# Patient Record
Sex: FEMALE | Race: WHITE | NOT HISPANIC OR LATINO | Employment: FULL TIME | URBAN - METROPOLITAN AREA
[De-identification: names, ages, dates, MRNs, and addresses within clinical notes are randomized per-mention and may not be internally consistent; named-entity substitution may affect disease eponyms.]

---

## 2017-01-20 ENCOUNTER — ALLSCRIPTS OFFICE VISIT (OUTPATIENT)
Dept: OTHER | Facility: OTHER | Age: 42
End: 2017-01-20

## 2017-04-19 ENCOUNTER — ALLSCRIPTS OFFICE VISIT (OUTPATIENT)
Dept: OTHER | Facility: OTHER | Age: 42
End: 2017-04-19

## 2017-04-19 DIAGNOSIS — Z13.220 ENCOUNTER FOR SCREENING FOR LIPOID DISORDERS: ICD-10-CM

## 2017-04-19 DIAGNOSIS — Z12.31 ENCOUNTER FOR SCREENING MAMMOGRAM FOR MALIGNANT NEOPLASM OF BREAST: ICD-10-CM

## 2017-04-19 DIAGNOSIS — R53.83 OTHER FATIGUE: ICD-10-CM

## 2017-04-19 DIAGNOSIS — Z13.6 ENCOUNTER FOR SCREENING FOR CARDIOVASCULAR DISORDERS: ICD-10-CM

## 2017-04-19 DIAGNOSIS — Z13.1 ENCOUNTER FOR SCREENING FOR DIABETES MELLITUS: ICD-10-CM

## 2017-05-17 ENCOUNTER — ALLSCRIPTS OFFICE VISIT (OUTPATIENT)
Dept: OTHER | Facility: OTHER | Age: 42
End: 2017-05-17

## 2017-05-23 ENCOUNTER — HOSPITAL ENCOUNTER (OUTPATIENT)
Dept: RADIOLOGY | Facility: HOSPITAL | Age: 42
Discharge: HOME | End: 2017-05-23
Payer: COMMERCIAL

## 2017-05-23 DIAGNOSIS — Z12.31 ENCOUNTER FOR SCREENING MAMMOGRAM FOR MALIGNANT NEOPLASM OF BREAST: ICD-10-CM

## 2017-05-23 PROCEDURE — G0202 SCR MAMMO BI INCL CAD: HCPCS

## 2017-05-30 ENCOUNTER — GENERIC CONVERSION - ENCOUNTER (OUTPATIENT)
Dept: OTHER | Facility: OTHER | Age: 42
End: 2017-05-30

## 2017-05-30 DIAGNOSIS — R92.8 OTHER ABNORMAL AND INCONCLUSIVE FINDINGS ON DIAGNOSTIC IMAGING OF BREAST: ICD-10-CM

## 2017-06-02 ENCOUNTER — HOSPITAL ENCOUNTER (OUTPATIENT)
Dept: RADIOLOGY | Facility: HOSPITAL | Age: 42
Discharge: HOME | End: 2017-06-02
Payer: COMMERCIAL

## 2017-06-02 DIAGNOSIS — R92.8 OTHER ABNORMAL AND INCONCLUSIVE FINDINGS ON DIAGNOSTIC IMAGING OF BREAST: ICD-10-CM

## 2017-06-02 PROCEDURE — G0206 DX MAMMO INCL CAD UNI: HCPCS

## 2017-06-02 PROCEDURE — 76642 ULTRASOUND BREAST LIMITED: CPT

## 2017-06-09 ENCOUNTER — GENERIC CONVERSION - ENCOUNTER (OUTPATIENT)
Dept: OTHER | Facility: OTHER | Age: 42
End: 2017-06-09

## 2017-07-17 ENCOUNTER — APPOINTMENT (OUTPATIENT)
Dept: LAB | Facility: HOSPITAL | Age: 42
End: 2017-07-17
Payer: COMMERCIAL

## 2017-07-17 ENCOUNTER — TRANSCRIBE ORDERS (OUTPATIENT)
Dept: ADMINISTRATIVE | Facility: HOSPITAL | Age: 42
End: 2017-07-17

## 2017-07-17 DIAGNOSIS — Z13.1 SCREENING FOR DIABETES MELLITUS: ICD-10-CM

## 2017-07-17 DIAGNOSIS — Z13.220 SCREENING FOR LIPOID DISORDERS: ICD-10-CM

## 2017-07-17 DIAGNOSIS — Z00.8 HEALTH EXAMINATION IN POPULATION SURVEY: Primary | ICD-10-CM

## 2017-07-17 DIAGNOSIS — R53.83 FATIGUE, UNSPECIFIED TYPE: Primary | ICD-10-CM

## 2017-07-17 DIAGNOSIS — Z00.8 HEALTH EXAMINATION IN POPULATION SURVEY: ICD-10-CM

## 2017-07-17 DIAGNOSIS — R53.83 FATIGUE, UNSPECIFIED TYPE: ICD-10-CM

## 2017-07-17 DIAGNOSIS — Z13.6 SCREENING FOR ISCHEMIC HEART DISEASE: ICD-10-CM

## 2017-07-17 LAB
ALBUMIN SERPL BCP-MCNC: 3.7 G/DL (ref 3.5–5)
ALP SERPL-CCNC: 84 U/L (ref 46–116)
ALT SERPL W P-5'-P-CCNC: 24 U/L (ref 12–78)
ANION GAP SERPL CALCULATED.3IONS-SCNC: 10 MMOL/L (ref 4–13)
AST SERPL W P-5'-P-CCNC: 15 U/L (ref 5–45)
BASOPHILS # BLD AUTO: 0 THOUSANDS/ΜL (ref 0–0.1)
BASOPHILS NFR BLD AUTO: 0 % (ref 0–1)
BILIRUB SERPL-MCNC: 0.3 MG/DL (ref 0.2–1)
BUN SERPL-MCNC: 14 MG/DL (ref 5–25)
CALCIUM SERPL-MCNC: 9 MG/DL (ref 8.3–10.1)
CHLORIDE SERPL-SCNC: 105 MMOL/L (ref 100–108)
CHOLEST SERPL-MCNC: 234 MG/DL (ref 50–200)
CO2 SERPL-SCNC: 24 MMOL/L (ref 21–32)
CREAT SERPL-MCNC: 0.89 MG/DL (ref 0.6–1.3)
EOSINOPHIL # BLD AUTO: 0.2 THOUSAND/ΜL (ref 0–0.61)
EOSINOPHIL NFR BLD AUTO: 2 % (ref 0–6)
ERYTHROCYTE [DISTWIDTH] IN BLOOD BY AUTOMATED COUNT: 13.3 % (ref 11.6–15.1)
EST. AVERAGE GLUCOSE BLD GHB EST-MCNC: 114 MG/DL
GFR SERPL CREATININE-BSD FRML MDRD: >60 ML/MIN/1.73SQ M
GLUCOSE P FAST SERPL-MCNC: 104 MG/DL (ref 65–99)
HBA1C MFR BLD: 5.6 % (ref 4.2–6.3)
HCT VFR BLD AUTO: 41.8 % (ref 37–47)
HDLC SERPL-MCNC: 53 MG/DL (ref 40–60)
HGB BLD-MCNC: 14 G/DL (ref 12–16)
LDLC SERPL CALC-MCNC: 150 MG/DL (ref 0–100)
LYMPHOCYTES # BLD AUTO: 2.9 THOUSANDS/ΜL (ref 0.6–4.47)
LYMPHOCYTES NFR BLD AUTO: 28 % (ref 14–44)
MCH RBC QN AUTO: 31 PG (ref 27–31)
MCHC RBC AUTO-ENTMCNC: 33.6 G/DL (ref 31.4–37.4)
MCV RBC AUTO: 92 FL (ref 82–98)
MONOCYTES # BLD AUTO: 0.7 THOUSAND/ΜL (ref 0.17–1.22)
MONOCYTES NFR BLD AUTO: 6 % (ref 4–12)
NEUTROPHILS # BLD AUTO: 6.8 THOUSANDS/ΜL (ref 1.85–7.62)
NEUTS SEG NFR BLD AUTO: 64 % (ref 43–75)
NRBC BLD AUTO-RTO: 0 /100 WBCS
PLATELET # BLD AUTO: 341 THOUSANDS/UL (ref 130–400)
PMV BLD AUTO: 8.8 FL (ref 8.9–12.7)
POTASSIUM SERPL-SCNC: 4.2 MMOL/L (ref 3.5–5.3)
PROT SERPL-MCNC: 7.1 G/DL (ref 6.4–8.2)
RBC # BLD AUTO: 4.53 MILLION/UL (ref 4.2–5.4)
SODIUM SERPL-SCNC: 139 MMOL/L (ref 136–145)
TRIGL SERPL-MCNC: 156 MG/DL
WBC # BLD AUTO: 10.6 THOUSAND/UL (ref 4.8–10.8)

## 2017-07-17 PROCEDURE — 85025 COMPLETE CBC W/AUTO DIFF WBC: CPT

## 2017-07-17 PROCEDURE — 36415 COLL VENOUS BLD VENIPUNCTURE: CPT

## 2017-07-17 PROCEDURE — 80061 LIPID PANEL: CPT

## 2017-07-17 PROCEDURE — 83036 HEMOGLOBIN GLYCOSYLATED A1C: CPT

## 2017-07-17 PROCEDURE — 80053 COMPREHEN METABOLIC PANEL: CPT

## 2017-07-18 ENCOUNTER — GENERIC CONVERSION - ENCOUNTER (OUTPATIENT)
Dept: OTHER | Facility: OTHER | Age: 42
End: 2017-07-18

## 2017-08-25 ENCOUNTER — ALLSCRIPTS OFFICE VISIT (OUTPATIENT)
Dept: OTHER | Facility: OTHER | Age: 42
End: 2017-08-25

## 2017-12-04 DIAGNOSIS — R92.8 OTHER ABNORMAL AND INCONCLUSIVE FINDINGS ON DIAGNOSTIC IMAGING OF BREAST: ICD-10-CM

## 2018-01-11 NOTE — RESULT NOTES
Discussion/Summary   Michelle,   Your fasting sugar is a little elevated, please watch your sugar and carbohydrate intake  Kidney function, liver function and blood count are normal    Dr Katerine Forbes      Verified Results  (1) CBC/PLT/DIFF [id number] 07:15AM Virginia Morejon     Test Name Result Flag Reference   WBC COUNT 10 60 Thousand/uL  4 80-10 80   RBC COUNT 4 53 Million/uL  4 20-5 40   HEMOGLOBIN 14 0 g/dL  12 0-16 0   HEMATOCRIT 41 8 %  37 0-47 0   MCV 92 fL  82-98   MCH 31 0 pg  27 0-31 0   MCHC 33 6 g/dL  31 4-37 4   RDW 13 3 %  11 6-15 1   MPV 8 8 fL L 8 9-12 7   PLATELET COUNT 739 Thousands/uL  130-400   nRBC AUTOMATED 0 /100 WBCs     NEUTROPHILS RELATIVE PERCENT 64 %  43-75   LYMPHOCYTES RELATIVE PERCENT 28 %  14-44   MONOCYTES RELATIVE PERCENT 6 %  4-12   EOSINOPHILS RELATIVE PERCENT 2 %  0-6   BASOPHILS RELATIVE PERCENT 0 %  0-1   NEUTROPHILS ABSOLUTE COUNT 6 80 Thousands/? ??L  1 85-7 62   LYMPHOCYTES ABSOLUTE COUNT 2 90 Thousands/? ??L  0 60-4 47   MONOCYTES ABSOLUTE COUNT 0 70 Thousand/? ??L  0 17-1 22   EOSINOPHILS ABSOLUTE COUNT 0 20 Thousand/? ??L  0 00-0 61   BASOPHILS ABSOLUTE COUNT 0 00 Thousands/? ??L  0 00-0 10   This bloodwork is non-fasting  Please drink two glasses of water morning of  bloodwork       (1) COMPREHENSIVE METABOLIC PANEL [id number] 29:41YZ Virginia Morejon     Test Name Result Flag Reference   SODIUM 139 mmol/L  136-145   POTASSIUM 4 2 mmol/L  3 5-5 3   CHLORIDE 105 mmol/L  100-108   CARBON DIOXIDE 24 mmol/L  21-32   ANION GAP (CALC) 10 mmol/L  4-13   BLOOD UREA NITROGEN 14 mg/dL  5-25   CREATININE 0 89 mg/dL  0 60-1 30   Standardized to IDMS reference method   CALCIUM 9 0 mg/dL  8 3-10 1   BILI, TOTAL 0 30 mg/dL  0 20-1 00   ALK PHOSPHATAS 84 U/L     ALT (SGPT) 24 U/L  12-78   AST(SGOT) 15 U/L  5-45   ALBUMIN 3 7 g/dL  3 5-5 0   TOTAL PROTEIN 7 1 g/dL  6 4-8 2   eGFR Non-African American      >60 0 ml/min/1 73sq    [employer] Education Program recommendations are as follows:  GFR calculation is accurate only with a steady state creatinine  Chronic Kidney disease less than 60 ml/min/1 73 sq  meters  Kidney failure less than 15 ml/min/1 73 sq  meters     GLUCOSE FASTING 104 mg/dL H 65-99

## 2018-01-11 NOTE — PROGRESS NOTES
Assessment    1  BMI 40 0-44 9, adult (V85 41) (Z68 41)   2  Encounter for preventive health examination (V70 0) (Z00 00)   3  Never smoker   4  Visit for screening mammogram (V76 12) (Z12 31)   5  Fatigue (780 79) (R53 83)    Plan  Anxiety    · Escitalopram Oxalate 20 MG Oral Tablet; Take 1 tablet daily  BMI 40 0-44 9, adult    · Begin a limited exercise program ; Status:Complete;   Done: 96KVO6851  Fatigue, Screening for cardiovascular condition, Screening for diabetes mellitus,  Screening for hyperlipidemia    · (1) CBC/PLT/DIFF; Status:Active; Requested for:19Apr2017;    · (1) COMPREHENSIVE METABOLIC PANEL; Status:Active; Requested for:19Apr2017;    · (1) HEMOGLOBIN A1C; Status:Active; Requested for:19Apr2017;    · (1) LIPID PANEL FASTING W DIRECT LDL REFLEX; Status:Active; Requested  for:19Apr2017;   Visit for screening mammogram    · * MAMMO SCREENING BILATERAL W CAD; Status:Active; Requested for:19Apr2017;     Discussion/Summary  health maintenance visit Currently, she eats an adequate diet and has an adequate exercise regimen  cervical cancer screening is current Breast cancer screening: mammogram has been ordered  Colorectal cancer screening: colorectal cancer screening is not indicated  Osteoporosis screening: bone mineral density testing is not indicated  The immunizations are up to date  Advice and education were given regarding weight loss  Chief Complaint  pt present for CPE  ac/cma      History of Present Illness  HM, Adult Female: The patient is being seen for a health maintenance evaluation  General Health: The patient's health since the last visit is described as good  Lifestyle:  She does not have a healthy diet  She has weight concerns  She exercises regularly  She does not use tobacco  she has just bought a bike  Screening:   HPI: her lexapro is working well  she is coping better  Review of Systems    Constitutional: feeling tired     Eyes: No complaints of eye pain, no red eyes, no eyesight problems, no discharge, no dry eyes, no itching of eyes  ENT: no complaints of earache, no loss of hearing, no nose bleeds, no nasal discharge, no sore throat, no hoarseness  Cardiovascular: No complaints of slow heart rate, no fast heart rate, no chest pain, no palpitations, no leg claudication, no lower extremity edema  Respiratory: No complaints of shortness of breath, no wheezing, no cough, no SOB on exertion, no orthopnea, no PND  Gastrointestinal: No complaints of abdominal pain, no constipation, no nausea or vomiting, no diarrhea, no bloody stools  Neurological: headache  Active Problems    1  Anxiety (300 00) (F41 9)   2  BMI 40 0-44 9, adult (V85 41) (Z68 41)   3  Encounter for gynecological examination without abnormal finding (V72 31) (Z01 419)   4  Long term use of drug (V58 69) (Z79 899)   5  Never smoker   6  Obesity (278 00) (E66 9)   7  Polycystic ovarian syndrome (256 4) (E28 2)   8  Screening for cardiovascular condition (V81 2) (Z13 6)   9  Screening for deficiency anemia (V78 1) (Z13 0)   10  Screening for diabetes mellitus (V77 1) (Z13 1)   11   Screening for hyperlipidemia (V77 91) (Z13 220)    Past Medical History    · History of Acute upper respiratory infection (465 9) (J06 9)   · History of Dysuria (788 1) (R30 0)   · History of Encounter for screening for cardiovascular disorders (V81 2) (Z13 6)   · History of gastroenteritis (V12 79) (Z87 19)   · History of hematuria (V13 09) (Z87 448)   · Influenza vaccine needed (V04 81) (Z23)   · History of Injury of left lower leg (959 7) (K41 53GU)   · History of Other fatigue (780 79) (R53 83)   · History of Screening for depression (V79 0) (Z13 89)   · History of Screening for diabetes mellitus (DM) (V77 1) (Z13 1)   · History of Skin lesion (709 9) (L98 9)   · 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)    Surgical History    · History of Cervical Cerclage During Pregnancy   · History of Cholecystectomy   · History of In Vitro Fertilization Embryo Transfer   · History of Obstetrical Surgery    Family History  Mother    · Family history of Blind  Father    · Family history of Stroke  Sister    · Family history of Asthma  Grandmother    · Family history of Melanoma  Maternal Grandmother    · Family history of Melanoma    Social History    · Never smoker   · No alcohol use   · No drug use    Current Meds   1  ALPRAZolam 0 25 MG Oral Tablet; take 1 tablet daily prn; Therapy: 24KZG0393 to (Evaluate:53Unx6108); Last Rx:21Nov2016 Ordered   2  Escitalopram Oxalate 20 MG Oral Tablet; Take 1 tablet daily; Therapy: 06VWW6996 to (Last Rx:15Mar2017)  Requested for: 46MVL9393 Ordered   3  Mirena IUD; USE AS DIRECTED Recorded    Allergies    1  No Known Drug Allergies    Vitals   Recorded: 19Apr2017 05:58PM   Temperature 99 F   Heart Rate 72   Respiration 16   Systolic 571   Diastolic 80   Height 5 ft 2 in   Weight 224 lb    BMI Calculated 40 97   BSA Calculated 2 01     Physical Exam    Constitutional   General appearance: No acute distress, well appearing and well nourished  Eyes   Conjunctiva and lids: No swelling, erythema or discharge  Ears, Nose, Mouth, and Throat   External inspection of ears and nose: Normal     Otoscopic examination: Tympanic membranes translucent with normal light reflex  Canals patent without erythema  Oropharynx: Normal with no erythema, edema, exudate or lesions  Neck   Neck: Supple, symmetric, trachea midline, no masses  Pulmonary   Auscultation of lungs: Clear to auscultation  Cardiovascular   Auscultation of heart: Normal rate and rhythm, normal S1 and S2, no murmurs  Abdomen   Abdomen: Non-tender, no masses  Musculoskeletal   Gait and station: Normal     Neurologic   Reflexes: 2+ and symmetric  Coordination: Normal finger to nose and heel to shin      Psychiatric   Mood and affect: Normal        Results/Data  PHQ-2 Adult Depression Screening [id number] 06: 05PM Becky Salgado     Test Name Result Flag Reference   PHQ-2 Adult Depression Score 0     Over the last two weeks, how often have you been bothered by any of the following problems? Little interest or pleasure in doing things: Not at all - 0  Feeling down, depressed, or hopeless: Not at all - 0   PHQ-2 Adult Depression Screening Negative       (1) THIN PREP PAP WITH IMAGING 12IGA3899 09:36AM Deisy Moctezuma   Other High Risk HPV Negative, HPV 16 Negative, HPV 18 Negative  HPV types: 16,18,31,33,35,39,45,51,52,56,58,59,66 and 68 DNA are undetectable or below the pre-set threshold  Roche's FDA approved Nemesio 4800 is utilized with strict adherence to the 's instruction  manual to test for the presence of High-Risk HPV DNA, as well as HPV 16 and HPV 18  This instrument  has been validated by our laboratory and/or by the   A negative result does not preclude the presence of HPV infection because results depend on adequate  specimen collection, absence of inhibitors and sufficient DNA to be detected  Additionally, HPV negative  results are not intended to prevent women from proceeding to colposcopy if clinically warranted  Positive HPV test results indicate the presence of any one or more of the high risk types, but since patients  are often co-infected with low-risk types it does not rule out the presence of low-risk types in patients  with mixed infections  Test Name Result Flag Reference   HPV, HIGH-RISK   HPV NEG, HPV16 NEG, HPV18 NEG   HPV NEG, HPV16 NEG, HPV18 NEG       Procedure    Procedure: Visual Acuity Test    Indication: routine screening  Inforrmation supplied by a Snellen chart     Results: 20/15 in both eyes with corrective device, 20/40 in the right eye with corrective device, 20/20 in the left eye with corrective device      Future Appointments    Date/Time Provider Specialty Site   10/23/2017 06:00 PM Zeinab Alba, [facility] [place] [address] Signatures   Electronically signed by : Artem Rebollar DO;  Apr 19 2017  7:07PM EST                       (Author)

## 2018-01-12 VITALS
DIASTOLIC BLOOD PRESSURE: 80 MMHG | SYSTOLIC BLOOD PRESSURE: 126 MMHG | TEMPERATURE: 99 F | BODY MASS INDEX: 41.22 KG/M2 | HEART RATE: 72 BPM | WEIGHT: 224 LBS | HEIGHT: 62 IN | RESPIRATION RATE: 16 BRPM

## 2018-01-12 VITALS
DIASTOLIC BLOOD PRESSURE: 80 MMHG | HEART RATE: 76 BPM | HEIGHT: 62 IN | WEIGHT: 224 LBS | RESPIRATION RATE: 16 BRPM | TEMPERATURE: 98.3 F | BODY MASS INDEX: 41.22 KG/M2 | SYSTOLIC BLOOD PRESSURE: 134 MMHG

## 2018-01-13 VITALS
HEART RATE: 68 BPM | BODY MASS INDEX: 41.41 KG/M2 | WEIGHT: 225 LBS | HEIGHT: 62 IN | RESPIRATION RATE: 16 BRPM | TEMPERATURE: 98.6 F | SYSTOLIC BLOOD PRESSURE: 122 MMHG | DIASTOLIC BLOOD PRESSURE: 80 MMHG

## 2018-01-13 NOTE — MISCELLANEOUS
Provider Comments  Provider Comments:   Call regarding no show on 9/28/2016, rescheduled appt for 10/5/2016      Signatures   Electronically signed by : Flori Morin DO; Sep 28 2016  7:26PM EST                       (Review)

## 2018-01-13 NOTE — RESULT NOTES
Verified Results  (1) LIPID PANEL FASTING W DIRECT LDL REFLEX 20Jun2016 07:24AM Arabella Alex     Test Name Result Flag Reference   CHOLESTEROL 256 mg/dL H    LDL CHOLESTEROL CALCULATED 156 mg/dL H 0-100   Triglyceride:         Normal              <150 mg/dl       Borderline High    150-199 mg/dl       High               200-499 mg/dl       Very High          >499 mg/dl  Cholesterol:         Desirable        <200 mg/dl      Borderline High  200-239 mg/dl      High             >239 mg/dl  HDL Cholesterol:        High    >59 mg/dL      Low     <41 mg/dL  LDL Cholesterol:        Optimal          <100 mg/dl        Near Optimal     100-129 mg/dl        Above Optimal          Borderline High   130-159 mg/dl          High              160-189 mg/dl          Very High        >189 mg/dl  LDL CALCULATED:    This screening LDL is a calculated result  It does not have the accuracy of the Direct Measured LDL in the monitoring of patients with hyperlipidemia and/or statin therapy  Direct Measure LDL (RTW199) must be ordered separately in these patients  TRIGLYCERIDES 170 mg/dL H <=150   Specimen collection should occur prior to N-Acetylcysteine or Metamizole administration due to the potential for falsely depressed results  HDL,DIRECT 66 mg/dL H 40-60   Specimen collection should occur prior to Metamizole administration due to the potential for falsely depressed results  (1) COMPREHENSIVE METABOLIC PANEL [id number] 50:98HG Arabella Alex     Test Name Result Flag Reference   GLUCOSE,RANDM 98 mg/dL     If the patient is fasting, the ADA then defines impaired fasting glucose as > 100 mg/dL and diabetes as > or equal to 123 mg/dL     SODIUM 137 mmol/L  136-145   POTASSIUM 4 7 mmol/L  3 5-5 3   CHLORIDE 104 mmol/L  100-108   CARBON DIOXIDE 23 mmol/L  21-32   ANION GAP (CALC) 10 mmol/L  4-13   BLOOD UREA NITROGEN 17 mg/dL  5-25   CREATININE 0 80 mg/dL  0 60-1 30   Standardized to IDMS reference method   CALCIUM 9 0 mg/dL  8 3-10 1   BILI, TOTAL 0 40 mg/dL  0 20-1 00   ALK PHOSPHATAS 84 U/L     ALT (SGPT) 26 U/L  12-78   AST(SGOT) 12 U/L  5-45   ALBUMIN 4 0 g/dL  3 5-5 0   TOTAL PROTEIN 7 6 g/dL  6 4-8 2   eGFR Non-African American      >60 0 ml/min/1 73sq m   [facility] [employer] Disease Education Program recommendations are as follows:  GFR calculation is accurate only with a steady state creatinine  Chronic Kidney disease less than 60 ml/min/1 73 sq  meters  Kidney failure less than 15 ml/min/1 73 sq  meters  (1) HEMOGLOBIN A1C 20Jun2016 07:24AM Roxanna      Test Name Result Flag Reference   HEMOGLOBIN A1C 5 6 %  4 2-6 3   EST  AVG  GLUCOSE 114 mg/dl         Discussion/Summary   Michelle,   Your vitamin D level is significantly low  Please start Vitamin D3 4000 units a day  Also, your cholesterol has increased, please watch your sugar intake     Dr May Morataya

## 2018-01-13 NOTE — RESULT NOTES
Discussion/Summary   Victor Manuel Donamos,   You need a follow up ultrasound in 6 months  Dr Gilbert Avila (DIAGNOSTIC) [id number] 02:23PM Sergio Kenyon Order Number: [id number]    - Patient Instructions: To schedule this appointment, please contact Central Scheduling at [phone]  Test Name Result Flag Reference   US BREAST RIGHT LIMITED (Report)     Patient History:   Patient had first child at age 29  Family history of breast cancer at age 48 or over in maternal    grandmother  Taking hormonal contraceptives for 8 years  Patient's BMI is 41 0      Reason for exam: addl evaluation requested from abnormal    screening  Asymmetric density described on screening mammography  Mammo Diagnostic Right W CAD: June 2, 2017 - Check In #: [de-identified]   Spot compression CC, spot compression MLO, and ML view(s) were    taken of the right breast      Technologist: ELIAS Small   Prior study comparison: May 23, 2017, mammo screening bilateral W   CAD performed at [facility]  There are scattered fibroglandular densities  Compression and    lateral mammography of the retroareolar right breast confirms a    multilobular nodule between the 9:00 to 10:00 locations of the    breast 4 5 cm posterior to the nipple  Targeted sonography    performed same date demonstrates a cluster of cysts at the 9:00    location of the breast, 5 cm from the nipple, measuring 1 1 x 0 7   x 1 0 cm  The cystic mass is avascular and evokes acoustic    enhancement posteriorly  No suspicious solid mass lesions, areas   of architectural distortion, or abnormal acoustic shadowing seen   to suggest malignancy  The cluster is adjacent to a duct  Short-term follow-up recommended  US Breast Right Limited: June 2, 2017 - Check In #: [de-identified]   Standard views       Technologist: DILEEP Moore RDMS RVT RDCS   A uniformly echogenic layer of fibroglandular tissue  Targeted    sonography at the 9:00 location the right breast confirms a multi   compartmental cystic mass measuring 1 1 x 0 7 x 1 0 cm, likely a   cluster of benign cysts  This does correspond to the    mammographic finding evoking acoustic enhancement posteriorly    with no solid component or vascularity  Short-term follow-up is    recommended  No suspicious mass lesions, areas of architectural    distortion, or abnormal acoustic shadowing to suggest malignancy  Benign-appearing cluster of cysts at the 1:00 location   of the right breast      ACR BI-RADSï¾® Assessments: BiRad:3 - Probably Benign (Overall)   Right breast Right Diag Mamm: BiRad:3 - probably benign finding    in the right breast    Right breast Right Brst US: BiRad:3 - probably benign finding in    the right breast      Recommendation:   Ultrasound of the right breast in 6 months  Analyzed by CAD     Transcription Location: [facility] 98: [id number]     Risk Value(s):   Tyrer-Cuzick 10 Year: 2 300%, Tyrer-Cuzick Lifetime: 18 100%,    Myriad Table: 1 5%, GABRIEL 5 Year: 0 8%, NCI Lifetime: 13 6%   Signed by:   Edu Zarate MD   6/2/17     MAMMO DIAGNOSTIC RIGHT W CAD [id number] 02:21PM Sergio Keithing Order Number: [id number]    - Patient Instructions: To schedule this appointment, please contact Central Scheduling at [phone]  Do not wear any perfume, powder, lotion or deodorant on breast or underarm area  Please bring your doctors order, referral (if needed) and insurance information with you on the day of the test  Failure to bring this information may result in this test being rescheduled  Arrive 15 minutes prior to your appointment time to register  On the day of your test, please bring any prior mammogram or breast studies with you that were not performed at a [facility]  Failure to bring prior exams may result in your test needing to be rescheduled       Test Name Result Flag Reference   [facility] DIAGNOSTIC RIGHT W CAD (Report)     Patient History:   Patient had first child at age 29  Family history of breast cancer at age 48 or over in maternal    grandmother  Taking hormonal contraceptives for 8 years  Patient's BMI is 41 0      Reason for exam: addl evaluation requested from abnormal    screening  Asymmetric density described on screening mammography  Mammo Diagnostic Right W CAD: June 2, 2017 - Check In #: [de-identified]   Spot compression CC, spot compression MLO, and ML view(s) were    taken of the right breast      Technologist: ELIAS Multani   Prior study comparison: May 23, 2017, mammo screening bilateral W   CAD performed at [place] [address]  There are scattered fibroglandular densities  Compression and    lateral mammography of the retroareolar right breast confirms a    multilobular nodule between the 9:00 to 10:00 locations of the    breast 4 5 cm posterior to the nipple  Targeted sonography    performed same date demonstrates a cluster of cysts at the 9:00    location of the breast, 5 cm from the nipple, measuring 1 1 x 0 7   x 1 0 cm  The cystic mass is avascular and evokes acoustic    enhancement posteriorly  No suspicious solid mass lesions, areas   of architectural distortion, or abnormal acoustic shadowing seen   to suggest malignancy  The cluster is adjacent to a duct  Short-term follow-up recommended  US Breast Right Limited: June 2, 2017 - Check In #: [de-identified]   Standard views  Technologist: DILEEP Saldana RDMS RVT RDCS   A uniformly echogenic layer of fibroglandular tissue  Targeted    sonography at the 9:00 location the right breast confirms a multi   compartmental cystic mass measuring 1 1 x 0 7 x 1 0 cm, likely a   cluster of benign cysts  This does correspond to the    mammographic finding evoking acoustic enhancement posteriorly    with no solid component or vascularity  Short-term follow-up is    recommended   No suspicious mass lesions, areas of architectural    distortion, or abnormal acoustic shadowing to suggest malignancy  Benign-appearing cluster of cysts at the 1:00 location   of the right breast      ACR BI-RADSï¾® Assessments: BiRad:3 - Probably Benign (Overall)   Right breast Right Diag Mamm: BiRad:3 - probably benign finding    in the right breast    Right breast Right Brst US: BiRad:3 - probably benign finding in    the right breast      Recommendation:   Ultrasound of the right breast in 6 months     Analyzed by CAD     Transcription Location: [facility] 98: [id number]     Risk Value(s):   Tyrer-Cuzick 10 Year: 2 300%, Tyrer-Cuzick Lifetime: 18 100%,    Myriad Table: 1 5%, GABRIEL 5 Year: 0 8%, NCI Lifetime: 13 6%   Signed by:   Cristiano Calderon MD   6/2/17

## 2018-01-15 VITALS
HEART RATE: 72 BPM | WEIGHT: 221 LBS | RESPIRATION RATE: 16 BRPM | SYSTOLIC BLOOD PRESSURE: 118 MMHG | TEMPERATURE: 99.1 F | DIASTOLIC BLOOD PRESSURE: 74 MMHG | BODY MASS INDEX: 40.67 KG/M2 | HEIGHT: 62 IN

## 2018-01-16 NOTE — RESULT NOTES
Verified Results  (1) URINE CULTURE [id number] 03:00PM Trina Lamar     Test Name Result Flag Reference   CLINICAL REPORT (Report)     Test:        Urine culture  Specimen Type:   Urine  Specimen Date:   3/23/2016 3:00 PM  Result Date:    3/26/2016 10:13 AM  Result Status:   Final result  Resulting Lab:   [facility] [address] [place] [address]            Tel: [phone]                 CULTURE                                       ------------------                                   >100,000 cfu/ml Mixed Contaminants X5       Discussion/Summary   Michelle,   Your urine culture just showed contamination - which is very common for women  If you are still having any symptoms please call the office     Dr Fabian Davila

## 2018-01-17 NOTE — RESULT NOTES
Verified Results  * MAMMO SCREENING BILATERAL W CAD [id number] 05:23PM Milagro Bauman Order Number: [id number]    - Patient Instructions: To schedule this appointment, please contact Central Scheduling at [phone]  Do not wear any perfume, powder, lotion or deodorant on breast or underarm area  Please bring your doctors order, referral (if needed) and insurance information with you on the day of the test  Failure to bring this information may result in this test being rescheduled  Arrive 15 minutes prior to your appointment time to register  On the day of your test, please bring any prior mammogram or breast studies with you that were not performed at a [facility]  Failure to bring prior exams may result in your test needing to be rescheduled  Test Name Result Flag Reference   MAMMO SCREENING BILATERAL W CAD (Report)     Patient History:   Patient had first child at age 29  Family history of breast cancer at age 48 or over in maternal    grandmother  Taking hormonal contraceptives for 8 years  Patient's BMI is 41 0      Reason for exam: screening, asymptomatic  Screening     Mammo Screening Bilateral W CAD: May 23, 2017 - Check In #:    [de-identified]   Bilateral CC and MLO view(s) were taken  Technologist: Lizzette Shipley, RTRM   No prior studies available for comparison  There are scattered fibroglandular densities  There is a 1 3 cm    nodular asymmetry in the outer right breast at approximately the    9 o'clock position, 7 cm from the nipple  The patient should    return for lateral and spot compression views, as well as    ultrasound, for more definitive evaluation  No dominant soft tissue mass, architectural distortion or    suspicious calcifications are noted in either breast  The skin    and nipple contours are within normal limits  1  Upper outer right breast 1 3 cm asymmetry  Further    evaluation recommended     2  Unremarkable left mammogram      ACR BI-RADSï¾® Assessments: BiRad:0 - Incomplete: needs additional    imaging evaluation - Right     Recommendation:   Further imaging of the right breast    A breast health care nurse from our facility will be contacting    the patient regarding the need for additional imaging  Analyzed by CAD     8-10% of cancers will be missed on mammography  Management of a    palpable abnormality must be based on clinical grounds  Patients   will be notified of their results via letter from our facility  Accredited by [employer] of [employer] and FDA  Transcription Location: [facility] 98: [id number]     Risk Value(s):   Tyrer-Cuzick 10 Year: 2 300%, Tyrer-Cuzick Lifetime: 18 100%,    Myriad Table: 1 5%, GABRIEL 5 Year: 0 8%, NCI Lifetime: 13 6%   Signed by:   Radha Galdamez MD   5/23/17       Plan  Abnormal mammogram of right breast    · *US BREAST RIGHT LIMITED (DIAGNOSTIC);  Status:Hold For - Scheduling; Requested  for:59Nhh1613;    · MAMMO DIAGNOSTIC RIGHT W CAD; Status:Hold For - Scheduling; Requested  for:[id number];

## 2018-05-10 ENCOUNTER — OFFICE VISIT (OUTPATIENT)
Dept: FAMILY MEDICINE CLINIC | Facility: CLINIC | Age: 43
End: 2018-05-10
Payer: COMMERCIAL

## 2018-05-10 VITALS
DIASTOLIC BLOOD PRESSURE: 70 MMHG | WEIGHT: 225.4 LBS | TEMPERATURE: 98.6 F | SYSTOLIC BLOOD PRESSURE: 126 MMHG | BODY MASS INDEX: 41.23 KG/M2 | RESPIRATION RATE: 18 BRPM | HEART RATE: 78 BPM

## 2018-05-10 DIAGNOSIS — J01.90 ACUTE NON-RECURRENT SINUSITIS, UNSPECIFIED LOCATION: Primary | ICD-10-CM

## 2018-05-10 PROCEDURE — 99213 OFFICE O/P EST LOW 20 MIN: CPT | Performed by: INTERNAL MEDICINE

## 2018-05-10 RX ORDER — ALPRAZOLAM 0.25 MG/1
1 TABLET ORAL DAILY PRN
COMMUNITY
Start: 2016-11-21 | End: 2019-01-14 | Stop reason: ALTCHOICE

## 2018-05-10 RX ORDER — ESCITALOPRAM OXALATE 20 MG/1
1 TABLET ORAL DAILY
COMMUNITY
Start: 2015-02-19 | End: 2021-02-08 | Stop reason: SDUPTHER

## 2018-05-10 RX ORDER — AMOXICILLIN 875 MG/1
875 TABLET, COATED ORAL 2 TIMES DAILY
Qty: 20 TABLET | Refills: 0 | Status: SHIPPED | OUTPATIENT
Start: 2018-05-10 | End: 2018-05-21

## 2018-05-10 RX ORDER — AMOXICILLIN 875 MG/1
875 TABLET, COATED ORAL 2 TIMES DAILY
Qty: 20 TABLET | Refills: 0 | Status: SHIPPED | OUTPATIENT
Start: 2018-05-10 | End: 2018-05-10 | Stop reason: SDUPTHER

## 2018-05-10 NOTE — PROGRESS NOTES
Subjective:      Patient ID: Megan Sierra is a 40 y o  female  Chief Complaint   Patient presents with    Cough     5/3/18  af/rma     Cold Like Symptoms    Sore Throat       Started one week ago with severe sore throat, then congestion, and now cough  Has had low grade fever (99 3)  Cough is productive but cannot bring up  No dyspnea or wheeze  Taking otc robitussin and mucinex without much relief  The following portions of the patient's history were reviewed and updated as appropriate: allergies, current medications, past family history, past medical history, past social history, past surgical history and problem list     Review of Systems   Constitutional: Positive for fatigue and fever  HENT: Positive for congestion, sinus pressure and sore throat  Respiratory: Positive for cough  Cardiovascular: Negative  Current Outpatient Prescriptions   Medication Sig Dispense Refill    ALPRAZolam (XANAX) 0 25 mg tablet Take 1 tablet by mouth daily as needed      escitalopram (LEXAPRO) 20 mg tablet Take 1 tablet by mouth daily      levonorgestrel (MIRENA, 52 MG,) 20 MCG/24HR IUD by Intrauterine route Twice daily      amoxicillin (AMOXIL) 875 mg tablet Take 1 tablet (875 mg total) by mouth 2 (two) times a day for 10 days 20 tablet 0     No current facility-administered medications for this visit  Objective:    /70   Pulse 78   Temp 98 6 °F (37 °C)   Resp 18   Wt 102 kg (225 lb 6 4 oz)   BMI 41 23 kg/m²        Physical Exam   Constitutional: She appears well-developed and well-nourished  HENT:   Right Ear: Tympanic membrane is retracted  Left Ear: Tympanic membrane is retracted  Nose: Mucosal edema present  Eyes: Conjunctivae are normal    Neck: Neck supple  No JVD present  No thyromegaly present  Cardiovascular: Normal rate, regular rhythm, normal heart sounds and intact distal pulses  Exam reveals no gallop and no friction rub      No murmur heard   Pulmonary/Chest: Effort normal and breath sounds normal  She has no wheezes  She has no rales  Abdominal: Soft  Bowel sounds are normal  She exhibits no distension  There is no tenderness  Musculoskeletal: She exhibits no edema  Assessment/Plan:    No problem-specific Assessment & Plan notes found for this encounter  Diagnoses and all orders for this visit:    Acute non-recurrent sinusitis, unspecified location  Comments:  Advised saline NS and mucinex DM  Follow up if not improving  Orders:  -     amoxicillin (AMOXIL) 875 mg tablet; Take 1 tablet (875 mg total) by mouth 2 (two) times a day for 10 days    Other orders  -     levonorgestrel (MIRENA, 52 MG,) 20 MCG/24HR IUD; by Intrauterine route Twice daily  -     ALPRAZolam (XANAX) 0 25 mg tablet; Take 1 tablet by mouth daily as needed  -     escitalopram (LEXAPRO) 20 mg tablet; Take 1 tablet by mouth daily          Return if symptoms worsen or fail to improve         Kristina David MD

## 2018-05-11 ENCOUNTER — TELEPHONE (OUTPATIENT)
Dept: FAMILY MEDICINE CLINIC | Facility: CLINIC | Age: 43
End: 2018-05-11

## 2018-05-11 NOTE — TELEPHONE ENCOUNTER
Paige Galeazzi was seen yesterday by Dr Parth Alanis and got a work note  She would like to know if she can get extended for today too  Dr Parth Alanis is not in , can another excuse her from work yesterday and today? Thank you

## 2018-05-11 NOTE — TELEPHONE ENCOUNTER
5/11/2018 10:41 AM new note for work written,  In clerical in basket  Please let her know it is ready  Galen Hernandez, DO

## 2018-05-11 NOTE — TELEPHONE ENCOUNTER
Please advise  Dr Briant Riedel not in until Monday and you are pts PCP  Thank you   Andrew Hamilton

## 2018-05-11 NOTE — LETTER
May 11, 2018     Patient: Simran Hanley   YOB: 1975   Date of Visit: 5/11/2018       To Whom it May Concern:    Simran Hanley was seen in my clinic on 5/10/18  Please excuse from work on 5/11/18  If you have any questions or concerns, please don't hesitate to call           Sincerely,          Carmen Watkins DO         CC: No Recipients

## 2018-05-21 ENCOUNTER — OFFICE VISIT (OUTPATIENT)
Dept: FAMILY MEDICINE CLINIC | Facility: CLINIC | Age: 43
End: 2018-05-21
Payer: COMMERCIAL

## 2018-05-21 ENCOUNTER — TELEPHONE (OUTPATIENT)
Dept: FAMILY MEDICINE CLINIC | Facility: CLINIC | Age: 43
End: 2018-05-21

## 2018-05-21 VITALS
BODY MASS INDEX: 41.77 KG/M2 | RESPIRATION RATE: 18 BRPM | OXYGEN SATURATION: 97 % | HEART RATE: 80 BPM | DIASTOLIC BLOOD PRESSURE: 76 MMHG | SYSTOLIC BLOOD PRESSURE: 122 MMHG | TEMPERATURE: 97.6 F | HEIGHT: 62 IN | WEIGHT: 227 LBS

## 2018-05-21 DIAGNOSIS — R05.9 COUGH: ICD-10-CM

## 2018-05-21 DIAGNOSIS — R05.9 COUGH: Primary | ICD-10-CM

## 2018-05-21 PROCEDURE — 99213 OFFICE O/P EST LOW 20 MIN: CPT | Performed by: NURSE PRACTITIONER

## 2018-05-21 RX ORDER — METHYLPREDNISOLONE 4 MG/1
TABLET ORAL
Qty: 1 EACH | Refills: 0 | Status: SHIPPED | OUTPATIENT
Start: 2018-05-21 | End: 2018-05-27

## 2018-05-21 RX ORDER — PROMETHAZINE HYDROCHLORIDE AND CODEINE PHOSPHATE 6.25; 1 MG/5ML; MG/5ML
5 SYRUP ORAL EVERY 4 HOURS PRN
Qty: 120 ML | Refills: 0 | Status: SHIPPED | OUTPATIENT
Start: 2018-05-21 | End: 2018-05-21 | Stop reason: SDUPTHER

## 2018-05-21 RX ORDER — PROMETHAZINE HYDROCHLORIDE AND CODEINE PHOSPHATE 6.25; 1 MG/5ML; MG/5ML
5 SYRUP ORAL EVERY 4 HOURS PRN
Qty: 120 ML | Refills: 0 | Status: SHIPPED | OUTPATIENT
Start: 2018-05-21 | End: 2018-06-13 | Stop reason: ALTCHOICE

## 2018-05-21 NOTE — PROGRESS NOTES
Assessment/Plan:    Advised on supportive care  Follow up as needed for persistent/worsening symptoms  Problem List Items Addressed This Visit     None      Visit Diagnoses     Cough    -  Primary    Relevant Medications    Methylprednisolone 4 MG TBPK    promethazine-codeine (PHENERGAN WITH CODEINE) 6 25-10 mg/5 mL syrup          There are no Patient Instructions on file for this visit  No Follow-up on file  Subjective:      Patient ID: Eli Tong is a 40 y o  female  Chief Complaint   Patient presents with    Cough     onset yesterday drhlpn       She was treated 10 days ago for a sinus infection  Completed a course of Amoxicillin  Sinus symptoms improved, however she has had a lingering cough  Yesterday, cough became severe  It is dry/hacking, and she can't catch her breathe  Denies chest congestion, fevers, or wheezing  Has tried Mucinex OTC which isn't helping  The following portions of the patient's history were reviewed and updated as appropriate: allergies, current medications, past family history, past medical history, past social history, past surgical history and problem list     Review of Systems   Constitutional: Positive for fatigue  Negative for chills and fever  HENT: Negative for congestion, ear pain, postnasal drip, rhinorrhea, sinus pressure and sore throat  Respiratory: Positive for cough and shortness of breath  Negative for wheezing  Cardiovascular: Negative for chest pain  Gastrointestinal: Negative for abdominal pain, diarrhea, nausea and vomiting  Musculoskeletal: Negative for arthralgias  Skin: Negative for rash  Neurological: Negative for headaches           Current Outpatient Prescriptions   Medication Sig Dispense Refill    ALPRAZolam (XANAX) 0 25 mg tablet Take 1 tablet by mouth daily as needed      escitalopram (LEXAPRO) 20 mg tablet Take 1 tablet by mouth daily      levonorgestrel (MIRENA, 52 MG,) 20 MCG/24HR IUD by Intrauterine route Twice daily      Methylprednisolone 4 MG TBPK Use as directed on package 1 each 0    promethazine-codeine (PHENERGAN WITH CODEINE) 6 25-10 mg/5 mL syrup Take 5 mL by mouth every 4 (four) hours as needed for cough 120 mL 0     No current facility-administered medications for this visit  Objective:    /76   Pulse 80   Temp 97 6 °F (36 4 °C)   Resp 18   Ht 5' 2" (1 575 m)   Wt 103 kg (227 lb)   BMI 41 52 kg/m²        Physical Exam   Constitutional: She appears well-developed and well-nourished  HENT:   Right Ear: Tympanic membrane, external ear and ear canal normal    Left Ear: Tympanic membrane, external ear and ear canal normal    Nose: No mucosal edema  Mouth/Throat: Oropharynx is clear and moist and mucous membranes are normal    Eyes: Conjunctivae are normal    Cardiovascular: Normal rate, regular rhythm and normal heart sounds  Pulmonary/Chest: Effort normal and breath sounds normal    Abdominal: Bowel sounds are normal  She exhibits no distension  There is no splenomegaly or hepatomegaly  There is no tenderness  Lymphadenopathy:        Right cervical: No superficial cervical adenopathy present  Left cervical: No superficial cervical adenopathy present  Skin: No rash noted  Psychiatric: She has a normal mood and affect  Nursing note and vitals reviewed               Marco Carrasco

## 2018-05-21 NOTE — LETTER
May 21, 2018     Patient: Glenn Ocampo   YOB: 1975   Date of Visit: 5/21/2018       To Whom it May Concern:    Glenn Ocampo is under my professional care  She was seen in my office on 5/21/2018  Please excuse from work 5/21/18 and 5/22/18    If you have any questions or concerns, please don't hesitate to call           Sincerely,          YESY Patel        CC: No Recipients

## 2018-05-21 NOTE — TELEPHONE ENCOUNTER
ADARSH   Patients Cough medication was sent to the wrong pharmacy  Please resend to [facility] aide in [place]

## 2018-06-13 ENCOUNTER — OFFICE VISIT (OUTPATIENT)
Dept: FAMILY MEDICINE CLINIC | Facility: CLINIC | Age: 43
End: 2018-06-13
Payer: COMMERCIAL

## 2018-06-13 VITALS
SYSTOLIC BLOOD PRESSURE: 120 MMHG | RESPIRATION RATE: 16 BRPM | DIASTOLIC BLOOD PRESSURE: 82 MMHG | TEMPERATURE: 97.6 F | HEIGHT: 62 IN | BODY MASS INDEX: 42.03 KG/M2 | HEART RATE: 68 BPM | WEIGHT: 228.4 LBS

## 2018-06-13 DIAGNOSIS — H66.92 LEFT OTITIS MEDIA, UNSPECIFIED OTITIS MEDIA TYPE: Primary | ICD-10-CM

## 2018-06-13 DIAGNOSIS — R05.3 PERSISTENT COUGH: ICD-10-CM

## 2018-06-13 PROCEDURE — 99213 OFFICE O/P EST LOW 20 MIN: CPT | Performed by: NURSE PRACTITIONER

## 2018-06-13 RX ORDER — AMOXICILLIN 875 MG/1
875 TABLET, COATED ORAL 2 TIMES DAILY
Qty: 20 TABLET | Refills: 0 | Status: SHIPPED | OUTPATIENT
Start: 2018-06-13 | End: 2018-06-23

## 2018-06-13 RX ORDER — IPRATROPIUM BROMIDE AND ALBUTEROL SULFATE 2.5; .5 MG/3ML; MG/3ML
3 SOLUTION RESPIRATORY (INHALATION) 3 TIMES DAILY
Qty: 90 ML | Refills: 0 | Status: SHIPPED | OUTPATIENT
Start: 2018-06-13 | End: 2018-06-23

## 2018-06-13 RX ORDER — GUAIFENESIN/DEXTROMETHORPHAN 100-10MG/5
5 SYRUP ORAL 3 TIMES DAILY PRN
Qty: 118 ML | Refills: 0 | Status: SHIPPED | OUTPATIENT
Start: 2018-06-13 | End: 2019-01-14

## 2018-06-13 NOTE — PROGRESS NOTES
Assessment/Plan:  Take medication with food  It is important that you take the entire course of antibiotics prescribed  May also take a probiotic of your choice to maintain healthy GI sami  Can take some probiotic and yogurt with the medication  Supportive care discussed and advised  Follow up for no improvement and worsening of conditions  Patient advised and educated when to see immediate medical care  Diagnoses and all orders for this visit:    Left otitis media, unspecified otitis media type  -     amoxicillin (AMOXIL) 875 mg tablet; Take 1 tablet (875 mg total) by mouth 2 (two) times a day for 10 days  -     ipratropium-albuterol (DUO-NEB) 0 5-2 5 mg/3 mL nebulizer solution; Take 1 vial (3 mL total) by nebulization 3 (three) times a day for 10 days    Persistent cough  -     ipratropium-albuterol (DUO-NEB) 0 5-2 5 mg/3 mL nebulizer solution; Take 1 vial (3 mL total) by nebulization 3 (three) times a day for 10 days  -     dextromethorphan-guaiFENesin (ROBITUSSIN DM)  mg/5 mL syrup; Take 5 mL by mouth 3 (three) times a day as needed for cough    BMI 40 0-44 9, adult (HCC)          Return if symptoms worsen or fail to improve  Subjective:      Patient ID: Sherri Lind is a 40 y o  female  Chief Complaint   Patient presents with    Cough     for 1month prcma       HPI   Patient stated that she was seen for cough and sinus infection couple of weeks ago and was treated with Medrol pack and stated that her sinus infection is resolved but still having cough and stated that worse in the morning and phlegmy and feels like choking with the cough at times  Denies fever, chills and sob  Not taking any medication OTC currently       The following portions of the patient's history were reviewed and updated as appropriate: allergies, current medications, past family history, past medical history, past social history, past surgical history and problem list       Review of Systems Constitutional: Negative for chills, fatigue and fever  HENT: Negative for congestion, ear discharge, ear pain, facial swelling, hearing loss, mouth sores, nosebleeds, postnasal drip, rhinorrhea, sinus pain, sinus pressure, sneezing, sore throat, trouble swallowing and voice change  Respiratory: Positive for cough  Negative for chest tightness, shortness of breath and wheezing  Cardiovascular: Negative  Gastrointestinal: Negative for abdominal pain, constipation, diarrhea and nausea  Neurological: Negative for dizziness, weakness, light-headedness and headaches  Objective:    History   Smoking Status    Never Smoker   Smokeless Tobacco    Never Used       Allergies: No Known Allergies    Vitals:  /82   Pulse 68   Temp 97 6 °F (36 4 °C)   Resp 16   Ht 5' 2" (1 575 m)   Wt 104 kg (228 lb 6 4 oz)   BMI 41 77 kg/m²     Current Outpatient Prescriptions   Medication Sig Dispense Refill    ALPRAZolam (XANAX) 0 25 mg tablet Take 1 tablet by mouth daily as needed      escitalopram (LEXAPRO) 20 mg tablet Take 1 tablet by mouth daily      levonorgestrel (MIRENA, 52 MG,) 20 MCG/24HR IUD by Intrauterine route Twice daily      amoxicillin (AMOXIL) 875 mg tablet Take 1 tablet (875 mg total) by mouth 2 (two) times a day for 10 days 20 tablet 0    dextromethorphan-guaiFENesin (ROBITUSSIN DM)  mg/5 mL syrup Take 5 mL by mouth 3 (three) times a day as needed for cough 118 mL 0    ipratropium-albuterol (DUO-NEB) 0 5-2 5 mg/3 mL nebulizer solution Take 1 vial (3 mL total) by nebulization 3 (three) times a day for 10 days 90 mL 0     No current facility-administered medications for this visit  Physical Exam   Constitutional: She is oriented to person, place, and time  She appears well-developed and well-nourished  HENT:   Head: Normocephalic  Right Ear: External ear and ear canal normal  Tympanic membrane is erythematous and bulging     Left Ear: External ear and ear canal normal  Tympanic membrane is retracted  Nose: Nose normal  Right sinus exhibits no maxillary sinus tenderness and no frontal sinus tenderness  Left sinus exhibits no maxillary sinus tenderness and no frontal sinus tenderness  Mouth/Throat: Oropharynx is clear and moist and mucous membranes are normal    Neck: Neck supple  Cardiovascular: Normal rate, regular rhythm and normal heart sounds  Pulmonary/Chest: Effort normal and breath sounds normal    Abdominal: Normal appearance  There is no hepatosplenomegaly  Musculoskeletal: Normal range of motion  Lymphadenopathy:        Right cervical: No superficial cervical and no posterior cervical adenopathy present  Left cervical: No superficial cervical and no posterior cervical adenopathy present  Neurological: She is alert and oriented to person, place, and time  Skin: Skin is warm and dry  Psychiatric: She has a normal mood and affect  Her behavior is normal  Judgment and thought content normal    Vitals reviewed          YESY Dc

## 2018-06-13 NOTE — PATIENT INSTRUCTIONS
Take medication with food  It is important that you take the entire course of antibiotics prescribed  May also take a probiotic of your choice to maintain healthy GI sami  Can take some probiotic and yogurt with the medication  Supportive care discussed and advised  Follow up for no improvement and worsening of conditions  Patient advised and educated when to see immediate medical care  Otitis Media   AMBULATORY CARE:   Otitis media  is an ear infection  Common symptoms include the following:   · Fever or a headache    · Ear pain    · Trouble hearing    · Ear feels plugged or full or you have ringing or buzzing in your ear    · Dizziness or you lose your balance    · Nausea or vomiting  Seek immediate care for the following symptoms:   · Seizure    · Fever and a stiff neck  Treatment for otitis media  may include any of the following:  · NSAIDs , such as ibuprofen, help decrease swelling, pain, and fever  This medicine is available with or without a doctor's order  NSAIDs can cause stomach bleeding or kidney problems in certain people  If you take blood thinner medicine, always ask your healthcare provider if NSAIDs are safe for you  Always read the medicine label and follow directions  · Ear drops  to help treat your ear pain  · Antibiotics  to help kill the germs that caused your ear infection  Care for otitis media:   · Use heat  Place a warm, moist washcloth on your ear to decrease pain  Apply for 15 to 20 minutes, 3 to 4 times a day    · Use ice  Ice helps decrease swelling and pain  Use an ice pack or put crushed ice in a plastic bag  Cover the ice pack with a towel and place it on your ear for 15 to 20 minutes, 3 to 4 times a day for 2 days  Prevent otitis media:   · Wash your hands often  This will help prevent the spread of germs  Encourage everyone in your house to wash their hands with soap and water after they use the bathroom   Everyone should also wash their hands after they change a child's diaper and before they prepare or eat food  · Stay away from people who are ill  Germs are easily and quickly spread through contact  Follow up with your healthcare provider as directed:  Write down your questions so you remember to ask them during your visits  © 2017 [employer] Information is for End User's use only and may not be sold, redistributed or otherwise used for commercial purposes  All illustrations and images included in CareNotes® are the copyrighted property of [employer] , [employer]  or [employer] Johnson  The above information is an  only  It is not intended as medical advice for individual conditions or treatments  Talk to your doctor, nurse or pharmacist before following any medical regimen to see if it is safe and effective for you  Amoxicillin (By mouth)   Amoxicillin ([id number]-[id number])  Treats infections or stomach ulcers  This medicine is a penicillin antibiotic  Brand Name(s): Amoxil, Moxatag, Omeclamox-Raymon, Prevpac, Triple Therapy   There may be other brand names for this medicine  When This Medicine Should Not Be Used: This medicine is not right for everyone  You should not use it if you had an allergic reaction to amoxicillin, any type of penicillin, or a cephalosporin antibiotic  How to Use This Medicine:   Capsule, Liquid, Tablet, Chewable Tablet, Long Acting Tablet  · Your doctor will tell you how much medicine to use  Do not use more than directed  · Chewable tablet: You must chew the tablet before you swallow it  You may crush the tablet and mix the medicine with a small amount of food to make it easier to swallow  · Oral liquid: Shake well just before each use  · Measure the oral liquid medicine with a marked measuring spoon, oral syringe, or medicine cup  You may mix the oral liquid with a baby formula, milk, fruit juice, water, ginger ale, or another cold drink   Be sure your child drinks all of the mixture right away   · Tablet for suspension: Place the tablet in a small drinking glass, and add 2 teaspoons of water  Do not use any other liquid  Gently stir or swirl the water in the glass until the tablet is completely dissolved  Drink all of this mixture right away  Add more water to the glass and drink all of it to make sure you get all of the medicine  Do not chew or swallow the tablet for suspension  · Take all of the medicine in your prescription to clear up your infection, even if you feel better after the first few doses  · Take a dose as soon as you remember  If it is almost time for your next dose, wait until then and take a regular dose  Do not take extra medicine to make up for a missed dose  · Store the tablets, capsules, and tablets for suspension at room temperature, away from heat, moisture, and direct light  · Store the oral liquid in the refrigerator  Do not freeze  Throw away any unused medicine after 14 days  Drugs and Foods to Avoid:   Ask your doctor or pharmacist before using any other medicine, including over-the-counter medicines, vitamins, and herbal products  · Some medicines can affect how amoxicillin works  Tell your doctor if you are also using any of the following:   ¨ Allopurinol  ¨ Probenecid  ¨ Birth control pills  ¨ A blood thinner  Warnings While Using This Medicine:   · Tell your doctor if you are pregnant or breastfeeding, or if you have kidney disease, allergies, or a condition called phenylketonuria (PKU)  Tell your doctor if you are on dialysis  · This medicine can cause diarrhea  Call your doctor if the diarrhea becomes severe, does not stop, or is bloody  Do not take any medicine to stop diarrhea until you have talked to your doctor  Diarrhea can occur 2 months or more after you stop taking this medicine  · Tell any doctor or dentist who treats you that you are using this medicine  This medicine may affect certain medical test results    · Call your doctor if your symptoms do not improve or if they get worse  · Use this medicine to treat only the infection your doctor has prescribed it for  Do not use this medicine for any infection or condition that has not been checked by a doctor  This medicine will not treat the flu or the common cold  · Keep all medicine out of the reach of children  Never share your medicine with anyone  Possible Side Effects While Using This Medicine:   Call your doctor right away if you notice any of these side effects:  · Allergic reaction: Itching or hives, swelling in your face or hands, swelling or tingling in your mouth or throat, chest tightness, trouble breathing  · Blistering, peeling, or red skin rash  · Diarrhea that may contain blood, stomach cramps, fever  If you notice these less serious side effects, talk with your doctor:   · Mild diarrhea, nausea, or vomiting  · Mild skin rash  If you notice other side effects that you think are caused by this medicine, tell your doctor  Call your doctor for medical advice about side effects  You may report side effects to FDA at 1-847-FDA-3167  © 2017 [address] [employer] Information is for End User's use only and may not be sold, redistributed or otherwise used for commercial purposes  The above information is an  only  It is not intended as medical advice for individual conditions or treatments  Talk to your doctor, nurse or pharmacist before following any medical regimen to see if it is safe and effective for you

## 2018-06-13 NOTE — LETTER
June 13, 2018     Patient: Aldair Rodriguez   YOB: 1975   Date of Visit: 6/13/2018       To Whom it May Concern:    Aldair Rodriguez is under my professional care  She was seen in my office on 6/13/2018  If you have any questions or concerns, please don't hesitate to call           Sincerely,          YESY Epps        CC: No Recipients

## 2018-07-30 ENCOUNTER — TRANSCRIBE ORDERS (OUTPATIENT)
Dept: ADMINISTRATIVE | Facility: HOSPITAL | Age: 43
End: 2018-07-30

## 2018-07-30 ENCOUNTER — APPOINTMENT (OUTPATIENT)
Dept: LAB | Facility: HOSPITAL | Age: 43
End: 2018-07-30

## 2018-07-30 DIAGNOSIS — Z00.8 HEALTH EXAMINATION IN POPULATION SURVEY: Primary | ICD-10-CM

## 2018-07-30 LAB
CHOLEST SERPL-MCNC: 232 MG/DL (ref 50–200)
EST. AVERAGE GLUCOSE BLD GHB EST-MCNC: 108 MG/DL
HBA1C MFR BLD: 5.4 % (ref 4.2–6.3)
HDLC SERPL-MCNC: 51 MG/DL (ref 40–60)
LDLC SERPL CALC-MCNC: 151 MG/DL (ref 0–100)
NONHDLC SERPL-MCNC: 181 MG/DL
TRIGL SERPL-MCNC: 152 MG/DL

## 2018-07-30 PROCEDURE — 36415 COLL VENOUS BLD VENIPUNCTURE: CPT

## 2018-07-30 PROCEDURE — 80061 LIPID PANEL: CPT | Performed by: PREVENTIVE MEDICINE

## 2018-07-30 PROCEDURE — 83036 HEMOGLOBIN GLYCOSYLATED A1C: CPT

## 2018-09-27 ENCOUNTER — OFFICE VISIT (OUTPATIENT)
Dept: FAMILY MEDICINE CLINIC | Facility: CLINIC | Age: 43
End: 2018-09-27
Payer: COMMERCIAL

## 2018-09-27 VITALS
WEIGHT: 224.8 LBS | DIASTOLIC BLOOD PRESSURE: 92 MMHG | BODY MASS INDEX: 41.37 KG/M2 | SYSTOLIC BLOOD PRESSURE: 136 MMHG | HEIGHT: 62 IN | RESPIRATION RATE: 16 BRPM | TEMPERATURE: 98.8 F | HEART RATE: 80 BPM

## 2018-09-27 DIAGNOSIS — J06.9 ACUTE URI: Primary | ICD-10-CM

## 2018-09-27 PROCEDURE — 3008F BODY MASS INDEX DOCD: CPT | Performed by: NURSE PRACTITIONER

## 2018-09-27 PROCEDURE — 1036F TOBACCO NON-USER: CPT | Performed by: NURSE PRACTITIONER

## 2018-09-27 PROCEDURE — 99213 OFFICE O/P EST LOW 20 MIN: CPT | Performed by: NURSE PRACTITIONER

## 2018-09-27 NOTE — LETTER
September 27, 2018     Patient: Megan Sierra   YOB: 1975   Date of Visit: 9/27/2018       To Whom it May Concern:    Megan Sierra is under my professional care  She was seen in my office on 9/27/2018  She may return to work on 09/28/2018  If you have any questions or concerns, please don't hesitate to call           Sincerely,          YSEY Forte        CC: Megan Sierra

## 2018-09-27 NOTE — PATIENT INSTRUCTIONS
Increase fluid intake, saline nasal rinses, and hot tea with honey and lemon  Cool air humidification can be helpful as well  May take Ibuprofen or Tylenol as needed for pain or fevers  Mucinex D for sinus congestion or Coricidin HBP if you have high blood pressure or a heart condition  Mucinex or Robitussin DM are effective for cough and chest congestion  Supportive care discussed and advised  Follow up for no improvement and worsening of conditions  Patient advised and educated when to see immediate medical care

## 2018-09-27 NOTE — PROGRESS NOTES
Assessment/Plan:  Improving and continue with Robitussin and supportive care  Increase fluid intake, saline nasal rinses, and hot tea with honey and lemon  Cool air humidification can be helpful as well  May take Ibuprofen or Tylenol as needed for pain or fevers  Mucinex D for sinus congestion or Coricidin HBP if you have high blood pressure or a heart condition  Mucinex or Robitussin DM are effective for cough and chest congestion  Supportive care discussed and advised  Follow up for no improvement and worsening of conditions  Patient advised and educated when to see immediate medical care  Diagnoses and all orders for this visit:    Acute URI  Comments:  Supportive care discussed and advised  BMI 40 0-44 9, adult [facility])          Return if symptoms worsen or fail to improve  Subjective:      Patient ID: Christiano De La Cruz is a 40 y o  female  Chief Complaint   Patient presents with    head cold     patient states symtpoms started on Monday 9/24/18  af/rma     Headache       HPI  Patient stated that having cough, congestion with headache which started about 3 days ago  Taking Robitussin and stated that feeling better today  Denies any fever, chills, sob and dizziness  The following portions of the patient's history were reviewed and updated as appropriate: allergies, current medications, past family history, past medical history, past social history, past surgical history and problem list       Review of Systems   Constitutional: Negative for chills, fatigue and fever  HENT: Positive for congestion  Negative for ear discharge, ear pain, facial swelling, hearing loss, mouth sores, nosebleeds, postnasal drip, rhinorrhea, sinus pain, sinus pressure, sneezing, sore throat, trouble swallowing and voice change  Respiratory: Positive for cough  Negative for chest tightness, shortness of breath and wheezing  Cardiovascular: Negative      Gastrointestinal: Negative for abdominal pain, constipation, diarrhea and nausea  Neurological: Positive for headaches  Negative for dizziness, weakness and light-headedness  Objective:    History   Smoking Status    Never Smoker   Smokeless Tobacco    Never Used       Allergies: No Known Allergies    Vitals:  /92   Pulse 80   Temp 98 8 °F (37 1 °C)   Resp 16   Ht 5' 2" (1 575 m)   Wt 102 kg (224 lb 12 8 oz)   BMI 41 12 kg/m²     Current Outpatient Prescriptions   Medication Sig Dispense Refill    dextromethorphan-guaiFENesin (ROBITUSSIN DM)  mg/5 mL syrup Take 5 mL by mouth 3 (three) times a day as needed for cough 118 mL 0    escitalopram (LEXAPRO) 20 mg tablet Take 1 tablet by mouth daily      levonorgestrel (MIRENA, 52 MG,) 20 MCG/24HR IUD by Intrauterine route Twice daily      ALPRAZolam (XANAX) 0 25 mg tablet Take 1 tablet by mouth daily as needed       No current facility-administered medications for this visit  Physical Exam   Constitutional: She is oriented to person, place, and time  She appears well-developed and well-nourished  HENT:   Head: Normocephalic  Right Ear: Tympanic membrane, external ear and ear canal normal    Left Ear: Tympanic membrane, external ear and ear canal normal    Nose: Nose normal  Right sinus exhibits no maxillary sinus tenderness and no frontal sinus tenderness  Left sinus exhibits no maxillary sinus tenderness and no frontal sinus tenderness  Mouth/Throat: Oropharynx is clear and moist and mucous membranes are normal    Neck: Neck supple  Cardiovascular: Normal rate, regular rhythm and normal heart sounds  Pulmonary/Chest: Effort normal and breath sounds normal    Abdominal: Normal appearance and bowel sounds are normal  There is no hepatosplenomegaly  There is no tenderness  There is no rebound  Musculoskeletal: Normal range of motion  Lymphadenopathy:        Right cervical: No superficial cervical and no posterior cervical adenopathy present         Left cervical: No superficial cervical and no posterior cervical adenopathy present  Neurological: She is alert and oriented to person, place, and time  Skin: Skin is warm and dry  Psychiatric: She has a normal mood and affect  Her behavior is normal  Judgment and thought content normal    Vitals reviewed          YESY Gracia

## 2018-12-24 ENCOUNTER — APPOINTMENT (OUTPATIENT)
Dept: RADIOLOGY | Age: 43
End: 2018-12-24
Payer: COMMERCIAL

## 2018-12-24 ENCOUNTER — OFFICE VISIT (OUTPATIENT)
Dept: URGENT CARE | Age: 43
End: 2018-12-24
Payer: COMMERCIAL

## 2018-12-24 VITALS
HEART RATE: 73 BPM | BODY MASS INDEX: 41.22 KG/M2 | WEIGHT: 224 LBS | HEIGHT: 62 IN | SYSTOLIC BLOOD PRESSURE: 157 MMHG | DIASTOLIC BLOOD PRESSURE: 86 MMHG | OXYGEN SATURATION: 97 % | TEMPERATURE: 98.2 F
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
## 2018-12-24 DIAGNOSIS — S20.211A RIB CONTUSION, RIGHT, INITIAL ENCOUNTER: ICD-10-CM

## 2018-12-24 DIAGNOSIS — S46.911A SHOULDER STRAIN, RIGHT, INITIAL ENCOUNTER: ICD-10-CM
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
## 2018-12-24 PROCEDURE — 71101 X-RAY EXAM UNILAT RIBS/CHEST: CPT

## 2018-12-24 PROCEDURE — 99213 OFFICE O/P EST LOW 20 MIN: CPT | Performed by: FAMILY MEDICINE

## 2018-12-24 PROCEDURE — 73030 X-RAY EXAM OF SHOULDER: CPT

## 2018-12-24 PROCEDURE — S9088 SERVICES PROVIDED IN URGENT: HCPCS | Performed by: FAMILY MEDICINE

## 2018-12-24 PROCEDURE — 72040 X-RAY EXAM NECK SPINE 2-3 VW: CPT

## 2018-12-24 NOTE — LETTER
December 24, 2018     Patient: Kali Haider   YOB: 1975   Date of Visit: 12/24/2018       To Whom It May Concern: It is my medical opinion that Kali Haider may return to work on 12/27/2018  If you have any questions or concerns, please don't hesitate to call           Sincerely,        YESY Martínez    CC: No Recipients

## 2018-12-24 NOTE — PROGRESS NOTES
[facility]        NAME: Deja Deleon is a 44 y o  female  : 1975    MRN: [id number]  DATE: 2018  TIME: 12:25 PM    Assessment and Plan   Fall, initial encounter [W19  XXXA]  1  Fall, initial encounter  XR spine cervical 2 or 3 vw injury    XR ribs right w pa chest min 3 views    XR shoulder 2+ vw right   2  Rib contusion, right, initial encounter     3  Shoulder strain, right, initial encounter           Patient Instructions     Patient Instructions   No acute fracture  Will call if final read is different  Rest   Ice every 3-4 hours  Motrin or Tylenol as needed for pain  If you develop headache, nausea, vomiting, dizziness, change in gait or confusion go directly to ER  Follow up with ortho if symptoms persist        Chief Complaint     Chief Complaint   Patient presents with    Head Injury     accident in kitchen     Back Injury    Rib Injury         History of Present Illness   Deja Deleon presents to the clinic c/o    This is a 44year old female here today after having injury yesterday  She states yesterday she was in her kitchen cleaning microwave  She states shelf and microwave fell onto her head  She states she then fell to the ground  She fell onto her right rib region  She states her right arm was raised when she fell  She states she has slight headache  No LOC  No nausea, vomiting, confusion, change in gait  She has pain with raising her arm  She has no pain over the site  She has a bruise over the right lateral rib region  She has some discomfort on the side of her neck  No pain over the cervical spine  tdap up to date  Review of Systems   Review of Systems   Constitutional: Negative  Respiratory: Negative  Cardiovascular: Negative  Musculoskeletal: Positive for arthralgias  Skin: Negative  Neurological: Negative for dizziness, syncope, weakness, numbness and headaches  Psychiatric/Behavioral: Negative            Current Medications     Long-Term Prescriptions   Medication Sig Dispense Refill    ALPRAZolam (XANAX) 0 25 mg tablet Take 1 tablet by mouth daily as needed      escitalopram (LEXAPRO) 20 mg tablet Take 1 tablet by mouth daily      levonorgestrel (MIRENA, 52 MG,) 20 MCG/24HR IUD by Intrauterine route Twice daily         Current Allergies     Allergies as of 12/24/2018    (No Known Allergies)            The following portions of the patient's history were reviewed and updated as appropriate: allergies, current medications, past family history, past medical history, past social history, past surgical history and problem list     Objective   /86   Pulse 73   Temp 98 2 °F (36 8 °C)   Ht 5' 2" (1 575 m)   Wt 102 kg (224 lb)   SpO2 97%   BMI 40 97 kg/m²        Physical Exam     Physical Exam   Constitutional: She appears well-developed and well-nourished  Neck: Normal range of motion  Neck supple  Cardiovascular: Normal rate, regular rhythm and normal heart sounds  Pulmonary/Chest: Effort normal and breath sounds normal    Musculoskeletal:   Cervical spine: no ttp over the spine  TTP over the right side of neck into the top the shoulder  Full rom  Negative full and empty can testing  Negative drop arm testing  Right rib region: bruising over lateral ribs,  ttp over site  Mild swellling      Skin:   Small abrasion over head, no open areas,  No swelling or bruising  Psychiatric: She has a normal mood and affect  Her behavior is normal    Nursing note and vitals reviewed

## 2018-12-24 NOTE — PATIENT INSTRUCTIONS
No acute fracture  Will call if final read is different  Rest   Ice every 3-4 hours  Motrin or Tylenol as needed for pain  If you develop headache, nausea, vomiting, dizziness, change in gait or confusion go directly to ER    Follow up with ortho if symptoms persist

## 2019-01-12 PROBLEM — Z01.419 WELL WOMAN EXAM: Status: ACTIVE | Noted: 2019-01-12

## 2019-01-12 NOTE — PROGRESS NOTES
Assessment/Plan   Diagnoses and all orders for this visit:    Well woman exam  -     Liquid-based pap, screening    Breast cancer screening  -     Mammo diagnostic bilateral w 3d & cad; Future  -     US breast right limited (diagnostic); Future    Other orders  -     Cancel: Mammo screening bilateral w 3d & cad; Future        Discussion    All questions have been answered to her satisfaction  RTO for APE ans sooner for IUD removal and reinsertion  Subjective     Pt for annual GYN exam  Mirena for [facility]  Due for removal 1/2019, desires reinsertion  No menses on Mirena  No vaginal concerns  No problems  Decliens STD workup, monogamous relationship  Pt does c/o incontinence  No sx of UTI, but urgency and leakage  I did r/w pt timed bladder emptying and avoidance of bladder irritants  Advised trial of kegels and weight loss  If no improvement of sx after those recommendations will consider urology work up  Jacki Ray is a 44 y o  female who presents for annual well woman exam    Menarche -7 ; LMP - 1/2009; Periods are not present while using the Mirena for birth control  No vulvar itch/burn; No vaginal itch/burn; No abn discharge or odor; No urinary sx - burning/pain/frequency/hematuria  (+) SBEs - no breast masses, asymmetry, nipple discharge or bleeding, changes in skin of breast, or breast tenderness bilaterally  No abd/pelvic pain or HAs;   Pt is sexually active in a mutually monog/ sexual relationship; No issues with intercourse; She declines std/hiv/hep testing; Feels safe at home  Current contraception: MIrena  Condom use:none  (+) PCP for routine Bw/care; Last Pap - 2/26/16 WNL neg HPV  History of abnormal Pap smear:   Mammo:6/17 abnormal was due for 6 mth f/u US 12/17, will plan diagnostic mammo and US at this time as she did not have advised follow up  Review of Systems   Constitutional: Negative for fatigue, fever and unexpected weight change     HENT: Negative for dental problem, mouth sores, nosebleeds, rhinorrhea, sinus pain, sinus pressure and sore throat  Eyes: Negative for pain, discharge and visual disturbance  Respiratory: Negative for cough, chest tightness, shortness of breath and wheezing  Cardiovascular: Negative for chest pain, palpitations and leg swelling  Gastrointestinal: Negative for blood in stool, constipation, diarrhea, nausea and vomiting  Endocrine: Negative for polydipsia  Genitourinary: Negative for difficulty urinating, dyspareunia, dysuria, menstrual problem, pelvic pain, urgency, vaginal discharge and vaginal pain  Musculoskeletal: Negative for arthralgias, back pain and joint swelling  Allergic/Immunologic: Negative for environmental allergies  Neurological: Negative for seizures, light-headedness and headaches  Hematological: Does not bruise/bleed easily  Psychiatric/Behavioral: Negative for sleep disturbance  The patient is not nervous/anxious  The following portions of the patient's history were reviewed and updated as appropriate: current medications, past family history, past medical history, past social history, past surgical history and problem list          OB History      Para Term  AB Living    1 1            SAB TAB Ectopic Multiple Live Births          1 2          Past Medical History:   Diagnosis Date    Depression        Past Surgical History:   Procedure Laterality Date    CHOLECYSTECTOMY  2015    [employer]    FERTILITY SURGERY      [employer]       Family History   Problem Relation Age of Onset    Vision loss Mother     Stroke Father     Asthma Sister     Melanoma Maternal Grandmother        Social History     Social History    Marital status: /Civil Union     Spouse name: N/A    Number of children: N/A    Years of education: N/A     Occupational History    Not on file       Social History Main Topics    Smoking status: Never Smoker    Smokeless tobacco: Never Used   [employer] Alcohol use No    Drug use: No    Sexual activity: Yes     Partners: Male     Birth control/ protection: IUD     Other Topics Concern    Not on file     Social History Narrative    No narrative on file         Current Outpatient Prescriptions:     escitalopram (LEXAPRO) 20 mg tablet, Take 1 tablet by mouth daily, Disp: , Rfl:     levonorgestrel (MIRENA, 52 MG,) 20 MCG/24HR IUD, by Intrauterine route Twice daily, Disp: , Rfl:     No Known Allergies    Objective   Vitals:    01/14/19 1316   BP: 152/88   BP Location: Left arm   Patient Position: Sitting   Cuff Size: Large   Weight: 107 kg (235 lb)   Height: 5' 2" (1 575 m)     Physical Exam   Constitutional: She is oriented to person, place, and time  She appears well-developed and well-nourished  HENT:   Head: Normocephalic and atraumatic  Cardiovascular: Normal rate and regular rhythm  Pulmonary/Chest: Effort normal and breath sounds normal  Right breast exhibits no inverted nipple, no mass, no nipple discharge, no skin change and no tenderness  Left breast exhibits no inverted nipple, no mass, no nipple discharge, no skin change and no tenderness  Breasts are symmetrical    Abdominal: Soft  She exhibits no distension and no mass  There is no rebound and no guarding  Genitourinary: Vagina normal and uterus normal  Rectal exam shows guaiac negative stool  No vaginal discharge found  Neurological: She is alert and oriented to person, place, and time  Skin: Skin is warm and dry  Psychiatric: She has a normal mood and affect  Patient Instructions   Pt will sign consent today to check Mirena coverage we will plan to order it then plan removal and reinsertion once it arrives  Pap done today  Will plan diagnostic mammo and r breast US as she did not follow up as she was previously advised

## 2019-01-14 ENCOUNTER — ANNUAL EXAM (OUTPATIENT)
Dept: OBGYN CLINIC | Facility: CLINIC | Age: 44
End: 2019-01-14
Payer: COMMERCIAL

## 2019-01-14 VITALS
WEIGHT: 235 LBS | SYSTOLIC BLOOD PRESSURE: 152 MMHG | DIASTOLIC BLOOD PRESSURE: 88 MMHG | BODY MASS INDEX: 43.24 KG/M2 | HEIGHT: 62 IN

## 2019-01-14 DIAGNOSIS — Z12.39 BREAST CANCER SCREENING: ICD-10-CM

## 2019-01-14 DIAGNOSIS — Z01.419 WELL WOMAN EXAM: Primary | ICD-10-CM

## 2019-01-14 PROCEDURE — 87624 HPV HI-RISK TYP POOLED RSLT: CPT | Performed by: PHYSICIAN ASSISTANT

## 2019-01-14 PROCEDURE — 99396 PREV VISIT EST AGE 40-64: CPT | Performed by: PHYSICIAN ASSISTANT

## 2019-01-14 PROCEDURE — G0145 SCR C/V CYTO,THINLAYER,RESCR: HCPCS | Performed by: PHYSICIAN ASSISTANT

## 2019-01-14 NOTE — PATIENT INSTRUCTIONS
Pt will sign consent today to check Mirena coverage we will plan to order it then plan removal and reinsertion once it arrives  Pap done today  Will plan diagnostic mammo and r breast US as she did not follow up as she was previously advised

## 2019-01-15 ENCOUNTER — TELEPHONE (OUTPATIENT)
Dept: OBGYN CLINIC | Facility: CLINIC | Age: 44
End: 2019-01-15

## 2019-01-15 LAB
HPV HR 12 DNA CVX QL NAA+PROBE: NEGATIVE
HPV16 DNA CVX QL NAA+PROBE: NEGATIVE
HPV18 DNA CVX QL NAA+PROBE: NEGATIVE

## 2019-01-18 LAB
LAB AP GYN PRIMARY INTERPRETATION: NORMAL
Lab: NORMAL

## 2019-01-23 ENCOUNTER — OFFICE VISIT (OUTPATIENT)
Dept: FAMILY MEDICINE CLINIC | Facility: CLINIC | Age: 44
End: 2019-01-23
Payer: COMMERCIAL

## 2019-01-23 ENCOUNTER — TELEPHONE (OUTPATIENT)
Dept: FAMILY MEDICINE CLINIC | Facility: CLINIC | Age: 44
End: 2019-01-23

## 2019-01-23 VITALS
TEMPERATURE: 101 F | HEIGHT: 62 IN | BODY MASS INDEX: 42.8 KG/M2 | DIASTOLIC BLOOD PRESSURE: 88 MMHG | HEART RATE: 86 BPM | WEIGHT: 232.6 LBS | RESPIRATION RATE: 22 BRPM | SYSTOLIC BLOOD PRESSURE: 142 MMHG

## 2019-01-23 DIAGNOSIS — J06.9 ACUTE UPPER RESPIRATORY INFECTION: Primary | ICD-10-CM

## 2019-01-23 PROCEDURE — 99213 OFFICE O/P EST LOW 20 MIN: CPT | Performed by: NURSE PRACTITIONER

## 2019-01-23 RX ORDER — ALBUTEROL SULFATE 90 UG/1
2 AEROSOL, METERED RESPIRATORY (INHALATION) EVERY 4 HOURS PRN
Qty: 18 G | Refills: 0 | Status: SHIPPED | OUTPATIENT
Start: 2019-01-23 | End: 2021-08-02 | Stop reason: ALTCHOICE

## 2019-01-23 RX ORDER — FLUTICASONE PROPIONATE 50 MCG
2 SPRAY, SUSPENSION (ML) NASAL DAILY
Qty: 16 G | Refills: 1 | Status: SHIPPED | OUTPATIENT
Start: 2019-01-23 | End: 2019-03-07 | Stop reason: ALTCHOICE

## 2019-01-23 NOTE — TELEPHONE ENCOUNTER
No, it really doesn't  She is out of the window for Tamiflu at this point, so it would just be supportive care as we discussed at her appointment  If she needs a work extension if her illness continues, she can certainly call and I will do that for her    Nubia Ruiz

## 2019-01-23 NOTE — LETTER
January 23, 2019     Patient: Elen Nunez   YOB: 1975   Date of Visit: 1/23/2019       To Whom it May Concern:    Elen Nunez is under my professional care  She was seen in my office on 1/23/2019  Please excuse from work 1/23/19 and 1/24/19    If you have any questions or concerns, please don't hesitate to call           Sincerely,          YESY Jauregui        CC: No Recipients

## 2019-01-23 NOTE — TELEPHONE ENCOUNTER
Patient was in this morning to see Denise and diagnosed with a Viral infection  She just got out of the doctors with her children and one was diagnosed with the Flu    She wants to know if this changes anything with her and her medication/diagnosis    Please call Summer Collazo at  [phone]

## 2019-01-23 NOTE — PROGRESS NOTES
Assessment/Plan:    Reviewed supportive care of viral illness  Will start on Flonase and Ventolin  Push fluids, salt water gargles, and hot tea with honey and lemon  Problem List Items Addressed This Visit     None      Visit Diagnoses     Acute upper respiratory infection    -  Primary    Relevant Medications    albuterol (VENTOLIN HFA) 90 mcg/act inhaler    fluticasone (FLONASE) 50 mcg/act nasal spray          Patient Instructions   Robitussin DM over the counter      Return if symptoms worsen or fail to improve  Subjective:      Patient ID: Kerwin Au is a 44 y o  female  Chief Complaint   Patient presents with    Cough     has been using mucinex  [facility] lpn       For the past 3 days, she has had a cough and chest congestion  Cough intermittently productive  Feels hoarse and lost her voice  Denies sinus congestion, fever, SOB, or wheezing  Taking Mucinex OTC which doesn't help  Kids are sick with similar symptoms  The following portions of the patient's history were reviewed and updated as appropriate: allergies, current medications, past family history, past medical history, past social history, past surgical history and problem list     Review of Systems   Constitutional: Negative for chills, fatigue and fever  HENT: Positive for voice change  Negative for congestion, ear pain, postnasal drip, rhinorrhea, sinus pressure and sore throat  Respiratory: Positive for cough and shortness of breath  Negative for wheezing  Cardiovascular: Negative for chest pain  Gastrointestinal: Negative for abdominal pain, diarrhea, nausea and vomiting  Musculoskeletal: Negative for arthralgias  Skin: Negative for rash  Neurological: Positive for headaches           Current Outpatient Prescriptions   Medication Sig Dispense Refill    escitalopram (LEXAPRO) 20 mg tablet Take 1 tablet by mouth daily      levonorgestrel (MIRENA, 52 MG,) 20 MCG/24HR IUD by Intrauterine route Twice daily      albuterol (VENTOLIN HFA) 90 mcg/act inhaler Inhale 2 puffs every 4 (four) hours as needed for wheezing 18 g 0    fluticasone (FLONASE) 50 mcg/act nasal spray 2 sprays into each nostril daily 16 g 1     No current facility-administered medications for this visit  Objective:    /88   Pulse 86   Temp (!) 101 °F (38 3 °C)   Resp 22   Ht 5' 2" (1 575 m)   Wt 106 kg (232 lb 9 6 oz)   BMI 42 54 kg/m²        Physical Exam   Constitutional: She appears well-developed and well-nourished  HENT:   Head: Normocephalic and atraumatic  Right Ear: External ear and ear canal normal  Tympanic membrane is bulging  Left Ear: External ear and ear canal normal  Tympanic membrane is bulging  Nose: No mucosal edema or rhinorrhea  Mouth/Throat: Uvula is midline, oropharynx is clear and moist and mucous membranes are normal    Post nasal drip   Eyes: Conjunctivae are normal    Neck: Neck supple  No edema present  No thyromegaly present  Cardiovascular: Normal rate, regular rhythm, normal heart sounds and intact distal pulses  No murmur heard  Pulmonary/Chest: Effort normal  She has wheezes  Abdominal: Bowel sounds are normal  She exhibits no distension  There is no splenomegaly or hepatomegaly  There is no tenderness  Lymphadenopathy:        Right cervical: No superficial cervical adenopathy present  Left cervical: No superficial cervical adenopathy present  Skin: Skin is warm, dry and intact  No rash noted  Psychiatric: She has a normal mood and affect  Nursing note and vitals reviewed               Oliva Mcgrath

## 2019-01-28 ENCOUNTER — HOSPITAL ENCOUNTER (OUTPATIENT)
Dept: RADIOLOGY | Facility: HOSPITAL | Age: 44
Discharge: HOME | End: 2019-01-28
Payer: COMMERCIAL

## 2019-01-28 VITALS — WEIGHT: 232 LBS | BODY MASS INDEX: 42.69 KG/M2 | HEIGHT: 62 IN

## 2019-01-28 DIAGNOSIS — Z12.39 BREAST CANCER SCREENING: ICD-10-CM

## 2019-01-28 DIAGNOSIS — R92.8 ABNORMAL MAMMOGRAM OF RIGHT BREAST: ICD-10-CM

## 2019-01-28 PROCEDURE — G0279 TOMOSYNTHESIS, MAMMO: HCPCS

## 2019-01-28 PROCEDURE — 76642 ULTRASOUND BREAST LIMITED: CPT

## 2019-01-28 PROCEDURE — 77066 DX MAMMO INCL CAD BI: CPT

## 2019-01-31 DIAGNOSIS — R92.8 ABNORMAL MAMMOGRAM: Primary | ICD-10-CM

## 2019-02-08 ENCOUNTER — OFFICE VISIT (OUTPATIENT)
Dept: FAMILY MEDICINE CLINIC | Facility: CLINIC | Age: 44
End: 2019-02-08
Payer: COMMERCIAL

## 2019-02-08 VITALS
WEIGHT: 233 LBS | TEMPERATURE: 99.6 F | HEART RATE: 88 BPM | DIASTOLIC BLOOD PRESSURE: 86 MMHG | BODY MASS INDEX: 42.88 KG/M2 | SYSTOLIC BLOOD PRESSURE: 124 MMHG | HEIGHT: 62 IN | RESPIRATION RATE: 16 BRPM

## 2019-02-08 DIAGNOSIS — R06.2 WHEEZING: ICD-10-CM

## 2019-02-08 DIAGNOSIS — J06.9 ACUTE URI: Primary | ICD-10-CM

## 2019-02-08 PROCEDURE — 99213 OFFICE O/P EST LOW 20 MIN: CPT | Performed by: FAMILY MEDICINE

## 2019-02-08 PROCEDURE — 3008F BODY MASS INDEX DOCD: CPT | Performed by: NURSE PRACTITIONER

## 2019-02-08 RX ORDER — PREDNISONE 10 MG/1
TABLET ORAL
Qty: 15 TABLET | Refills: 0 | Status: SHIPPED | OUTPATIENT
Start: 2019-02-08 | End: 2019-02-13

## 2019-02-08 RX ORDER — AZITHROMYCIN 250 MG/1
TABLET, FILM COATED ORAL
Qty: 6 TABLET | Refills: 0 | Status: SHIPPED | OUTPATIENT
Start: 2019-02-08 | End: 2019-02-13

## 2019-02-08 NOTE — LETTER
February 8, 2019     Patient: Ana Patel   YOB: 1975   Date of Visit: 2/8/2019       To Whom it May Concern:    Ana Patel is under my professional care  She was seen in my office on 2/8/2019  If you have any questions or concerns, please don't hesitate to call           Sincerely,          YESY Galvin        CC: No Recipients

## 2019-02-08 NOTE — PATIENT INSTRUCTIONS
Take medication with food  It is important that you take the entire course of antibiotics prescribed  May also take a probiotic of your choice to maintain healthy GI sami  Can take some probiotic and yogurt with the medication  Increase fluid intake, saline nasal rinses, and hot tea with honey and lemon  Cool air humidification can be helpful as well  May take Ibuprofen or Tylenol as needed for pain or fevers  Mucinex D for sinus congestion or Coricidin HBP if you have high blood pressure or a heart condition  Mucinex or Robitussin DM are effective for cough and chest congestion  Supportive care discussed and advised  Follow up for no improvement and worsening of conditions  Patient advised and educated when to see immediate medical care

## 2019-02-08 NOTE — PROGRESS NOTES
Assessment/Plan:         Diagnoses and all orders for this visit:    Acute URI  -     azithromycin (ZITHROMAX) 250 mg tablet; Take 500mg on day 1, 250mg on days 2-5  -     predniSONE 10 mg tablet; 5 tabs x 1 day, 4 tabs x 1 day, 3 tabs x 1 day, 2 tabs x 1 day, 1 tab x 1 day then stop    Wheezing  Comments:  using nebulizer at home and will start on tapering dose of prednisone    BMI 40 0-44 9, adult [facility])            Patient Instructions: Take medication with food  It is important that you take the entire course of antibiotics prescribed  May also take a probiotic of your choice to maintain healthy GI sami  Can take some probiotic and yogurt with the medication  Increase fluid intake, saline nasal rinses, and hot tea with honey and lemon  Cool air humidification can be helpful as well  May take Ibuprofen or Tylenol as needed for pain or fevers  Mucinex D for sinus congestion or Coricidin HBP if you have high blood pressure or a heart condition  Mucinex or Robitussin DM are effective for cough and chest congestion  Supportive care discussed and advised  Follow up for no improvement and worsening of conditions  Patient advised and educated when to see immediate medical care  Return if symptoms worsen or fail to improve  BMI Counseling: Body mass index is 42 62 kg/m²  Discussed the patient's BMI with her  The BMI is above average  BMI counseling and education was provided to the patient  Exercise recommendations include exercising 3-5 times per week, joining a gym and strength training exercises  Subjective:      Patient ID: Carrie Mccabe is a 44 y o  female  Chief Complaint   Patient presents with    Cough    left ear clogged     lj       HPI  Patient stated that started with cough couple of days and getting worse  Was requiring her inhaler frequently and last night started using neb solution  Also progressed to left ear pain yesterday  Denies fever, chills and sob      The following portions of the patient's history were reviewed and updated as appropriate: allergies, current medications, past family history, past medical history, past social history, past surgical history and problem list       Review of Systems   Constitutional: Negative for chills, fatigue and fever  HENT: Positive for congestion and ear pain  Negative for ear discharge, facial swelling, hearing loss, mouth sores, nosebleeds, postnasal drip, rhinorrhea, sinus pain, sinus pressure, sneezing, sore throat, trouble swallowing and voice change  Respiratory: Positive for cough and wheezing  Negative for chest tightness and shortness of breath  Cardiovascular: Negative  Gastrointestinal: Negative for abdominal pain, constipation, diarrhea and nausea  Neurological: Negative for dizziness, weakness, light-headedness and headaches  Objective:    History   Smoking Status    Never Smoker   Smokeless Tobacco    Never Used       Allergies: No Known Allergies    Vitals:  /86   Pulse 88   Temp 99 6 °F (37 6 °C)   Resp 16   Ht 5' 2" (1 575 m)   Wt 106 kg (233 lb)   BMI 42 62 kg/m²     Current Outpatient Prescriptions   Medication Sig Dispense Refill    albuterol (VENTOLIN HFA) 90 mcg/act inhaler Inhale 2 puffs every 4 (four) hours as needed for wheezing 18 g 0    escitalopram (LEXAPRO) 20 mg tablet Take 1 tablet by mouth daily      levonorgestrel (MIRENA, 52 MG,) 20 MCG/24HR IUD by Intrauterine route Twice daily      azithromycin (ZITHROMAX) 250 mg tablet Take 500mg on day 1, 250mg on days 2-5 6 tablet 0    fluticasone (FLONASE) 50 mcg/act nasal spray 2 sprays into each nostril daily (Patient not taking: Reported on 2/8/2019 ) 16 g 1    predniSONE 10 mg tablet 5 tabs x 1 day, 4 tabs x 1 day, 3 tabs x 1 day, 2 tabs x 1 day, 1 tab x 1 day then stop 15 tablet 0     No current facility-administered medications for this visit             Physical Exam   Constitutional: She is oriented to person, place, and time  She appears well-developed and well-nourished  HENT:   Head: Normocephalic  Right Ear: Tympanic membrane, external ear and ear canal normal    Left Ear: Tympanic membrane, external ear and ear canal normal    Nose: Nose normal  Right sinus exhibits no maxillary sinus tenderness and no frontal sinus tenderness  Left sinus exhibits no maxillary sinus tenderness and no frontal sinus tenderness  Mouth/Throat: Mucous membranes are normal  Posterior oropharyngeal erythema present  Neck: Neck supple  Cardiovascular: Normal rate, regular rhythm and normal heart sounds  Pulmonary/Chest: Effort normal  She has wheezes  Abdominal: Normal appearance and bowel sounds are normal  There is no hepatosplenomegaly  There is no tenderness  There is no rebound  Musculoskeletal: Normal range of motion  Lymphadenopathy:        Right cervical: No superficial cervical and no posterior cervical adenopathy present  Left cervical: No superficial cervical and no posterior cervical adenopathy present  Neurological: She is alert and oriented to person, place, and time  Skin: Skin is warm and dry  Psychiatric: She has a normal mood and affect  Her behavior is normal  Judgment and thought content normal    Vitals reviewed                    YESY Perez

## 2019-03-07 ENCOUNTER — PROCEDURE VISIT (OUTPATIENT)
Dept: OBGYN CLINIC | Facility: CLINIC | Age: 44
End: 2019-03-07
Payer: COMMERCIAL

## 2019-03-07 VITALS
SYSTOLIC BLOOD PRESSURE: 120 MMHG | BODY MASS INDEX: 42.88 KG/M2 | WEIGHT: 233 LBS | HEIGHT: 62 IN | DIASTOLIC BLOOD PRESSURE: 80 MMHG

## 2019-03-07 DIAGNOSIS — Z30.433 ENCOUNTER FOR IUD REMOVAL AND REINSERTION: Primary | ICD-10-CM

## 2019-03-07 PROBLEM — Z01.419 WELL WOMAN EXAM: Status: RESOLVED | Noted: 2019-01-12 | Resolved: 2019-03-07

## 2019-03-07 PROCEDURE — 58300 INSERT INTRAUTERINE DEVICE: CPT | Performed by: OBSTETRICS & GYNECOLOGY

## 2019-03-07 PROCEDURE — 58301 REMOVE INTRAUTERINE DEVICE: CPT | Performed by: OBSTETRICS & GYNECOLOGY

## 2019-03-07 NOTE — PROGRESS NOTES
Iud removal  Date/Time: 3/7/2019 10:50 AM  Performed by: Eleazar Pabon MD  Authorized by: Eleazar Pabon MD     Consent:     Consent obtained:  Verbal    Consent given by:  Patient    Procedure risks and benefits discussed: yes      Patient questions answered: yes      Patient agrees, verbalizes understanding, and wants to proceed: yes      Instructions and paperwork completed: yes    Procedure:     Removed with no complications: yes      Other reason for removal:  Due for new Mirena   Comments:      Patient is here today for removal of her present Mirena IUD and insertion of a new one  She has done well with this IUD and had 1 prior to that as well  She has had no menses and no complaints  Her exam was unremarkable with a small midposition to anterior uterus that was mobile with no adnexal masses  The strings were easily visible at the cervix  The cervix was cleansed with Betadine solution the strings were grasped and the IUD was removed intact  The patient agrees to have it throat in the trash and not sent to pathology  The next procedure was done was insertion is dictated there  Iud insertions  Date/Time: 3/7/2019 10:52 AM  Performed by: Eleazar Pabon MD  Authorized by: Eleazar Pabon MD     Consent:     Consent obtained:  Verbal    Consent given by:  Patient    Procedure risks and benefits discussed: yes      Patient questions answered: yes      Patient agrees, verbalizes understanding, and wants to proceed: yes      Instructions and paperwork completed: yes    Procedure:     Pelvic exam performed: yes      Cervix cleaned and prepped: yes      Speculum placed in vagina: yes      Tenaculum applied to cervix: Allis clamp        Uterus sounded: yes      Uterus sound depth (cm):  7 5    IUD inserted with no complications: yes      IUD type:  Mirena    Strings trimmed: yes    Post-procedure:     Patient tolerated procedure well: yes    Comments:      After removal of the previous Mirena IUD, the cervix was reached cleaned with Betadine solution  The anterior lip of the cervix was grasped with an Allis clamp and kept on gentle traction as the uterus was sounded without difficulty  The new Mirena was placed without difficulty and the strings were trimmed  The patient tolerated the procedure well, and will follow up as needed

## 2019-05-02 ENCOUNTER — OFFICE VISIT (OUTPATIENT)
Dept: FAMILY MEDICINE CLINIC | Facility: CLINIC | Age: 44
End: 2019-05-02
Payer: COMMERCIAL

## 2019-05-02 VITALS
HEIGHT: 62 IN | WEIGHT: 237 LBS | RESPIRATION RATE: 18 BRPM | TEMPERATURE: 99.4 F | DIASTOLIC BLOOD PRESSURE: 78 MMHG | BODY MASS INDEX: 43.61 KG/M2 | HEART RATE: 76 BPM | SYSTOLIC BLOOD PRESSURE: 128 MMHG

## 2019-05-02 DIAGNOSIS — H60.391: ICD-10-CM

## 2019-05-02 DIAGNOSIS — H61.21 IMPACTED CERUMEN OF RIGHT EAR: Primary | ICD-10-CM

## 2019-05-02 PROCEDURE — 69210 REMOVE IMPACTED EAR WAX UNI: CPT | Performed by: NURSE PRACTITIONER

## 2019-05-02 PROCEDURE — 99213 OFFICE O/P EST LOW 20 MIN: CPT | Performed by: NURSE PRACTITIONER

## 2019-06-03 ENCOUNTER — OFFICE VISIT (OUTPATIENT)
Dept: FAMILY MEDICINE CLINIC | Facility: CLINIC | Age: 44
End: 2019-06-03
Payer: COMMERCIAL

## 2019-06-03 VITALS
DIASTOLIC BLOOD PRESSURE: 94 MMHG | HEART RATE: 86 BPM | TEMPERATURE: 99.1 F | RESPIRATION RATE: 18 BRPM | WEIGHT: 240.6 LBS | SYSTOLIC BLOOD PRESSURE: 136 MMHG | HEIGHT: 62 IN | BODY MASS INDEX: 44.27 KG/M2

## 2019-06-03 DIAGNOSIS — Z13.0 SCREENING FOR DEFICIENCY ANEMIA: ICD-10-CM

## 2019-06-03 DIAGNOSIS — M53.3 SACROILIAC JOINT PAIN: Primary | ICD-10-CM

## 2019-06-03 DIAGNOSIS — Z13.6 SCREENING FOR CARDIOVASCULAR CONDITION: ICD-10-CM

## 2019-06-03 PROCEDURE — 1036F TOBACCO NON-USER: CPT | Performed by: FAMILY MEDICINE

## 2019-06-03 PROCEDURE — 3008F BODY MASS INDEX DOCD: CPT | Performed by: FAMILY MEDICINE

## 2019-06-03 PROCEDURE — 99213 OFFICE O/P EST LOW 20 MIN: CPT | Performed by: FAMILY MEDICINE

## 2019-06-03 RX ORDER — PREDNISONE 10 MG/1
TABLET ORAL
Qty: 40 TABLET | Refills: 0 | Status: SHIPPED | OUTPATIENT
Start: 2019-06-03 | End: 2019-06-04 | Stop reason: SDUPTHER

## 2019-06-04 ENCOUNTER — TELEPHONE (OUTPATIENT)
Dept: FAMILY MEDICINE CLINIC | Facility: CLINIC | Age: 44
End: 2019-06-04

## 2019-06-04 DIAGNOSIS — M53.3 SACROILIAC JOINT PAIN: ICD-10-CM

## 2019-06-04 RX ORDER — PREDNISONE 10 MG/1
TABLET ORAL
Qty: 40 TABLET | Refills: 0 | Status: SHIPPED | OUTPATIENT
Start: 2019-06-04 | End: 2019-10-14 | Stop reason: ALTCHOICE

## 2019-10-14 ENCOUNTER — OFFICE VISIT (OUTPATIENT)
Dept: FAMILY MEDICINE CLINIC | Facility: CLINIC | Age: 44
End: 2019-10-14
Payer: COMMERCIAL

## 2019-10-14 VITALS
WEIGHT: 235 LBS | HEIGHT: 62 IN | BODY MASS INDEX: 43.24 KG/M2 | TEMPERATURE: 99.3 F | RESPIRATION RATE: 16 BRPM | HEART RATE: 84 BPM | DIASTOLIC BLOOD PRESSURE: 76 MMHG | SYSTOLIC BLOOD PRESSURE: 114 MMHG

## 2019-10-14 DIAGNOSIS — J06.9 ACUTE UPPER RESPIRATORY INFECTION: Primary | ICD-10-CM

## 2019-10-14 PROCEDURE — 3008F BODY MASS INDEX DOCD: CPT | Performed by: NURSE PRACTITIONER

## 2019-10-14 PROCEDURE — 99213 OFFICE O/P EST LOW 20 MIN: CPT | Performed by: NURSE PRACTITIONER

## 2019-10-14 RX ORDER — FLUTICASONE PROPIONATE 50 MCG
2 SPRAY, SUSPENSION (ML) NASAL DAILY
Qty: 16 G | Refills: 3 | Status: SHIPPED | OUTPATIENT
Start: 2019-10-14

## 2019-10-14 NOTE — PROGRESS NOTES
Assessment/Plan:    1  Acute upper respiratory infection  -     fluticasone (FLONASE) 50 mcg/act nasal spray; 2 sprays into each nostril daily   Reviewed supportive care of viral illness as per pt instructions  RTO prn        BMI Counseling: Body mass index is 42 98 kg/m²  Discussed the patient's BMI with her  The BMI is above normal  Exercise recommendations include moderate aerobic physical activity for 150 minutes/week  Patient Instructions   Mucinex D every 12 hours   Saline nasal rinses   Start Flonase daily      Return if symptoms worsen or fail to improve  Subjective:      Patient ID: Mack Talley is a 44 y o  female  Chief Complaint   Patient presents with    Cold Like Symptoms     sinus congestion-getting worse-lj       For the past 4 days, she has had a sore throat, chills, sweats, sinus and nasal congestion with clear nasal drainage  She also reports post nasal drip and a cough  Her ears feel clogged  Denies chest tightness, SOB, or wheezing  She has been taking Robitussin DM which helps temporarily  The following portions of the patient's history were reviewed and updated as appropriate: allergies, current medications, past family history, past medical history, past social history, past surgical history and problem list     Review of Systems   Constitutional: Positive for chills, fatigue and fever  HENT: Positive for congestion, postnasal drip and sore throat  Negative for ear pain, rhinorrhea and sinus pressure  Respiratory: Positive for cough  Negative for shortness of breath and wheezing  Cardiovascular: Negative for chest pain  Gastrointestinal: Negative for abdominal pain, diarrhea, nausea and vomiting  Musculoskeletal: Negative for arthralgias  Skin: Negative for rash  Neurological: Positive for headaches           Current Outpatient Medications   Medication Sig Dispense Refill    albuterol (VENTOLIN HFA) 90 mcg/act inhaler Inhale 2 puffs every 4 (four) hours as needed for wheezing 18 g 0    escitalopram (LEXAPRO) 20 mg tablet Take 1 tablet by mouth daily      fluticasone (FLONASE) 50 mcg/act nasal spray 2 sprays into each nostril daily 16 g 3     No current facility-administered medications for this visit  Objective:    /76   Pulse 84   Temp 99 3 °F (37 4 °C)   Resp 16   Ht 5' 2" (1 575 m)   Wt 107 kg (235 lb)   BMI 42 98 kg/m²        Physical Exam   Constitutional: She appears well-developed and well-nourished  HENT:   Head: Normocephalic and atraumatic  Right Ear: Tympanic membrane, external ear and ear canal normal    Left Ear: Tympanic membrane, external ear and ear canal normal    Nose: No mucosal edema or rhinorrhea  Mouth/Throat: Uvula is midline, oropharynx is clear and moist and mucous membranes are normal    Eyes: Conjunctivae are normal    Neck: Neck supple  No edema present  No thyromegaly present  Cardiovascular: Normal rate, regular rhythm, normal heart sounds and intact distal pulses  No murmur heard  Pulmonary/Chest: Effort normal and breath sounds normal    Abdominal: Bowel sounds are normal  She exhibits no distension  There is no splenomegaly or hepatomegaly  There is no tenderness  Lymphadenopathy:        Right cervical: No superficial cervical adenopathy present  Left cervical: No superficial cervical adenopathy present  Skin: Skin is warm, dry and intact  No rash noted  Psychiatric: She has a normal mood and affect  Nursing note and vitals reviewed               Melissa Horan

## 2019-10-14 NOTE — LETTER
October 14, 2019     Patient: Francoise May   YOB: 1975   Date of Visit: 10/14/2019       To Whom it May Concern:    Francoise May is under my professional care  She was seen in my office on 10/14/2019  Please excuse from work 10/14/19 and 10/15/19  If you have any questions or concerns, please don't hesitate to call           Sincerely,          YESY Moreno        CC: No Recipients

## 2020-10-12 ENCOUNTER — OFFICE VISIT (OUTPATIENT)
Dept: FAMILY MEDICINE CLINIC | Facility: CLINIC | Age: 45
End: 2020-10-12
Payer: COMMERCIAL

## 2020-10-12 VITALS
HEIGHT: 62 IN | BODY MASS INDEX: 43.61 KG/M2 | DIASTOLIC BLOOD PRESSURE: 80 MMHG | WEIGHT: 237 LBS | TEMPERATURE: 96.9 F | HEART RATE: 74 BPM | SYSTOLIC BLOOD PRESSURE: 140 MMHG | OXYGEN SATURATION: 98 % | RESPIRATION RATE: 18 BRPM

## 2020-10-12 DIAGNOSIS — H61.21 IMPACTED CERUMEN OF RIGHT EAR: Primary | ICD-10-CM

## 2020-10-12 DIAGNOSIS — Z23 NEED FOR VACCINATION: ICD-10-CM

## 2020-10-12 PROCEDURE — 69210 REMOVE IMPACTED EAR WAX UNI: CPT | Performed by: NURSE PRACTITIONER

## 2020-10-12 PROCEDURE — 90686 IIV4 VACC NO PRSV 0.5 ML IM: CPT

## 2020-10-12 PROCEDURE — 90471 IMMUNIZATION ADMIN: CPT

## 2020-10-12 PROCEDURE — 99213 OFFICE O/P EST LOW 20 MIN: CPT | Performed by: NURSE PRACTITIONER

## 2020-12-30 ENCOUNTER — IMMUNIZATIONS (OUTPATIENT)
Dept: FAMILY MEDICINE CLINIC | Facility: HOSPITAL | Age: 45
End: 2020-12-30
Payer: COMMERCIAL

## 2020-12-30 DIAGNOSIS — Z23 ENCOUNTER FOR IMMUNIZATION: ICD-10-CM

## 2020-12-30 PROCEDURE — 91301 SARS-COV-2 / COVID-19 MRNA VACCINE (MODERNA) 100 MCG: CPT

## 2020-12-30 PROCEDURE — 0011A SARS-COV-2 / COVID-19 MRNA VACCINE (MODERNA) 100 MCG: CPT

## 2021-01-25 ENCOUNTER — IMMUNIZATIONS (OUTPATIENT)
Dept: FAMILY MEDICINE CLINIC | Facility: HOSPITAL | Age: 46
End: 2021-01-25

## 2021-01-25 DIAGNOSIS — Z23 ENCOUNTER FOR IMMUNIZATION: Primary | ICD-10-CM

## 2021-01-25 PROCEDURE — 0012A SARS-COV-2 / COVID-19 MRNA VACCINE (MODERNA) 100 MCG: CPT

## 2021-01-25 PROCEDURE — 91301 SARS-COV-2 / COVID-19 MRNA VACCINE (MODERNA) 100 MCG: CPT

## 2021-02-01 NOTE — PROGRESS NOTES
FAMILY PRACTICE HEALTH MAINTENANCE OFFICE VISIT  [facility]    NAME: Ashley Gallegos  AGE: 41 y o  SEX: female  : 1975     DATE: 2021    Assessment and Plan     1  Annual physical exam    2  Encounter for screening mammogram for breast cancer  -     Mammo screening bilateral w 3d & cad; Future; Expected date: 2021  -     US breast screening bilateral complete (ABUS); Future; Expected date: 2021    3  Morbid obesity ([address] )  Assessment & Plan:  She has lost 4 pounds       4  Anxiety  Assessment & Plan:  Not controlled  lexapro restarted at 10 mg if she needs the 20 mg dose she will let me know    Orders:  -     escitalopram (LEXAPRO) 10 mg tablet; Take 1 tablet (10 mg total) by mouth daily    5  Screening for cardiovascular condition  -     Comprehensive metabolic panel; Future  -     Lipid Panel with Direct LDL reflex; Future    6  Screening for deficiency anemia  -     CBC; Future    7  Encounter for long-term current use of medication  -     TSH, 3rd generation; Future    8  Dense breast  -     US breast screening bilateral complete (ABUS); Future; Expected date: 2021      · Patient Counseling:   · Nutrition: Stressed importance of a well balanced diet, moderation of sodium/saturated fat, caloric balance and sufficient intake of fiber  · Exercise: Stressed the importance of regular exercise with a goal of 150 minutes per week  · Dental Health: Discussed daily flossing and brushing and regular dental visits     · Immunizations reviewed: Up To Date  · Discussed benefits of:  Mammogram  and Screening labs   BMI Counseling: Body mass index is 43 39 kg/m²  Discussed with patient's BMI with her  The BMI is above normal  Exercise recommendations include exercising 3-5 times per week  Return in about 6 months (around 2021) for Next scheduled follow up          Chief Complaint     Chief Complaint   Patient presents with    Physical Exam     Marisabel Go History of Present Illness     Her anxiety 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  Well Adult Physical   Patient here for a comprehensive physical exam       Diet and Physical Activity  Diet: well balanced diet  Exercise: infrequently      Depression Screen  PHQ-9 Depression Screening    PHQ-9:   Frequency of the following problems over the past two weeks:      Little interest or pleasure in doing things: 0 - not at all  Feeling down, depressed, or hopeless: 1 - several days  PHQ-2 Score: 1          General Health  Hearing: left chronic hearing loss  Vision: wears glasses  Dental: regular dental visits    Reproductive Health  Follows with gynecologist      The following portions of the patient's history were reviewed and updated as appropriate: allergies, current medications, past family history, past medical history, past social history, past surgical history and problem list     Review of Systems     Review of Systems   Constitutional: Negative  Respiratory: Negative  Cardiovascular: Negative          Past Medical History     Past Medical History:   Diagnosis Date    Depression        Past Surgical History     Past Surgical History:   Procedure Laterality Date    BREAST BIOPSY Right     benign    CHOLECYSTECTOMY  02/19/2015    [employer]    FERTILITY SURGERY      [employer]       Social History     Social History     Socioeconomic History    Marital status: /Civil Union     Spouse name: None    Number of children: None    Years of education: None    Highest education level: None   Occupational History    None   Social Needs    Financial resource strain: None    Food insecurity     Worry: None     Inability: None    Transportation needs     Medical: None     Non-medical: None   Tobacco Use    Smoking status: Never Smoker    Smokeless tobacco: Never Used   Substance and Sexual Activity    Alcohol use: Yes     Frequency: 2-4 times a month     Drinks per session: 1 or 2 Binge frequency: Never    Drug use: No    Sexual activity: Yes     Partners: Male     Birth control/protection: I U D  Lifestyle    Physical activity     Days per week: None     Minutes per session: None    Stress: None   Relationships    Social connections     Talks on phone: None     Gets together: None     Attends Nondenominational service: None     Active member of club or organization: None     Attends meetings of clubs or organizations: None     Relationship status: None    Intimate partner violence     Fear of current or ex partner: None     Emotionally abused: None     Physically abused: None     Forced sexual activity: None   Other Topics Concern    None   Social History Narrative    None       Family History     Family History   Problem Relation Age of Onset    Vision loss Mother     Stroke Father     Asthma Sister     Melanoma Maternal Grandmother        Current Medications       Current Outpatient Medications:     albuterol (VENTOLIN HFA) 90 mcg/act inhaler, Inhale 2 puffs every 4 (four) hours as needed for wheezing, Disp: 18 g, Rfl: 0    fluticasone (FLONASE) 50 mcg/act nasal spray, 2 sprays into each nostril daily, Disp: 16 g, Rfl: 3    escitalopram (LEXAPRO) 10 mg tablet, Take 1 tablet (10 mg total) by mouth daily, Disp: 90 tablet, Rfl: 1     Allergies     No Known Allergies    Objective     /90   Pulse 92   Temp 98 4 °F (36 9 °C)   Resp 18   Ht 5' 1 5" (1 562 m)   Wt 106 kg (233 lb 6 4 oz)   SpO2 99%   BMI 43 39 kg/m²      Physical Exam  Vitals signs and nursing note reviewed  Constitutional:       Appearance: She is well-developed  HENT:      Head: Normocephalic and atraumatic  Right Ear: Tympanic membrane and external ear normal       Left Ear: Tympanic membrane and external ear normal    Eyes:      Pupils: Pupils are equal, round, and reactive to light  Neck:      Musculoskeletal: Normal range of motion     Cardiovascular:      Rate and Rhythm: Normal rate and regular rhythm  Heart sounds: Normal heart sounds  No murmur  No friction rub  Pulmonary:      Effort: Pulmonary effort is normal  No respiratory distress  Breath sounds: Normal breath sounds  No wheezing or rales  Abdominal:      General: Bowel sounds are normal  There is no distension  Palpations: Abdomen is soft  There is no mass  Tenderness: There is no abdominal tenderness  There is no guarding or rebound  Musculoskeletal:      Right lower leg: No edema  Left lower leg: No edema  Skin:     General: Skin is warm  Neurological:      Mental Status: She is alert and oriented to person, place, and time              Visual Acuity Screening    Right eye Left eye Both eyes   Without correction:      With correction: 20/50 20/25 20/25           Aimee Weinberg, [address]

## 2021-02-08 ENCOUNTER — OFFICE VISIT (OUTPATIENT)
Dept: FAMILY MEDICINE CLINIC | Facility: CLINIC | Age: 46
End: 2021-02-08
Payer: COMMERCIAL

## 2021-02-08 VITALS
WEIGHT: 233.4 LBS | OXYGEN SATURATION: 99 % | HEART RATE: 92 BPM | SYSTOLIC BLOOD PRESSURE: 136 MMHG | HEIGHT: 62 IN | RESPIRATION RATE: 18 BRPM | BODY MASS INDEX: 42.95 KG/M2 | TEMPERATURE: 98.4 F | DIASTOLIC BLOOD PRESSURE: 90 MMHG

## 2021-02-08 DIAGNOSIS — E66.01 MORBID OBESITY (HCC): ICD-10-CM

## 2021-02-08 DIAGNOSIS — Z12.31 ENCOUNTER FOR SCREENING MAMMOGRAM FOR BREAST CANCER: ICD-10-CM

## 2021-02-08 DIAGNOSIS — F41.9 ANXIETY: ICD-10-CM

## 2021-02-08 DIAGNOSIS — Z79.899 ENCOUNTER FOR LONG-TERM CURRENT USE OF MEDICATION: ICD-10-CM

## 2021-02-08 DIAGNOSIS — Z13.6 SCREENING FOR CARDIOVASCULAR CONDITION: ICD-10-CM

## 2021-02-08 DIAGNOSIS — R92.2 DENSE BREAST: ICD-10-CM

## 2021-02-08 DIAGNOSIS — Z00.00 ANNUAL PHYSICAL EXAM: Primary | ICD-10-CM

## 2021-02-08 DIAGNOSIS — Z13.0 SCREENING FOR DEFICIENCY ANEMIA: ICD-10-CM

## 2021-02-08 PROCEDURE — 99396 PREV VISIT EST AGE 40-64: CPT | Performed by: FAMILY MEDICINE

## 2021-02-08 RX ORDER — ESCITALOPRAM OXALATE 10 MG/1
10 TABLET ORAL DAILY
Qty: 90 TABLET | Refills: 1 | Status: SHIPPED | OUTPATIENT
Start: 2021-02-08 | End: 2021-11-10 | Stop reason: SDUPTHER

## 2021-03-17 ENCOUNTER — HOSPITAL ENCOUNTER (OUTPATIENT)
Dept: RADIOLOGY | Facility: HOSPITAL | Age: 46
Discharge: HOME | End: 2021-03-17
Payer: COMMERCIAL

## 2021-03-17 VITALS — WEIGHT: 233 LBS | BODY MASS INDEX: 43.99 KG/M2 | HEIGHT: 61 IN

## 2021-03-17 DIAGNOSIS — Z12.31 ENCOUNTER FOR SCREENING MAMMOGRAM FOR BREAST CANCER: ICD-10-CM

## 2021-03-17 DIAGNOSIS — R92.2 DENSE BREAST: ICD-10-CM

## 2021-03-17 PROCEDURE — 76642 ULTRASOUND BREAST LIMITED: CPT

## 2021-03-17 PROCEDURE — 77066 DX MAMMO INCL CAD BI: CPT

## 2021-03-17 PROCEDURE — G0279 TOMOSYNTHESIS, MAMMO: HCPCS

## 2021-07-22 ENCOUNTER — CLINICAL SUPPORT (OUTPATIENT)
Dept: BARIATRICS | Facility: CLINIC | Age: 46
End: 2021-07-22

## 2021-07-22 ENCOUNTER — PREP FOR PROCEDURE (OUTPATIENT)
Dept: BARIATRICS | Facility: CLINIC | Age: 46
End: 2021-07-22

## 2021-07-22 VITALS
DIASTOLIC BLOOD PRESSURE: 86 MMHG | WEIGHT: 241.2 LBS | HEART RATE: 63 BPM | HEIGHT: 62 IN | SYSTOLIC BLOOD PRESSURE: 122 MMHG | BODY MASS INDEX: 44.39 KG/M2

## 2021-07-22 DIAGNOSIS — Z98.84 BARIATRIC SURGERY STATUS: Primary | ICD-10-CM

## 2021-07-22 DIAGNOSIS — E66.01 MORBID (SEVERE) OBESITY DUE TO EXCESS CALORIES (HCC): Primary | ICD-10-CM

## 2021-07-22 PROCEDURE — RECHECK: Performed by: DIETITIAN, REGISTERED

## 2021-07-22 NOTE — PROGRESS NOTES
Bariatric Behavioral Health Evaluation    Presenting Problem: Cortez Esposito,  1975  Patient doesn't like the way she feels, low energy, doesn't like the way she looks and wants to avoid chronic health conditions  Patient has tried to be active with walking and swimming, calorie reduction, and Weight Watchers, she would lose some weight but then regain weight  Is the patient seeking Bariatric Surgery Eval? Yes  If yes how long have you researched this surgery option  Patient has been considering the surgery for several years, she had her children, started talking with a friend about the procedure and her experience which inspired her to move forward  Realizes Post- Op Requirements? Yes     Pre-morbid level of function and history of present illness: Patient has PCOS    Psychiatric/Psychological Treatment Diagnosis: Patient has a diagnosis of anxiety, she is taking medication and feels her symptoms are well managed  Patient denies any substance use disorder, will drink alcohol on rare social occasions, she is a non-smoker  Outpatient Counselor: No     Psychiatrist: Yes     Have you had Inpatient Treatment? Yes     Family Constellation (include relationship with each and Psych/Med HX)    Mother  tobacco use, Father  history of addiction and tobacco use, Siblings  obesity and Other  obesity, history of addiction and mental health illness    Domestic Violence No    Abuse History:  None    Social situations: linving with spouse and twin girls aged 15    Additional comments/stressors related to family/relationships/peer support: Patient struggles with weight and the stress it puts on their health, no other stressors  Patient's , cousin, coworker and sister know she's seeking surgery and everyone is supportive      Physical/Psychological Assessment:     Appearance: appropriate  Sociability: friendly  Affect: appropriate  Mood: calm  Thought Process: coherent  Speech: normal  Content: no impairment  Orientation: person  Yes , place  Yes , time  Yes , normal attention span  Yes , normal memory  Yes  , decreased in concentration ability  No and normal judgement  Yes   Insight: emotional  good    Risk Assessment:     none    Recommendations: Recommended for surgery  yes and Patient meets the criteria to be a member of [facility] Bariatric surgery program      Risk of Harm to Self or Others: Patient denies any SI/HI, no audio or visual hallucinations    Observation:     Access to weapons no     Based on the previous information, the client presents the following risk of harm to self or others: low    BARIATRIC SURGERY EDUCATION CHECKLIST    I have received education related to my bariatric surgery process and understand:    Patients may be required to complete a psychiatric evaluation and receive clearance for surgery from their psychiatrist     Patients who undergo weight loss surgery are at higher risk of increased mental health concerns and suicide attempts  Patients may be required to complete a full substance abuse evaluation and then complete all treatment recommendations prior to surgery  If diagnosis of abuse/dependence results, patient may be required to remain sober for one (1) year before having bariatric surgery  Patients on psychiatric medications should check with their provider to discuss psychiatric medications and the changes in absorption  Patient should discuss all time release medications with provider and take all medications as prescribed  The recommendation is that there is no use of  any tobacco products, Hookah or  vapes for the bariatric post-operation patient  Bariatric surgery patients should not consume alcohol as a post-operative patient as it may increase risk of numerous health conditions including but not limited to alcohol abuse and ulcers      There is a possibility of weight regain if patient does not follow all program guidelines and recommendations  Bariatric surgery patients should exercise thirty (30) to sixty (60) minutes per day to maintain post-surgical weight loss  Research indicates that bariatric patients are more successful when they see a therapist for up to two (2) years post-op  Patients will follow all medical and dietary recommendations provided  Patient will keep all scheduled appointments and follow up with their physician for a minimum of five (5) years  Patient will take all vitamins as recommended  Post-operative vitamins are life-long  Patient reviewed Bariatric Surgery Education Checklist and agrees they have received education on these issues   Note: Patient has a diagnosis of anxiety, her symptoms are well managed with current medication regiment that is prescribed by PCP  Patient denies any substance use disorder, she drinks alcohol on rare social occasions and is a non-smoker  Risk of alcohol and tobacco use post op were discussed  Impact of hormones on female reproduction post-op were discussed  Patient is not currently pregnant and doesn't plan to become pregnant within one year of surgery  Patient is appropriate for surgery and recommended to meet with surgeon    Sary Samuel LCSW

## 2021-07-22 NOTE — PROGRESS NOTES
Bariatric Nutrition Assessment Note    Type of surgery    Preop  Surgery Date: TBD  Surgeon: TBD    Nutrition Assessment   Anthony Craven  41 y o   female     Wt with BMI of 25:   Pre-Op Excess Wt: Ht 5' 1 75" (1 568 m)   Wt 109 kg (241 lb 3 2 oz)   BMI 44 47 kg/m²     [place]-   Anrda Equation:  1555  Estimated calories for weight loss 4648-7332 ( 1-2# per wk wt loss - sedentary )  Estimated protein needs 49-59(1 0-1 2 gms/kg IBW )   Estimated fluid needs 1319-9839 (30-35 ml/kg IBW )      Weight History   Onset of Obesity: Adult  Family history of obesity: Yes  Wt Loss Attempts: Commercial Programs ([employer], [employer], etc )  Exercise  Self Created Diets (Portion Control, Healthy Food Choices, etc )  Patient has tried the above for 6 months or more with insufficient weight loss or weight regain, which is why patient has requested to be evaluated for weight loss surgery today  Maximum Wt Lost: 30lbs      Review of History and Medications   Past Medical History:   Diagnosis Date    Depression      Past Surgical History:   Procedure Laterality Date    BREAST BIOPSY Right     benign    CHOLECYSTECTOMY  02/19/2015    Allscripts    FERTILITY SURGERY      Allscripts     Social History     Socioeconomic History    Marital status: /Civil Union     Spouse name: Not on file    Number of children: Not on file    Years of education: Not on file    Highest education level: Not on file   Occupational History    Not on file   Tobacco Use    Smoking status: Never Smoker    Smokeless tobacco: Never Used   Substance and Sexual Activity    Alcohol use: Yes    Drug use: No    Sexual activity: Yes     Partners: Male     Birth control/protection: I U D     Other Topics Concern    Not on file   Social History Narrative    Not on file     Social Determinants of Health     Financial Resource Strain:     Difficulty of Paying Living Expenses:    Food Insecurity:     Worried About Running Out of Food in the Last Year:    [address] in the Last Year:    Transportation Needs:     Lack of Transportation (Medical):      Lack of Transportation (Non-Medical):    Physical Activity:     Days of Exercise per Week:     Minutes of Exercise per Session:    Stress:     Feeling of Stress :    Social Connections:     Frequency of Communication with Friends and Family:     Frequency of Social Gatherings with Friends and Family:     Attends Protestant Services:     Active Member of Clubs or Organizations:     Attends Club or Organization Meetings:     Marital Status:    Intimate Partner Violence:     Fear of Current or Ex-Partner:     Emotionally Abused:     Physically Abused:     Sexually Abused:        Current Outpatient Medications:     albuterol (VENTOLIN HFA) 90 mcg/act inhaler, Inhale 2 puffs every 4 (four) hours as needed for wheezing, Disp: 18 g, Rfl: 0    escitalopram (LEXAPRO) 10 mg tablet, Take 1 tablet (10 mg total) by mouth daily, Disp: 90 tablet, Rfl: 1    fluticasone (FLONASE) 50 mcg/act nasal spray, 2 sprays into each nostril daily, Disp: 16 g, Rfl: 3   Reviewed pre-op vitamin and mineral supplementation    Food Intake and Lifestyle Assessment   Food Intake Assessment completed via usual diet recall  Wakes up: 6-7am  Bed: 9-9:30pm  -wakes up to urinate but able to fall back asleep  - works from home    7am Breakfast: 12 oz coffee (can last her 20 min to 4 hours) with cream and sugar  8:30-10a Snack: toast (rye bread) with dippy egg  11:30-12:30p Lunch: leftovers from night before or turkey and cheese on rye toast with brown mustard, pesto pasta salad  Snack: chips- time when stress eating starts  5-6p Dinner: hot dog, scalloped potatoes, watermelon  Snack: none, if does snack will choose dry cereal ie special K vanilla crunch)  Beverage intake: water, sweetened beverages, regular soda and coffee/tea  Protein supplement: none  Estimated protein intake per day: 40-60g  Estimated fluid intake per day: 12oz coffee, 16 oz soda, 8-10oz sweetened iced tea several times a drink, <16oz water daily  Meals eaten away from home: 2x/week (3x/week during the school year)- [employer], [employer], [employer], [employer]  Typical meal pattern: 2-3 meals per day and 0-1snacks per day  Eating Behaviors: Consumption of high calorie/ high fat foods, Consumption of high calorie beverages, Emotional eating and Craves salty foods  Food allergies or intolerances: No Known Allergies  Cultural or Tenriism considerations: none    Physical Assessment  Physical Activity  Types of exercise: housekeeping, activities of daily living  Current physical limitations: none    Psychosocial Assessment   Support systems: spouse parent(s) relative(s)  Socioeconomic factors: none disclosed  Identifies herself as a stress eater (often craves salty foods- ie chips)   does the grocery shopping, cooking responsibilities are shared  -states alcoholism does run in her family    Nutrition Diagnosis  Diagnosis: Overweight / Obesity (NC-3 3)  Related to: Physical inactivity and Excessive energy intake  As Evidenced by: BMI >25     Nutrition Prescription: Recommend the following diet  Low fat, Low sugar and High protein    Interventions and Teaching   Discussed pre-op and post-op nutrition guidelines  Patient educated and handouts provided    Surgical changes to stomach / GI  Capacity of post-surgery stomach  Diet progression  Adequate hydration  Sugar and fat restriction to decrease "dumping syndrome"  Expected weight loss  Weight loss plateaus/ possibility of weight regain  Exercise  Suggestions for pre-op diet  Nutrition considerations after surgery  Protein supplements  Meal planning and preparation  Appropriate carbohydrate, protein, and fat intake, and food/fluid choices to maximize safe weight loss, nutrient intake, and tolerance   Dietary and lifestyle changes  Possible problems with poor eating habits  Intuitive eating  Techniques for self monitoring and keeping daily food journal  Potential for food intolerance after surgery, and ways to deal with them including: lactose intolerance, nausea, reflux, vomiting, diarrhea, food intolerance, appetite changes, gas  Vitamin / Mineral supplementation of Multivitamin with minerals and Vitamin D    Patient is not currently pregnant and doesn't desire to become pregnant a minimum of one year post-op    Education provided to: patient    Barriers to learning: No barriers identified    Readiness to change: preparation    Prior research on procedure: internet and friends or family    Comprehension: verbalizes understanding     Expected Compliance: good  Recommendations  Pt is an appropriate candidate for surgery   Yes    Evaluation / Monitoring  Dietitian to Monitor: Eating pattern as discussed Tolerance of nutrition prescription Body weight Lab values Physical activity Bowel pattern    Workflow:   Psych and/or D+A Clearance:    Bloodwork: labs ordered by PCP, patient states she is going to get them drawn this weekend   PCP Letter: complete (2/8/2021)   Support Group: incomplete   Weight Loss: 5%=12#, GW= 229 2#; @216 5# BMI =40   Nicotine test: n/a (non smoker)   6 Month Pre-Operative Program:  No weight checks per insurance   EGD incomplete   Cardiac Risk Assessment: incomplete   Sleep Studies: incomplete    Goals  Eliminate sugar sweetened beverages, Food journal, Exercise 30 minutes 5 times per week, Complete lession plans 1-6, Eat 3 meals per day and Eliminate mindless snacking    Time Spent:   1 Hour

## 2021-07-23 ENCOUNTER — TELEPHONE (OUTPATIENT)
Dept: BARIATRICS | Facility: CLINIC | Age: 46
End: 2021-07-23

## 2021-07-31 ENCOUNTER — APPOINTMENT (OUTPATIENT)
Dept: LAB | Facility: HOSPITAL | Age: 46
End: 2021-07-31
Payer: COMMERCIAL

## 2021-07-31 DIAGNOSIS — Z13.0 SCREENING FOR DEFICIENCY ANEMIA: ICD-10-CM

## 2021-07-31 DIAGNOSIS — Z00.8 ENCOUNTER FOR OTHER GENERAL EXAMINATION: ICD-10-CM

## 2021-07-31 DIAGNOSIS — Z13.6 SCREENING FOR CARDIOVASCULAR CONDITION: ICD-10-CM

## 2021-07-31 DIAGNOSIS — Z79.899 ENCOUNTER FOR LONG-TERM CURRENT USE OF MEDICATION: ICD-10-CM

## 2021-07-31 LAB
ALBUMIN SERPL BCP-MCNC: 3.8 G/DL (ref 3.5–5)
ALP SERPL-CCNC: 76 U/L (ref 46–116)
ALT SERPL W P-5'-P-CCNC: 57 U/L (ref 12–78)
ANION GAP SERPL CALCULATED.3IONS-SCNC: 9 MMOL/L (ref 4–13)
AST SERPL W P-5'-P-CCNC: 30 U/L (ref 5–45)
BILIRUB SERPL-MCNC: 0.64 MG/DL (ref 0.2–1)
BUN SERPL-MCNC: 13 MG/DL (ref 5–25)
CALCIUM SERPL-MCNC: 8.8 MG/DL (ref 8.3–10.1)
CHLORIDE SERPL-SCNC: 105 MMOL/L (ref 100–108)
CHOLEST SERPL-MCNC: 264 MG/DL (ref 50–200)
CO2 SERPL-SCNC: 27 MMOL/L (ref 21–32)
CREAT SERPL-MCNC: 0.78 MG/DL (ref 0.6–1.3)
ERYTHROCYTE [DISTWIDTH] IN BLOOD BY AUTOMATED COUNT: 12.5 % (ref 11.6–15.1)
EST. AVERAGE GLUCOSE BLD GHB EST-MCNC: 105 MG/DL
GFR SERPL CREATININE-BSD FRML MDRD: 93 ML/MIN/1.73SQ M
GLUCOSE P FAST SERPL-MCNC: 91 MG/DL (ref 65–99)
HBA1C MFR BLD: 5.3 %
HCT VFR BLD AUTO: 46.2 % (ref 34.8–46.1)
HDLC SERPL-MCNC: 49 MG/DL
HGB BLD-MCNC: 14.8 G/DL (ref 11.5–15.4)
LDLC SERPL CALC-MCNC: 185 MG/DL (ref 0–100)
MCH RBC QN AUTO: 30.5 PG (ref 26.8–34.3)
MCHC RBC AUTO-ENTMCNC: 32 G/DL (ref 31.4–37.4)
MCV RBC AUTO: 95 FL (ref 82–98)
PLATELET # BLD AUTO: 328 THOUSANDS/UL (ref 149–390)
PMV BLD AUTO: 11.3 FL (ref 8.9–12.7)
POTASSIUM SERPL-SCNC: 4.4 MMOL/L (ref 3.5–5.3)
PROT SERPL-MCNC: 7.5 G/DL (ref 6.4–8.2)
RBC # BLD AUTO: 4.85 MILLION/UL (ref 3.81–5.12)
SODIUM SERPL-SCNC: 141 MMOL/L (ref 136–145)
TRIGL SERPL-MCNC: 152 MG/DL
TSH SERPL DL<=0.05 MIU/L-ACNC: 1.92 UIU/ML (ref 0.36–3.74)
WBC # BLD AUTO: 10.35 THOUSAND/UL (ref 4.31–10.16)

## 2021-07-31 PROCEDURE — 83036 HEMOGLOBIN GLYCOSYLATED A1C: CPT

## 2021-07-31 PROCEDURE — 85027 COMPLETE CBC AUTOMATED: CPT

## 2021-07-31 PROCEDURE — 84443 ASSAY THYROID STIM HORMONE: CPT

## 2021-07-31 PROCEDURE — 80053 COMPREHEN METABOLIC PANEL: CPT

## 2021-07-31 PROCEDURE — 80061 LIPID PANEL: CPT

## 2021-07-31 PROCEDURE — 36415 COLL VENOUS BLD VENIPUNCTURE: CPT

## 2021-08-04 ENCOUNTER — OFFICE VISIT (OUTPATIENT)
Dept: BARIATRICS | Facility: CLINIC | Age: 46
End: 2021-08-04
Payer: COMMERCIAL

## 2021-08-04 VITALS
HEIGHT: 62 IN | WEIGHT: 236.6 LBS | DIASTOLIC BLOOD PRESSURE: 90 MMHG | BODY MASS INDEX: 43.54 KG/M2 | SYSTOLIC BLOOD PRESSURE: 138 MMHG | HEART RATE: 72 BPM

## 2021-08-04 DIAGNOSIS — E28.2 POLYCYSTIC OVARIAN SYNDROME: ICD-10-CM

## 2021-08-04 DIAGNOSIS — E66.01 OBESITY, CLASS III, BMI 40-49.9 (MORBID OBESITY) (HCC): Primary | ICD-10-CM

## 2021-08-04 DIAGNOSIS — F41.9 ANXIETY: ICD-10-CM

## 2021-08-04 PROCEDURE — 99204 OFFICE O/P NEW MOD 45 MIN: CPT | Performed by: SURGERY

## 2021-08-04 RX ORDER — DIPHENOXYLATE HYDROCHLORIDE AND ATROPINE SULFATE 2.5; .025 MG/1; MG/1
1 TABLET ORAL DAILY
COMMUNITY

## 2021-08-04 RX ORDER — MELATONIN
2000 DAILY
COMMUNITY

## 2021-08-04 NOTE — LETTER
August 4, 2021     Sindhu Grubbs DO  [facility] [address] [address]    Patient: Cortney Gardner   YOB: 1975   Date of Visit: 8/4/2021       Dear Dr Katerine Forbes: Thank you for referring Cortney Gardner to me for evaluation for bariatric surgery  Below are my notes for this consultation  If you have questions, please do not hesitate to call me on my cell phone at [phone]  I look forward to following your patient along with you  Sincerely,      Ramesh Abbott MD, Tamar Rose, [facility]  Metabolic & Bariatric Surgery Director  [facility] - [place]   8/4/2021  2:37 PM      CC: No Recipients  Becky Pickering MD  8/4/2021  2:37 PM  Sign when Signing Visit      [address] [facility] [address] 41 y o  female MRN: [id number]  Unit/Bed#:  Encounter: [id number]      HPI:  Cortney Gardner is a very pleasant 41 y o  female who presents with a longstanding history of morbid obesity and inability to sustain a meaningful weight loss  Here today to discuss bariatric options  She is a  [employer] employee  Friends with my patient Nick Prince  Body mass index is 43 7 kg/m²  ++Suffers from PCOS, anxiety  S/p lap vijay, lipoma    Visit type: consultation     Symptoms: excess weight, weight increase, inability to loss weight, dysnea and fatigue    Associated Symptoms: anxiety    Associated Conditions: abdominal obesity  Disease Complications: infertility  Weight Loss Interest: high  Previous Diet Trials: low calorie     Exercise Frequency:infrequency  Types of Exercise: walking      Review of Systems   Constitutional: Negative  Respiratory: Negative  Cardiovascular: Negative  Gastrointestinal: Negative  Musculoskeletal: Negative  Neurological: Negative  All other systems reviewed and are negative  Historical Information   Past Medical History:   Diagnosis Date    Depression     Obesity (BMI 30-39  9)      Past Surgical History:   Procedure Laterality Date    BREAST BIOPSY Right     benign    CHOLECYSTECTOMY  02/19/2015    Allscripts    FERTILITY SURGERY      Allscripts     Social History   Social History     Substance and Sexual Activity   Alcohol Use Yes     Social History     Substance and Sexual Activity   Drug Use No     Social History     Tobacco Use   Smoking Status Never Smoker   Smokeless Tobacco Never Used     Family History:   Family History   Problem Relation Age of Onset    Vision loss Mother    Kiki Parker Breast cancer Mother 58    Stroke Father     Asthma Sister     Melanoma Maternal Grandmother     Hepatitis Maternal Aunt     No Known Problems Brother     Breast cancer Sister     Breast cancer Sister    Kiki Sicks Breast cancer Sister     Breast cancer Sister        Meds/Allergies   all medications and allergies reviewed  No Known Allergies    Objective       Current Vitals:   /90 (BP Location: Right arm, Patient Position: Sitting, Cuff Size: Large)   Pulse 72   Ht 5' 1 7" (1 567 m)   Wt 107 kg (236 lb 9 6 oz)   BMI 43 70 kg/m²       Physical Exam  Vitals reviewed  Constitutional:       Appearance: She is well-developed  HENT:      Head: Normocephalic  Eyes:      Extraocular Movements: Extraocular movements intact  Cardiovascular:      Rate and Rhythm: Normal rate  Pulmonary:      Effort: Pulmonary effort is normal    Abdominal:      General: There is no distension  Musculoskeletal:         General: Normal range of motion  Cervical back: Normal range of motion  Neurological:      Mental Status: She is alert and oriented to person, place, and time  Psychiatric:         Mood and Affect: Mood normal          Behavior: Behavior normal          Thought Content: Thought content normal          Judgment: Judgment normal          Lab Results: I have personally reviewed pertinent lab results  Imaging: I have personally reviewed pertinent reports      EKG, Pathology, and Other Studies: I have personally reviewed pertinent reports  Assessment/PLAN:    41 y o  yo female with a long standing h/o of obesity and inability to sustain any meaningful weight loss on her own despite several attempts  She is interested in the Laparoscopic ani-en-y gastric bypass  ++Suffers from PCOS, anxiety  S/p lap vijay, lipoma    I have explained our Enhanced Recovery After Bariatric Surgery (ERABS) protocol and benefits including preoperative, intraoperative and postoperative elements  As a part of her pre op evaluation, she will be referred to a cardiologist for risk stratification  She needs an EGD to evaluate the anatomy of her GI tract prior to the operation  I have spent over 45 minutes with her face to face in the office today discussing her options and details of the surgery  We have seen an animation of the surgery on the computer that illustrates how the operation is done and how the anatomy will be altered with the procedure  Over 50% of this was coordinating care  She was given the opportunity to ask questions and I have answered all of them  I have discussed and educated the patient with regards to the components of our multidisciplinary program and the importance of compliance and follow up in the post operative period  The patient was also instructed with regards to the importance of behavior modification, nutritional counseling, support meeting attendance and lifestyle changes that are important to ensure success  Although there is a great statistical chance of improvement or even resolution of most of her associated comorbidities, the results vary from patient to patient and they largely depend on her commitment and compliance       Leo Cardona MD, FACS, [facility]  8/4/2021  2:17 PM

## 2021-08-04 NOTE — PROGRESS NOTES
BARIATRIC INITIAL CONSULT - BARIATRIC SURGERY    Bernardo Rangel 41 y o  female MRN: [id number]  Unit/Bed#:  Encounter: [id number]      HPI:  Bernardo Rangel is a very pleasant 41 y o  female who presents with a longstanding history of morbid obesity and inability to sustain a meaningful weight loss  Here today to discuss bariatric options  She is a  [employer] employee  Friends with my patient Rod Marks  Body mass index is 43 7 kg/m²  ++Suffers from PCOS, anxiety  S/p lap vijay, lipoma    Visit type: consultation     Symptoms: excess weight, weight increase, inability to loss weight, dysnea and fatigue    Associated Symptoms: anxiety    Associated Conditions: abdominal obesity  Disease Complications: infertility  Weight Loss Interest: high  Previous Diet Trials: low calorie     Exercise Frequency:infrequency  Types of Exercise: walking      Review of Systems   Constitutional: Negative  Respiratory: Negative  Cardiovascular: Negative  Gastrointestinal: Negative  Musculoskeletal: Negative  Neurological: Negative  All other systems reviewed and are negative  Historical Information   Past Medical History:   Diagnosis Date    Depression     Obesity (BMI 30-39  9)      Past Surgical History:   Procedure Laterality Date    BREAST BIOPSY Right     benign    CHOLECYSTECTOMY  02/19/2015    Allscripts    FERTILITY SURGERY      Allscripts     Social History   Social History     Substance and Sexual Activity   Alcohol Use Yes     Social History     Substance and Sexual Activity   Drug Use No     Social History     Tobacco Use   Smoking Status Never Smoker   Smokeless Tobacco Never Used     Family History:   Family History   Problem Relation Age of Onset    Vision loss Mother    Jeanette Mendosa Breast cancer Mother 58    Stroke Father     Asthma Sister     Melanoma Maternal Grandmother     Hepatitis Maternal Aunt     No Known Problems Brother     Breast cancer Sister     Breast cancer Sister    Jeanette Mendosa Breast cancer Sister     Breast cancer Sister        Meds/Allergies   all medications and allergies reviewed  No Known Allergies    Objective       Current Vitals:   /90 (BP Location: Right arm, Patient Position: Sitting, Cuff Size: Large)   Pulse 72   Ht 5' 1 7" (1 567 m)   Wt 107 kg (236 lb 9 6 oz)   BMI 43 70 kg/m²       Physical Exam  Vitals reviewed  Constitutional:       Appearance: She is well-developed  HENT:      Head: Normocephalic  Eyes:      Extraocular Movements: Extraocular movements intact  Cardiovascular:      Rate and Rhythm: Normal rate  Pulmonary:      Effort: Pulmonary effort is normal    Abdominal:      General: There is no distension  Musculoskeletal:         General: Normal range of motion  Cervical back: Normal range of motion  Neurological:      Mental Status: She is alert and oriented to person, place, and time  Psychiatric:         Mood and Affect: Mood normal          Behavior: Behavior normal          Thought Content: Thought content normal          Judgment: Judgment normal          Lab Results: I have personally reviewed pertinent lab results  Imaging: I have personally reviewed pertinent reports  EKG, Pathology, and Other Studies: I have personally reviewed pertinent reports  Assessment/PLAN:    41 y o  yo female with a long standing h/o of obesity and inability to sustain any meaningful weight loss on her own despite several attempts  She is interested in the Laparoscopic ani-en-y gastric bypass  ++Suffers from PCOS, anxiety  S/p lap vijay, lipoma    I have explained our Enhanced Recovery After Bariatric Surgery (ERABS) protocol and benefits including preoperative, intraoperative and postoperative elements  As a part of her pre op evaluation, she will be referred to a cardiologist for risk stratification  She needs an EGD to evaluate the anatomy of her GI tract prior to the operation    I have spent over 45 minutes with her face to face in the office today discussing her options and details of the surgery  We have seen an animation of the surgery on the computer that illustrates how the operation is done and how the anatomy will be altered with the procedure  Over 50% of this was coordinating care  She was given the opportunity to ask questions and I have answered all of them  I have discussed and educated the patient with regards to the components of our multidisciplinary program and the importance of compliance and follow up in the post operative period  The patient was also instructed with regards to the importance of behavior modification, nutritional counseling, support meeting attendance and lifestyle changes that are important to ensure success  Although there is a great statistical chance of improvement or even resolution of most of her associated comorbidities, the results vary from patient to patient and they largely depend on her commitment and compliance       Brandi Uribe MD, FACS, [facility]  8/4/2021  2:17 PM

## 2021-08-09 ENCOUNTER — OFFICE VISIT (OUTPATIENT)
Dept: FAMILY MEDICINE CLINIC | Facility: CLINIC | Age: 46
End: 2021-08-09
Payer: COMMERCIAL

## 2021-08-09 VITALS
WEIGHT: 237.8 LBS | DIASTOLIC BLOOD PRESSURE: 92 MMHG | HEART RATE: 65 BPM | SYSTOLIC BLOOD PRESSURE: 128 MMHG | BODY MASS INDEX: 43.76 KG/M2 | HEIGHT: 62 IN | OXYGEN SATURATION: 98 % | TEMPERATURE: 100.8 F | RESPIRATION RATE: 18 BRPM

## 2021-08-09 DIAGNOSIS — R73.09 ABNORMAL GLUCOSE: ICD-10-CM

## 2021-08-09 DIAGNOSIS — E66.01 MORBID OBESITY (HCC): ICD-10-CM

## 2021-08-09 DIAGNOSIS — F41.9 ANXIETY: ICD-10-CM

## 2021-08-09 DIAGNOSIS — E78.2 MIXED HYPERLIPIDEMIA: Primary | ICD-10-CM

## 2021-08-09 PROCEDURE — 99214 OFFICE O/P EST MOD 30 MIN: CPT | Performed by: FAMILY MEDICINE

## 2021-08-09 NOTE — PROGRESS NOTES
Assessment/Plan:    1  Mixed hyperlipidemia  Assessment & Plan:  LDL is not at goal  Will work on diet and recheck in 6 months    Orders:  -     Comprehensive metabolic panel; Future; Expected date: 10/23/2021  -     Lipid Panel with Direct LDL reflex; Future; Expected date: 10/23/2021    2  Anxiety  Assessment & Plan:  Well controlled  Continue lexapro 10 mg a day      3  Abnormal glucose  Assessment & Plan:  Improved  Fasting sugar is down to normal       4  Morbid obesity ([address] )  Assessment & Plan:  Not controlled  She is working with Bariatric surgery            There are no Patient Instructions on file for this visit  Return in about 6 months (around 2/9/2022) for Annual physical     Subjective:      Patient ID: Dianna Ayers is a 41 y o  female  Chief Complaint   Patient presents with    Follow-up     6mo f/u  [place] [facility]       She reports that she is feeling well  She did have a sudden death in the family a few weeks ago and has been able to manage her mood  The lexapro is working well fo her  Obesity - she is working with Bariatric surgery to hopefully to get surgery in the fall  Hyperlipidemia - she has started watching her diet more carefully since seeing her lab work  The following portions of the patient's history were reviewed and updated as appropriate: allergies, current medications, past family history, past medical history, past social history, past surgical history and problem list     Review of Systems   Constitutional: Negative  Respiratory: Negative  Cardiovascular: Negative            Current Outpatient Medications   Medication Sig Dispense Refill    cholecalciferol (VITAMIN D3) 1,000 units tablet Take 2,000 Units by mouth daily      escitalopram (LEXAPRO) 10 mg tablet Take 1 tablet (10 mg total) by mouth daily 90 tablet 1    fluticasone (FLONASE) 50 mcg/act nasal spray 2 sprays into each nostril daily 16 g 3    levonorgestrel (MIRENA) 20 MCG/24HR IUD 1 each by Intrauterine route once      multivitamin (THERAGRAN) TABS Take 1 tablet by mouth daily       No current facility-administered medications for this visit  Objective:    /92   Pulse 65   Temp (!) 100 8 °F (38 2 °C)   Resp 18   Ht 5' 1 7" (1 567 m)   Wt 108 kg (237 lb 12 8 oz)   SpO2 98%   BMI 43 92 kg/m²        Physical Exam  Vitals and nursing note reviewed  Constitutional:       Appearance: She is well-developed  HENT:      Head: Normocephalic and atraumatic  Right Ear: Tympanic membrane and external ear normal       Left Ear: Tympanic membrane and external ear normal    Cardiovascular:      Rate and Rhythm: Normal rate and regular rhythm  Heart sounds: Normal heart sounds  No murmur heard  No friction rub  Pulmonary:      Effort: Pulmonary effort is normal  No respiratory distress  Breath sounds: Normal breath sounds  No wheezing or rales  Musculoskeletal:      Right lower leg: No edema  Left lower leg: No edema                  Jared Lemus, DO

## 2021-08-24 ENCOUNTER — CONSULT (OUTPATIENT)
Dept: CARDIOLOGY CLINIC | Facility: CLINIC | Age: 46
End: 2021-08-24
Payer: COMMERCIAL

## 2021-08-24 VITALS
WEIGHT: 235 LBS | OXYGEN SATURATION: 97 % | BODY MASS INDEX: 43.24 KG/M2 | SYSTOLIC BLOOD PRESSURE: 124 MMHG | DIASTOLIC BLOOD PRESSURE: 80 MMHG | HEIGHT: 62 IN | TEMPERATURE: 98.7 F | HEART RATE: 72 BPM

## 2021-08-24 DIAGNOSIS — E78.2 MIXED HYPERLIPIDEMIA: ICD-10-CM

## 2021-08-24 DIAGNOSIS — Z98.84 BARIATRIC SURGERY STATUS: ICD-10-CM

## 2021-08-24 DIAGNOSIS — Z01.810 PREOPERATIVE CARDIOVASCULAR EXAMINATION: Primary | ICD-10-CM

## 2021-08-24 PROCEDURE — 99243 OFF/OP CNSLTJ NEW/EST LOW 30: CPT | Performed by: INTERNAL MEDICINE

## 2021-08-24 NOTE — PROGRESS NOTES
Subjective:     Daryn Newberry is a 41 y o  female  who presents to the office today for a preoperative consultation at the request of surgeon Dr Lei Monreal who plans on performing gastric bypass with surgical date TBD  Planned anesthesia is general  The patient has no known anesthesia issues  Most recent surgery was  13 years ago without complications  she is able to ambulate 4 blocks on level ground or 2 flights of stairs without stopping   (>4 METs)  She denies any shortness of breath or chest pain with exertion  The following portions of the patient's history were reviewed and updated as appropriate: allergies, current medications, past family history, past medical history, past social history, past surgical history and problem list     Review of Systems  Review of Systems   Constitutional: Negative for fatigue  Respiratory: Negative for shortness of breath  Cardiovascular: Negative for chest pain, palpitations and leg swelling  All other systems reviewed and are negative  Objective:      Physical Exam  /80 (BP Location: Right arm, Patient Position: Sitting, Cuff Size: Large)   Pulse 72   Temp 98 7 °F (37 1 °C)   Ht 5' 1 7" (1 567 m)   Wt 107 kg (235 lb)   SpO2 97%   BMI 43 40 kg/m²    Physical Exam   Constitutional: She appears healthy  No distress  Eyes: Pupils are equal, round, and reactive to light  Conjunctivae are normal    Neck: No JVD present  Cardiovascular: Normal rate, regular rhythm and normal heart sounds  Exam reveals no gallop and no friction rub  No murmur heard  Pulmonary/Chest: Effort normal and breath sounds normal  She has no wheezes  She has no rales  Musculoskeletal:         General: No tenderness, deformity or edema  Cervical back: Normal range of motion and neck supple  Neurological: She is alert and oriented to person, place, and time  Skin: Skin is warm and dry          Cardiographics  ECG: normal sinus rhythm, no blocks or conduction defects, no ischemic changes    Lab Review   Lab Results   Component Value Date    K 4 4 07/31/2021     07/31/2021    CO2 27 07/31/2021    BUN 13 07/31/2021    CREATININE 0 78 07/31/2021    CALCIUM 8 8 07/31/2021     Lab Results   Component Value Date    WBC 10 35 (H) 07/31/2021    HGB 14 8 07/31/2021    HCT 46 2 (H) 07/31/2021    MCV 95 07/31/2021     07/31/2021     Lab Results   Component Value Date    CHOL 238 08/12/2015    TRIG 152 (H) 07/31/2021    TRIG 223 08/12/2015    HDL 49 07/31/2021    HDL 63 08/12/2015          Assessment:     1  Preoperative cardiovascular examination    2  Mixed hyperlipidemia    3  Bariatric surgery status           59 y o  female  with planned surgery as above  Known risk factors for perioperative complications: None        Cardiac Risk Estimation: per the Revised Cardiac Risk Index, the patient has a score of 0 placing her at low risk of major cardiac event (3 9%),  and may proceed to OR as planned  No further cardiac workup is indicated at this time  Plan:      1  Preoperative workup as follows none  2  Change in medication regimen before surgery: none, continue medication regimen including morning of surgery, with sip of water  3  Prophylaxis for cardiac events with perioperative beta-blockers: not indicated  4  Invasive hemodynamic monitoring perioperatively: at the discretion of anesthesiologist   5  Deep vein thrombosis prophylaxis postoperatively:regimen to be chosen by surgical team   6  Other measures: Call if any changes prior to surgical date  Recent LDL cholesterol was 184 mg/dL  Repeat ordered  May require pre procedural statin use

## 2021-09-01 ENCOUNTER — OFFICE VISIT (OUTPATIENT)
Dept: BARIATRICS | Facility: CLINIC | Age: 46
End: 2021-09-01

## 2021-09-01 VITALS — BODY MASS INDEX: 43.07 KG/M2 | WEIGHT: 233.2 LBS

## 2021-09-01 DIAGNOSIS — E66.01 OBESITY, CLASS III, BMI 40-49.9 (MORBID OBESITY) (HCC): Primary | ICD-10-CM

## 2021-09-01 PROCEDURE — RECHECK

## 2021-09-01 NOTE — PROGRESS NOTES
Patient presents for pre-op check in with a weight loss of 3 4lbs since last visit  Eating behaviors/food choices: Patient reports that she has focused on having three healthy meals a day, protein shakes for breakfast and on occasion another for lunch or dinner if she is busy  She has been tracking her meals and finds this tool to be really helpful in making healthy choices  She denies any snacking, if she does want to reach for food she makes an effort to have her water instead  She has decreased her caffeine intake and is enjoying water with flavoring  Activity/Exercise:  Patient is active taking care of her home, with the weather she hasn't been out walking much but she does plan to incorporate this more with cooler days  Sleep/Rest:  Patient reports some sleep disruption due to stress, some racing thoughts but she does use deep breathing exercises to calm  She usually does well staying asleep except for nights she gets up to use the bathroom  She will continue deep breathing and mindfulness to fall asleep if feeling anxious  Mental Health/Wellness:  Patient reports anxiety symptoms are still well controlled with her medication  She has some increased anxiety about her kids going back to school and how they will navigate tough decisions but this isn't anything she can't manage on her own  She denies any emotional eating and has supports needed to manage  Workflow review:  Patient got her cardiac risk assessment completed and her labs which RD will review and lauren off on workflow  Patient just has to get EGD done on 9/17 and maintain weight as not to fall below 216lbs  Goals:  Patient will continue healthy eating habits, three meals a day and tracking  She will work on increasing activity as weather becomes more bearable and be mindful of stress that impacts sleep or overall wellness  Next Appointment:  Patient will return for follow up visit on 9/28 with GUMARO

## 2021-09-14 ENCOUNTER — TELEPHONE (OUTPATIENT)
Dept: PREADMISSION TESTING | Facility: HOSPITAL | Age: 46
End: 2021-09-14

## 2021-09-14 VITALS — HEIGHT: 61 IN | WEIGHT: 233 LBS | BODY MASS INDEX: 43.99 KG/M2

## 2021-09-17 ENCOUNTER — HOSPITAL ENCOUNTER (OUTPATIENT)
Dept: GASTROENTEROLOGY | Facility: AMBULARY SURGERY CENTER | Age: 46
Setting detail: OUTPATIENT SURGERY
Discharge: HOME | End: 2021-09-17
Attending: SURGERY
Payer: COMMERCIAL

## 2021-09-17 ENCOUNTER — ANESTHESIA (OUTPATIENT)
Dept: GASTROENTEROLOGY | Facility: AMBULARY SURGERY CENTER | Age: 46
End: 2021-09-17

## 2021-09-17 ENCOUNTER — ANESTHESIA EVENT (OUTPATIENT)
Dept: GASTROENTEROLOGY | Facility: AMBULARY SURGERY CENTER | Age: 46
End: 2021-09-17

## 2021-09-17 VITALS
TEMPERATURE: 98 F | DIASTOLIC BLOOD PRESSURE: 69 MMHG | BODY MASS INDEX: 43.45 KG/M2 | WEIGHT: 230.13 LBS | OXYGEN SATURATION: 97 % | SYSTOLIC BLOOD PRESSURE: 129 MMHG | HEIGHT: 61 IN | RESPIRATION RATE: 16 BRPM | HEART RATE: 54 BPM

## 2021-09-17 DIAGNOSIS — E66.01 MORBID (SEVERE) OBESITY DUE TO EXCESS CALORIES (HCC): ICD-10-CM

## 2021-09-17 LAB
EXT PREGNANCY TEST URINE: NEGATIVE
EXT. CONTROL: NORMAL

## 2021-09-17 PROCEDURE — 88305 TISSUE EXAM BY PATHOLOGIST: CPT | Performed by: PATHOLOGY

## 2021-09-17 PROCEDURE — 81025 URINE PREGNANCY TEST: CPT | Performed by: ANESTHESIOLOGY

## 2021-09-17 PROCEDURE — 43239 EGD BIOPSY SINGLE/MULTIPLE: CPT | Performed by: SURGERY

## 2021-09-17 RX ORDER — LIDOCAINE HYDROCHLORIDE 10 MG/ML
INJECTION, SOLUTION EPIDURAL; INFILTRATION; INTRACAUDAL; PERINEURAL AS NEEDED
Status: DISCONTINUED | OUTPATIENT
Start: 2021-09-17 | End: 2021-09-17

## 2021-09-17 RX ORDER — PROPOFOL 10 MG/ML
INJECTION, EMULSION INTRAVENOUS AS NEEDED
Status: DISCONTINUED | OUTPATIENT
Start: 2021-09-17 | End: 2021-09-17

## 2021-09-17 RX ORDER — SODIUM CHLORIDE, SODIUM LACTATE, POTASSIUM CHLORIDE, CALCIUM CHLORIDE 600; 310; 30; 20 MG/100ML; MG/100ML; MG/100ML; MG/100ML
125 INJECTION, SOLUTION INTRAVENOUS CONTINUOUS
Status: DISCONTINUED | OUTPATIENT
Start: 2021-09-17 | End: 2021-09-21 | Stop reason: HOSPADM

## 2021-09-17 RX ADMIN — SODIUM CHLORIDE, SODIUM LACTATE, POTASSIUM CHLORIDE, AND CALCIUM CHLORIDE 125 ML/HR: .6; .31; .03; .02 INJECTION, SOLUTION INTRAVENOUS at 08:28

## 2021-09-17 RX ADMIN — PROPOFOL 150 MG: 10 INJECTION, EMULSION INTRAVENOUS at 08:31

## 2021-09-17 RX ADMIN — LIDOCAINE HYDROCHLORIDE 100 MG: 10 INJECTION, SOLUTION EPIDURAL; INFILTRATION; INTRACAUDAL; PERINEURAL at 08:31

## 2021-09-17 NOTE — ANESTHESIA PREPROCEDURE EVALUATION
Procedure:  EGD    Relevant Problems   CARDIO   (+) Mixed hyperlipidemia      NEURO/PSYCH   (+) Anxiety        Physical Exam    Airway    Mallampati score: II  TM Distance: >3 FB  Neck ROM: full     Dental   No notable dental hx     Cardiovascular  Cardiovascular exam normal    Pulmonary  Pulmonary exam normal     Other Findings        Anesthesia Plan  ASA Score- 2     Anesthesia Type- IV sedation with anesthesia with ASA Monitors  Additional Monitors:   Airway Plan:           Plan Factors-Exercise tolerance (METS): >4 METS  Chart reviewed  Imaging results reviewed  Existing labs reviewed  Patient summary reviewed  Patient is not a current smoker  Induction-     Postoperative Plan-     Informed Consent- Anesthetic plan and risks discussed with patient  I personally reviewed this patient with the CRNA  Discussed and agreed on the Anesthesia Plan with the CRNA  Wilfredo Gill

## 2021-09-17 NOTE — H&P
This is a 41 y o  female with a history of morbid obesity and Body mass index is 43 48 kg/m²  Here for an EGD to evaluate the anatomy of the GI tract and to rule out the presence of H  pylori  Physical Exam    /67   Pulse 57   Temp 98 °F (36 7 °C) (Tympanic)   Resp 18   Ht 5' 1" (1 549 m)   Wt 104 kg (230 lb 2 oz)   SpO2 99%   BMI 43 48 kg/m²    AAOx3  RRR  CTA B  Abdomen obese  Benign  A/P:    This is a 41 y o  female with a history of morbid obesity and Body mass index is 43 48 kg/m²       Will proceed with the EGD and biopsies        Silvia Lino MD  9/17/2021  8:25 AM

## 2021-09-17 NOTE — ANESTHESIA POSTPROCEDURE EVALUATION
Post-Op Assessment Note    CV Status:  Stable    Pain management: adequate     Mental Status:  Alert and awake   Hydration Status:  Euvolemic   PONV Controlled:  Controlled   Airway Patency:  Patent      Post Op Vitals Reviewed: Yes      Staff: CRNA         No complications documented      BP   125/75   Temp      Pulse  84   Resp   20   SpO2   99

## 2021-09-24 ENCOUNTER — APPOINTMENT (EMERGENCY)
Dept: RADIOLOGY | Facility: HOSPITAL | Age: 46
End: 2021-09-24
Payer: COMMERCIAL

## 2021-09-24 ENCOUNTER — HOSPITAL ENCOUNTER (EMERGENCY)
Facility: HOSPITAL | Age: 46
Discharge: HOME | End: 2021-09-24
Attending: EMERGENCY MEDICINE | Admitting: EMERGENCY MEDICINE
Payer: COMMERCIAL

## 2021-09-24 VITALS
OXYGEN SATURATION: 96 % | SYSTOLIC BLOOD PRESSURE: 173 MMHG | HEART RATE: 97 BPM | TEMPERATURE: 99.9 F | DIASTOLIC BLOOD PRESSURE: 89 MMHG | RESPIRATION RATE: 20 BRPM
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
## 2021-09-24 DIAGNOSIS — S93.409A ANKLE SPRAIN: ICD-10-CM

## 2021-09-24 DIAGNOSIS — M77.31 HEEL SPUR, RIGHT: ICD-10-CM

## 2021-09-24 PROCEDURE — 99284 EMERGENCY DEPT VISIT MOD MDM: CPT | Performed by: EMERGENCY MEDICINE

## 2021-09-24 PROCEDURE — 73630 X-RAY EXAM OF FOOT: CPT

## 2021-09-24 PROCEDURE — 73610 X-RAY EXAM OF ANKLE: CPT

## 2021-09-24 PROCEDURE — 99283 EMERGENCY DEPT VISIT LOW MDM: CPT

## 2021-09-24 RX ORDER — NAPROXEN 500 MG/1
500 TABLET ORAL ONCE
Status: COMPLETED | OUTPATIENT
Start: 2021-09-24 | End: 2021-09-24

## 2021-09-24 RX ORDER — NAPROXEN 500 MG/1
500 TABLET ORAL 2 TIMES DAILY WITH MEALS
Qty: 20 TABLET | Refills: 0 | Status: SHIPPED | OUTPATIENT
Start: 2021-09-24 | End: 2021-11-30 | Stop reason: HOSPADM

## 2021-09-24 RX ADMIN — NAPROXEN 500 MG: 500 TABLET ORAL at 15:19

## 2021-09-24 NOTE — ED PROVIDER NOTES
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 Taking?    cholecalciferol (VITAMIN D3) 1,000 units tablet  Self Yes No   Sig: Take 2,000 Units by mouth daily   escitalopram (LEXAPRO) 10 mg tablet  Self No No   Sig: Take 1 tablet (10 mg total) by mouth daily   fluticasone (FLONASE) 50 mcg/act nasal spray  Self No No   Si sprays into each nostril daily   Patient taking differently: 2 sprays into each nostril as needed    levonorgestrel (MIRENA) 20 MCG/24HR IUD  Self Yes No   Si each by Intrauterine route once   multivitamin (THERAGRAN) TABS  Self Yes No   Sig: Take 1 tablet by mouth daily      Facility-Administered Medications: None       Past Medical History:   Diagnosis Date    COVID-19 vaccine series completed     Depression     Hyperlipidemia     recent dx     Morbid obesity with BMI of 40 0-44 9, adult ([facility] [address] )     EGD in preparation for gastric bypass surgery    Wears glasses        Past Surgical History:   Procedure Laterality Date    BREAST BIOPSY Right     benign    CHOLECYSTECTOMY  2015    [employer]    FERTILITY SURGERY      LASER ABLATION 09/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    WISDOM TOOTH EXTRACTION         Family History   Problem Relation Age of Onset    Vision loss Mother     Breast cancer Mother 58    Stroke Father     Diabetes Father         type 2    Asthma Sister     Melanoma Maternal Grandmother     Hepatitis Maternal Aunt     No Known Problems Brother     ADD / ADHD Daughter      I have reviewed and agree with the history as documented  E-Cigarette/Vaping    E-Cigarette Use Never User      E-Cigarette/Vaping Substances     Social History     Tobacco Use    Smoking status: Never Smoker    Smokeless tobacco: Never Used   Vaping Use    Vaping Use: Never used   Substance Use Topics    Alcohol use: Yes     Comment: rarely    Drug use: No       Review of Systems   Constitutional: Negative for activity change, appetite change, chills and fever  HENT: Negative for congestion and ear pain  Eyes: Negative for pain and discharge  Respiratory: Negative for cough, chest tightness, shortness of breath, wheezing and stridor  Cardiovascular: Negative for chest pain and palpitations  Gastrointestinal: Negative for abdominal distention, abdominal pain, constipation, diarrhea and nausea  Endocrine: Negative for cold intolerance  Genitourinary: Negative for dysuria, frequency and urgency  Musculoskeletal: Positive for arthralgias  Negative for back pain  Skin: Negative for color change and rash  Allergic/Immunologic: Negative for environmental allergies and food allergies  Neurological: Negative for dizziness, weakness, numbness and headaches  Hematological: Negative for adenopathy  Psychiatric/Behavioral: Negative for agitation, behavioral problems and confusion  The patient is not nervous/anxious  All other systems reviewed and are negative  Physical Exam  Physical Exam  Vitals and nursing note reviewed  Constitutional:       Appearance: She is well-developed     HENT: Head: Normocephalic and atraumatic  Eyes:      Conjunctiva/sclera: Conjunctivae normal    Cardiovascular:      Rate and Rhythm: Normal rate and regular rhythm  Heart sounds: Normal heart sounds  Pulmonary:      Effort: Pulmonary effort is normal       Breath sounds: Normal breath sounds  Abdominal:      General: Bowel sounds are normal  There is no distension  Palpations: Abdomen is soft  Tenderness: There is no abdominal tenderness  Musculoskeletal:         General: Normal range of motion  Cervical back: Normal range of motion and neck supple  Comments: Pulses intact bilateral lower extremities  Tenderness to palpation noted to the lateral malleoli bilaterally  Tenderness to palpation also noted to the lateral aspect of bilateral feet  Range of motion of ankle is intact bilateral   No obvious ecchymosis, erythema, edema, or bony deformity noted examination of bilateral feet and ankle  Skin:     General: Skin is warm and dry  Neurological:      Mental Status: She is alert and oriented to person, place, and time  Psychiatric:         Behavior: Behavior normal          Thought Content: Thought content normal          Judgment: Judgment normal          Vital Signs  ED Triage Vitals [09/24/21 1404]   Temperature Pulse Respirations Blood Pressure SpO2   99 9 °F (37 7 °C) 97 20 (!) 173/89 96 %      Temp Source Heart Rate Source Patient Position - Orthostatic VS BP Location FiO2 (%)   Tympanic Monitor Sitting Right arm --      Pain Score       4           Vitals:    09/24/21 1404   BP: (!) 173/89   Pulse: 97   Patient Position - Orthostatic VS: Sitting         Visual Acuity      ED Medications  Medications   naproxen (NAPROSYN) tablet 500 mg (500 mg Oral Given 9/24/21 1519)       Diagnostic Studies  Results Reviewed     None                 XR foot 3+ views RIGHT   Final Result by Cesario Mo MD (09/24 5478)      Tiny plantar heel spur              Workstation performed: [id number]         XR ankle 3+ views RIGHT   Final Result by Shruthi Guillen MD (09/24 1541)      No acute osseous abnormality  Workstation performed: [id number]         XR ankle 3+ views LEFT   Final Result by Shruthi Guillen MD (09/24 1540)      No acute osseous abnormality  Workstation performed: [id number]         XR foot 3+ views LEFT   Final Result by Shruthi Guillen MD (09/24 1540)      No acute osseous abnormality  Workstation performed: [id number]                    Procedures  Procedures         ED Course                                           MDM  Number of Diagnoses or Management Options  Ankle sprain: new and requires workup  Fall: new and requires workup  Heel spur, right: new and requires workup  Diagnosis management comments: Given naproxen for pain  Obtain x-ray of bilateral feet and ankle to rule out any acute bony abnormalities  Amount and/or Complexity of Data Reviewed  Tests in the medicine section of CPT®: ordered and reviewed  Review and summarize past medical records: yes  Independent visualization of images, tracings, or specimens: yes    Risk of Complications, Morbidity, and/or Mortality  General comments: No acute bony abnormalities noted on x-rays  There was a heel spur noted 2 on the right side  At this time patient's symptoms are consistent with musculoskeletal sprain/strain  Ace wrap and air cast applied to bilateral feet and ankle  Patient discharged home on naproxen and follow up to podiatry as needed  Close return instructions given to return to the ER for any worsening symptoms  Patient agrees with discharge plan  Patient well appearing at time of discharge  Please Note: Fluency Direct voice recognition software may have been used in the creation of this document  Wrong words or sound a like substitutions may have occurred due to the inherent limitations of the voice software         Patient Progress  Patient progress: stable      Disposition  Final diagnoses:   Fall   Ankle sprain   Heel spur, right     Time reflects when diagnosis was documented in both MDM as applicable and the Disposition within this note     Time User Action Codes Description Comment    9/24/2021  4:08 PM Fartun Martinezt Add [W19  XXXA] Fall     9/24/2021  4:09 PM Fartun Collet Add [X21 286W] Ankle sprain     9/24/2021  4:09 PM Fartun Collet Add [M77 31] Heel spur, right       ED Disposition     ED Disposition Condition Date/Time Comment    Discharge Stable Fri Sep 24, 2021  4:08 PM Huy Woody discharge to home/self care  Follow-up Information     Follow up With Specialties Details Why Contact Info    Wang Crum DPM Podiatry Schedule an appointment as soon as possible for a visit  If symptoms worsen [address] [facility], [address] [place] [address]  As needed [address] [facility]  [phone]            Patient's Medications   Discharge Prescriptions    NAPROXEN (NAPROSYN) 500 MG TABLET    Take 1 tablet (500 mg total) by mouth 2 (two) times a day with meals       Start Date: 9/24/2021 End Date: --       Order Dose: 500 mg       Quantity: 20 tablet    Refills: 0     No discharge procedures on file      PDMP Review     None          ED Provider  Electronically Signed by           Onelia Tavera DO  09/24/21 1922

## 2021-09-27 ENCOUNTER — OFFICE VISIT (OUTPATIENT)
Dept: FAMILY MEDICINE CLINIC | Facility: CLINIC | Age: 46
End: 2021-09-27
Payer: COMMERCIAL

## 2021-09-27 VITALS
HEIGHT: 61 IN | BODY MASS INDEX: 43.61 KG/M2 | TEMPERATURE: 97.2 F | OXYGEN SATURATION: 98 % | DIASTOLIC BLOOD PRESSURE: 84 MMHG | WEIGHT: 231 LBS | HEART RATE: 56 BPM | RESPIRATION RATE: 20 BRPM | SYSTOLIC BLOOD PRESSURE: 128 MMHG

## 2021-09-27 DIAGNOSIS — M25.571 ACUTE BILATERAL ANKLE PAIN: Primary | ICD-10-CM

## 2021-09-27 DIAGNOSIS — F41.9 ANXIETY: ICD-10-CM

## 2021-09-27 DIAGNOSIS — S90.00XA CONTUSION OF ANKLE, UNSPECIFIED LATERALITY, INITIAL ENCOUNTER: ICD-10-CM

## 2021-09-27 DIAGNOSIS — M25.562 ACUTE PAIN OF LEFT KNEE: ICD-10-CM

## 2021-09-27 DIAGNOSIS — M77.31 HEEL SPUR, RIGHT: ICD-10-CM

## 2021-09-27 DIAGNOSIS — M25.572 ACUTE BILATERAL ANKLE PAIN: Primary | ICD-10-CM

## 2021-09-27 DIAGNOSIS — E66.01 MORBID OBESITY (HCC): ICD-10-CM

## 2021-09-27 PROCEDURE — 99214 OFFICE O/P EST MOD 30 MIN: CPT | Performed by: NURSE PRACTITIONER

## 2021-09-27 RX ORDER — CYCLOBENZAPRINE HCL 10 MG
10 TABLET ORAL
Qty: 20 TABLET | Refills: 0 | Status: SHIPPED | OUTPATIENT
Start: 2021-09-27 | End: 2021-11-30 | Stop reason: HOSPADM

## 2021-09-27 RX ORDER — PREDNISONE 10 MG/1
TABLET ORAL
Qty: 30 TABLET | Refills: 0 | Status: SHIPPED | OUTPATIENT
Start: 2021-09-27 | End: 2021-11-30 | Stop reason: HOSPADM

## 2021-09-27 NOTE — PATIENT INSTRUCTIONS
R  I C E  Treatment   WHAT YOU NEED TO KNOW:   R I C E  treatment is a 4-step process used to decrease swelling and pain caused by an injury  R I C E  stands for rest, ice, compression, and elevation  R I C E  should be done within 24 to 48 hours after an injury  DISCHARGE INSTRUCTIONS:   Return to the emergency department if:   · Your pain is severe  · You have severe swelling or deformity  · You have numbness in the injured area  Contact your healthcare provider if:   · Your pain and swelling does not go away after a few days  · You have questions or concerns about your condition or care  How to use R I C E  treatment:   · Rest  your injured area as directed  You may need to stop using, or keep weight off, the injury for 48 hours or longer  Your healthcare provider may recommend crutches or another device  Return to your usual activities as directed  · Apply ice  on your injured area for 15 to 20 minutes every 4 hours or as directed  Use an ice pack, or put crushed ice in a plastic bag  Cover it with a towel  Ice helps prevent tissue damage and decreases swelling and pain  · Compress , or keep pressure on, the injured area  Compression will help decrease swelling and support the injured area  Use an elastic bandage, air stirrup, splint, or sling as directed  If you use an elastic bandage to wrap your injured area, make sure the bandage is not too tight  · Elevate  the injured area above the level of your heart as often as you can  This will help decrease swelling and pain  Prop the injured area on pillows or blankets to keep it elevated comfortably  Follow up with your healthcare provider as directed:  Write down your questions so you remember to ask them during your visits  © Copyright [employer] 2021 Information is for End User's use only and may not be sold, redistributed or otherwise used for commercial purposes   All illustrations and images included in CareNotes® are the copyrighted property of A D A M , Inc  or [facility][note 3] Sonja Mathew   The above information is an  only  It is not intended as medical advice for individual conditions or treatments  Talk to your doctor, nurse or pharmacist before following any medical regimen to see if it is safe and effective for you

## 2021-09-27 NOTE — PROGRESS NOTES
Assessment/Plan:    1  Acute bilateral ankle pain  Comments:  will continue with RICE with prednisone and muscle relaxant  Orders:  -     predniSONE 10 mg tablet; Take 4 pills/d for 3 days then 3 pills/d for 3 days, then 2 pills/d for 3 days then 1 pill/d for 3 days then stop  -     cyclobenzaprine (FLEXERIL) 10 mg tablet; Take 1 tablet (10 mg total) by mouth daily at bedtime for 20 days    2  Acute pain of left knee  -     predniSONE 10 mg tablet; Take 4 pills/d for 3 days then 3 pills/d for 3 days, then 2 pills/d for 3 days then 1 pill/d for 3 days then stop  -     cyclobenzaprine (FLEXERIL) 10 mg tablet; Take 1 tablet (10 mg total) by mouth daily at bedtime for 20 days    3  Heel spur, right    4  Contusion of ankle, unspecified laterality, initial encounter    5  Anxiety  Comments:  stable with current regimen    6  Morbid obesity ([facility] [address] )          BMI Counseling: Body mass index is 43 65 kg/m²  Discussed the patient's BMI with her  The BMI is above normal  Nutrition recommendations include reducing portion sizes, decreasing overall calorie intake, 3-5 servings of fruits/vegetables daily, reducing fast food intake, consuming healthier snacks and decreasing soda and/or juice intake  Patient Instructions: Take prednisone with food in morning and do not take any NSAID's while taking prednisone  Do not drive and operate any machinery after taking muscle relaxant  Return if symptoms worsen or fail to improve  Future Appointments   Date Time Provider Lauren Guadarrama   9/28/2021  8:30 AM Brent Molina RD [facility]   2/14/2022  5:00 PM DO EDGAR [facility]           Subjective:      Patient ID: Jaren Barroso is a 41 y o  female      Chief Complaint   Patient presents with    Follow-up     [facility] ER fell 3 days ago  [id number]         Vitals:  /84   Pulse 56   Temp (!) 97 2 °F (36 2 °C)   Resp 20   Ht 5' 1" (1 549 m)   Wt 105 kg (231 lb)   LMP  (LMP Unknown)   SpO2 98%   BMI 43 65 kg/m²     HPI  Patient was seen in ER on 9/24/2021 as fell coming out of her kid' school as missed concrete step  Stated that hurted her both ankles went to ER  xrays of both ankles done and reports reviewed and did not show any dislocation and fracture and right sided showed heel spur  Stated that now having some discomfort behind left knee  Ankles getting swollen at times and has bruising  Taking naprosyn which did not help much  Left ankle pain is worse than right  Takes lexapro for anxiety  PHQ-9 Depression Screening    PHQ-9:   Frequency of the following problems over the past two weeks:      Little interest or pleasure in doing things: 0 - not at all  Feeling down, depressed, or hopeless: 0 - not at all  PHQ-2 Score: 0             The following portions of the patient's history were reviewed and updated as appropriate: allergies, current medications, past family history, past medical history, past social history, past surgical history and problem list       Review of Systems   Constitutional: Positive for activity change  HENT: Negative  Respiratory: Negative  Cardiovascular: Negative  Gastrointestinal: Negative  Genitourinary: Negative for difficulty urinating  Musculoskeletal: Positive for arthralgias and joint swelling  As noted in HPI     Skin: Positive for rash  As noted in HPI     Neurological: Negative for dizziness           Objective:    Social History     Tobacco Use   Smoking Status Never Smoker   Smokeless Tobacco Never Used       Allergies: No Known Allergies      Current Outpatient Medications   Medication Sig Dispense Refill    cholecalciferol (VITAMIN D3) 1,000 units tablet Take 2,000 Units by mouth daily      escitalopram (LEXAPRO) 10 mg tablet Take 1 tablet (10 mg total) by mouth daily 90 tablet 1    fluticasone (FLONASE) 50 mcg/act nasal spray 2 sprays into each nostril daily (Patient taking differently: 2 sprays into each nostril as needed ) 16 g 3    levonorgestrel (MIRENA) 20 MCG/24HR IUD 1 each by Intrauterine route once      multivitamin (THERAGRAN) TABS Take 1 tablet by mouth daily      naproxen (NAPROSYN) 500 mg tablet Take 1 tablet (500 mg total) by mouth 2 (two) times a day with meals 20 tablet 0    cyclobenzaprine (FLEXERIL) 10 mg tablet Take 1 tablet (10 mg total) by mouth daily at bedtime for 20 days 20 tablet 0    predniSONE 10 mg tablet Take 4 pills/d for 3 days then 3 pills/d for 3 days, then 2 pills/d for 3 days then 1 pill/d for 3 days then stop 30 tablet 0     No current facility-administered medications for this visit  Physical Exam  Constitutional:       Appearance: Normal appearance  HENT:      Head: Normocephalic and atraumatic  Nose: Nose normal    Eyes:      Conjunctiva/sclera: Conjunctivae normal    Cardiovascular:      Rate and Rhythm: Normal rate and regular rhythm  Pulses: Normal pulses  Heart sounds: Normal heart sounds  Pulmonary:      Effort: Pulmonary effort is normal       Breath sounds: Normal breath sounds  Musculoskeletal:         General: Swelling and tenderness present  Right ankle: Swelling present  Tenderness present  Left ankle: Swelling present  Tenderness present  Legs:       Comments: Bilateral ankles bruising noted and trace edema noted on bilateral feet and ankle area  Tenderness noted on left foot close to 5th metatarsal bone  Ankle pain with ROM but able to put weight on and has ACE wraps on bilateral ankles   Skin:     General: Skin is warm and dry  Findings: No rash  Neurological:      Mental Status: She is alert and oriented to person, place, and time  Psychiatric:         Mood and Affect: Mood normal          Behavior: Behavior normal          Thought Content:  Thought content normal          Judgment: Judgment normal                      YESY Lyons

## 2021-09-28 ENCOUNTER — OFFICE VISIT (OUTPATIENT)
Dept: BARIATRICS | Facility: CLINIC | Age: 46
End: 2021-09-28

## 2021-09-28 VITALS — HEIGHT: 61 IN | WEIGHT: 229.6 LBS | BODY MASS INDEX: 43.35 KG/M2

## 2021-09-28 DIAGNOSIS — E66.01 MORBID (SEVERE) OBESITY DUE TO EXCESS CALORIES (HCC): Primary | ICD-10-CM

## 2021-09-28 PROCEDURE — RECHECK

## 2021-09-28 NOTE — PROGRESS NOTES
Bariatric Nutrition Follow-up Note    Type of surgery    Preop  Surgery Date: TBD--Pt submitted to insurance on 9/22/21  Surgeon: Dr Ayesha Strauss  41 y o   female     Wt with BMI of 25:   Pre-Op Excess Wt:    Ht 5' 1" (1 549 m)   Wt 104 kg (229 lb 9 6 oz)   LMP  (LMP Unknown)   BMI 43 38 kg/m²      Start weight:  241 2lbs  Net weight change:  -11 7lbs  5%=12#, [place]=628 2#  @216 5# BMI=40     [place]  [place] Equation:  1555  Estimated calories for weight loss 9004-7802(1-2# per wk wt loss-sedentary )  Estimated protein needs 49-59(1 0-1 2 gms/kg IBW )   Estimated fluid needs 1336-2028 (30-35 ml/kg IBW )      Weight History   Onset of Obesity: Adult  Family history of obesity: Yes  Wt Loss Attempts: Commercial Programs ([employer], [employer], etc )  Exercise  Self Created Diets (Portion Control, Healthy Food Choices, etc )  Patient has tried the above for 6 months or more with insufficient weight loss or weight regain, which is why patient has requested to be evaluated for weight loss surgery today  Maximum Wt Lost: 30lbs      Review of History and Medications   Past Medical History:   Diagnosis Date    COVID-19 vaccine series completed     Depression     Hyperlipidemia     recent dx 8/21    Morbid obesity with BMI of 40 0-44 9, adult ([facility] [address] )     EGD in preparation for gastric bypass surgery    Wears glasses      Past Surgical History:   Procedure Laterality Date    BREAST BIOPSY Right     benign    CHOLECYSTECTOMY  02/19/2015    [employer]    FERTILITY SURGERY      LASER ABLATION  09/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    WISDOM TOOTH EXTRACTION       Social History     Socioeconomic History    Marital status: /Civil Union     Spouse name: Not on file    Number of children: Not on file    Years of education: Not on file    Highest education level: Not on file   Occupational History    Not on file   Tobacco Use    Smoking status: Never Smoker    Smokeless tobacco: Never Used   Vaping Use    Vaping Use: Never used   Substance and Sexual Activity    Alcohol use: Yes     Comment: rarely    Drug use: No    Sexual activity: Yes     Partners: Male     Birth control/protection: I U D  Comment: mirena   Other Topics Concern    Not on file   Social History Narrative    Not on file     Social Determinants of Health     Financial Resource Strain:     Difficulty of Paying Living Expenses:    Food Insecurity:     Worried About Running Out of Food in the Last Year:     [address] in the Last Year:    Transportation Needs:     Lack of Transportation (Medical):      Lack of Transportation (Non-Medical):    Physical Activity:     Days of Exercise per Week:     Minutes of Exercise per Session:    Stress:     Feeling of Stress :    Social Connections:     Frequency of Communication with Friends and Family:     Frequency of Social Gatherings with Friends and Family:     Attends Jain Services:     Active Member of Clubs or Organizations:     Attends Club or Organization Meetings:     Marital Status:    Intimate Partner Violence:     Fear of Current or Ex-Partner:     Emotionally Abused:     Physically Abused:     Sexually Abused:        Current Outpatient Medications:     cholecalciferol (VITAMIN D3) 1,000 units tablet, Take 2,000 Units by mouth daily, Disp: , Rfl:     cyclobenzaprine (FLEXERIL) 10 mg tablet, Take 1 tablet (10 mg total) by mouth daily at bedtime for 20 days, Disp: 20 tablet, Rfl: 0    escitalopram (LEXAPRO) 10 mg tablet, Take 1 tablet (10 mg total) by mouth daily, Disp: 90 tablet, Rfl: 1    fluticasone (FLONASE) 50 mcg/act nasal spray, 2 sprays into each nostril daily (Patient taking differently: 2 sprays into each nostril as needed ), Disp: 16 g, Rfl: 3    levonorgestrel (MIRENA) 20 MCG/24HR IUD, 1 each by Intrauterine route once, Disp: , Rfl:     multivitamin (THERAGRAN) TABS, Take 1 tablet by mouth daily, Disp: , Rfl:     naproxen (NAPROSYN) 500 mg tablet, Take 1 tablet (500 mg total) by mouth 2 (two) times a day with meals, Disp: 20 tablet, Rfl: 0    predniSONE 10 mg tablet, Take 4 pills/d for 3 days then 3 pills/d for 3 days, then 2 pills/d for 3 days then 1 pill/d for 3 days then stop, Disp: 30 tablet, Rfl: 0   Reviewed pre-op vitamin and mineral supplementation    Food Intake and Lifestyle Assessment   Food Intake Assessment completed via usual diet recall--updates in bold  Wakes up: 6-7am  Bed: 9-9:30pm  -wakes up to urinate but able to fall back asleep  - works from home    Use the protein (premier) shake for 1-2 shakes per day    7am Breakfast: 12 oz coffee (down to 4oz per day) with cream and sugar--most often the premier  8:30-10a Snack: toast (rye bread) with dippy egg  11:30-12:30p Lunch: leftovers from night before or turkey and cheese on rye toast with brown mustard, pesto pasta salad  Sometimes another protein shake  Snack: chips- time when stress eating starts--now greek yogurt and some granola  5-6p Dinner: hot dog, scalloped potatoes, watermelon--now lean protein (chicken/fish), green veggies, not often carbs  Tried veggie spirals    Snack: none, if does snack will choose dry cereal ie special K vanilla crunch)--None  Beverage intake: water, sweetened beverages, regular soda and coffee/tea  Protein supplement: none  Estimated protein intake per day: 40-60g  Estimated fluid intake per day: 12oz coffee, 16 oz soda--very rare, 8-10oz sweetened iced tea several times a drink (now unsweet tea), <16oz water daily--Now up to almost 64oz (had water bottle with her at the visit today)  Meals eaten away from home: 2x/week (3x/week during the school year)- [employer], [employer], [employer], [employer]  Typical meal pattern: 2-3 meals per day and 0-1snacks per day  Eating Behaviors: Consumption of high calorie/ high fat foods, Consumption of high calorie beverages, Emotional eating and Craves salty foods  Food allergies or intolerances: No Known Allergies  Cultural or Jain considerations: none    Physical Assessment  Physical Activity  Types of exercise: housekeeping, activities of daily living  Current physical limitations: fell on Friday and sprained her ankles and walking slow  Psychosocial Assessment   Support systems: spouse parent(s) relative(s)  Socioeconomic factors: none disclosed  Identifies herself as a stress eater (often craves salty foods- ie chips)   does the grocery shopping, cooking responsibilities are shared  -states alcoholism does run in her family    Nutrition Diagnosis  Diagnosis: Overweight / Obesity (NC-3 3)  Related to: Physical inactivity and Excessive energy intake  As Evidenced by: BMI >25     Nutrition Prescription: Recommend the following diet  Low fat, Low sugar and High protein    Interventions and Teaching   Discussed pre-op and post-op nutrition guidelines  Patient educated and handouts provided    Surgical changes to stomach / GI  Capacity of post-surgery stomach  Diet progression  Adequate hydration  Expected weight loss  Weight loss plateaus/ possibility of weight regain  Exercise  Suggestions for pre-op diet  Nutrition considerations after surgery  Protein supplements  Meal planning and preparation  Appropriate carbohydrate, protein, and fat intake, and food/fluid choices to maximize safe weight loss, nutrient intake, and tolerance   Dietary and lifestyle changes  Possible problems with poor eating habits  Intuitive eating  Techniques for self monitoring and keeping daily food journal  Vitamin / Mineral supplementation of Multivitamin with minerals and Vitamin D--today discussed post-op vitamins and provided with samples    Patient is not currently pregnant and doesn't desire to become pregnant a minimum of one year post-op    Education provided to: patient    Barriers to learning: No barriers identified    Readiness to change: preparation    Prior research on procedure: internet and friends or family    Comprehension: verbalizes understanding     Expected Compliance: good    Recommendations  Pt is an appropriate candidate for surgery  Yes    Evaluation / Monitoring  Dietitian to Monitor: Eating pattern as discussed Tolerance of nutrition prescription Body weight Lab values Physical activity Bowel pattern    Pt continues to do well with the weight loss  Her paperwork has been submitted for authorization, awaiting to hear back  Today we reviewed post-op vitamins and provided with samples of chewables  Advised her we will see what her weight is closer to surgery to determine if she needs to be on the full pre-op diet, a modified version or none at all  Goals  · Continue 64oz water per day  · Food journal  · Get back to walking as tolerated since sprained ankles   · Complete lesson plans 1-6  · Eat 3 meals per day, can use protein shake as a meal replacement as needed    · Eliminate mindless snacking  · Try samples of vitamins provided  · Weight closer to surgery will determine if pt needs full, modified or no pre-op diet    Time Spent:   30 mins

## 2021-10-04 ENCOUNTER — PREP FOR PROCEDURE (OUTPATIENT)
Dept: BARIATRICS | Facility: CLINIC | Age: 46
End: 2021-10-04

## 2021-10-04 DIAGNOSIS — E66.01 MORBID OBESITY (HCC): Primary | ICD-10-CM

## 2021-10-04 DIAGNOSIS — E28.2 PCOS (POLYCYSTIC OVARIAN SYNDROME): ICD-10-CM

## 2021-10-04 DIAGNOSIS — E78.2 MIXED HYPERLIPIDEMIA: ICD-10-CM

## 2021-10-05 ENCOUNTER — OFFICE VISIT (OUTPATIENT)
Dept: OBGYN CLINIC | Facility: CLINIC | Age: 46
End: 2021-10-05
Payer: COMMERCIAL

## 2021-10-05 ENCOUNTER — APPOINTMENT (OUTPATIENT)
Dept: RADIOLOGY | Facility: CLINIC | Age: 46
End: 2021-10-05
Payer: COMMERCIAL

## 2021-10-05 VITALS
HEART RATE: 80 BPM | BODY MASS INDEX: 43.23 KG/M2 | WEIGHT: 229 LBS | DIASTOLIC BLOOD PRESSURE: 80 MMHG | SYSTOLIC BLOOD PRESSURE: 140 MMHG | HEIGHT: 61 IN

## 2021-10-05 DIAGNOSIS — S92.515A CLOSED NONDISPLACED FRACTURE OF PROXIMAL PHALANX OF LESSER TOE OF LEFT FOOT, INITIAL ENCOUNTER: Primary | ICD-10-CM

## 2021-10-05 DIAGNOSIS — M79.672 PAIN IN LEFT FOOT: ICD-10-CM

## 2021-10-05 PROCEDURE — 73630 X-RAY EXAM OF FOOT: CPT

## 2021-10-05 PROCEDURE — 99203 OFFICE O/P NEW LOW 30 MIN: CPT | Performed by: ORTHOPAEDIC SURGERY

## 2021-10-05 PROCEDURE — 28510 TREATMENT OF TOE FRACTURE: CPT | Performed by: ORTHOPAEDIC SURGERY

## 2021-10-22 ENCOUNTER — OFFICE VISIT (OUTPATIENT)
Dept: BARIATRICS | Facility: CLINIC | Age: 46
End: 2021-10-22

## 2021-10-22 DIAGNOSIS — E66.01 MORBID (SEVERE) OBESITY DUE TO EXCESS CALORIES (HCC): Primary | ICD-10-CM

## 2021-10-22 PROCEDURE — RECHECK

## 2021-10-23 ENCOUNTER — APPOINTMENT (OUTPATIENT)
Dept: LAB | Facility: HOSPITAL | Age: 46
End: 2021-10-23
Payer: COMMERCIAL

## 2021-10-23 DIAGNOSIS — E78.2 MIXED HYPERLIPIDEMIA: ICD-10-CM

## 2021-10-23 LAB
ALBUMIN SERPL BCP-MCNC: 3.5 G/DL (ref 3.5–5)
ALP SERPL-CCNC: 74 U/L (ref 46–116)
ALT SERPL W P-5'-P-CCNC: 32 U/L (ref 12–78)
ANION GAP SERPL CALCULATED.3IONS-SCNC: 9 MMOL/L (ref 4–13)
AST SERPL W P-5'-P-CCNC: 12 U/L (ref 5–45)
BILIRUB SERPL-MCNC: 0.47 MG/DL (ref 0.2–1)
BUN SERPL-MCNC: 10 MG/DL (ref 5–25)
CALCIUM SERPL-MCNC: 8.9 MG/DL (ref 8.3–10.1)
CHLORIDE SERPL-SCNC: 105 MMOL/L (ref 100–108)
CHOLEST SERPL-MCNC: 208 MG/DL (ref 50–200)
CO2 SERPL-SCNC: 26 MMOL/L (ref 21–32)
CREAT SERPL-MCNC: 0.76 MG/DL (ref 0.6–1.3)
GFR SERPL CREATININE-BSD FRML MDRD: 96 ML/MIN/1.73SQ M
GLUCOSE P FAST SERPL-MCNC: 86 MG/DL (ref 65–99)
HDLC SERPL-MCNC: 50 MG/DL
LDLC SERPL CALC-MCNC: 134 MG/DL (ref 0–100)
POTASSIUM SERPL-SCNC: 3.9 MMOL/L (ref 3.5–5.3)
PROT SERPL-MCNC: 6.7 G/DL (ref 6.4–8.2)
SODIUM SERPL-SCNC: 140 MMOL/L (ref 136–145)
TRIGL SERPL-MCNC: 121 MG/DL

## 2021-10-23 PROCEDURE — 80061 LIPID PANEL: CPT

## 2021-10-23 PROCEDURE — 80053 COMPREHEN METABOLIC PANEL: CPT

## 2021-10-23 PROCEDURE — 36415 COLL VENOUS BLD VENIPUNCTURE: CPT

## 2021-10-25 ENCOUNTER — OFFICE VISIT (OUTPATIENT)
Dept: BARIATRICS | Facility: CLINIC | Age: 46
End: 2021-10-25

## 2021-10-25 VITALS — WEIGHT: 225.4 LBS | HEIGHT: 61 IN | BODY MASS INDEX: 42.56 KG/M2

## 2021-10-25 DIAGNOSIS — E66.01 MORBID (SEVERE) OBESITY DUE TO EXCESS CALORIES (HCC): Primary | ICD-10-CM

## 2021-10-25 PROCEDURE — RECHECK

## 2021-10-26 ENCOUNTER — APPOINTMENT (OUTPATIENT)
Dept: RADIOLOGY | Facility: CLINIC | Age: 46
End: 2021-10-26
Payer: COMMERCIAL

## 2021-10-26 ENCOUNTER — OFFICE VISIT (OUTPATIENT)
Dept: OBGYN CLINIC | Facility: CLINIC | Age: 46
End: 2021-10-26

## 2021-10-26 VITALS
HEIGHT: 61 IN | BODY MASS INDEX: 42.48 KG/M2 | HEART RATE: 56 BPM | DIASTOLIC BLOOD PRESSURE: 83 MMHG | WEIGHT: 225 LBS | SYSTOLIC BLOOD PRESSURE: 126 MMHG

## 2021-10-26 DIAGNOSIS — S92.515A CLOSED NONDISPLACED FRACTURE OF PROXIMAL PHALANX OF LESSER TOE OF LEFT FOOT, INITIAL ENCOUNTER: ICD-10-CM

## 2021-10-26 DIAGNOSIS — S92.515A CLOSED NONDISPLACED FRACTURE OF PROXIMAL PHALANX OF LESSER TOE OF LEFT FOOT, INITIAL ENCOUNTER: Primary | ICD-10-CM

## 2021-10-26 PROCEDURE — 99024 POSTOP FOLLOW-UP VISIT: CPT | Performed by: ORTHOPAEDIC SURGERY

## 2021-10-26 PROCEDURE — 73630 X-RAY EXAM OF FOOT: CPT

## 2021-11-10 DIAGNOSIS — F41.9 ANXIETY: ICD-10-CM

## 2021-11-10 RX ORDER — ESCITALOPRAM OXALATE 10 MG/1
10 TABLET ORAL DAILY
Qty: 90 TABLET | Refills: 1 | Status: SHIPPED | OUTPATIENT
Start: 2021-11-10 | End: 2022-07-07 | Stop reason: SDUPTHER

## 2021-11-19 ENCOUNTER — OFFICE VISIT (OUTPATIENT)
Dept: BARIATRICS | Facility: CLINIC | Age: 46
End: 2021-11-19
Payer: COMMERCIAL

## 2021-11-19 ENCOUNTER — DOCUMENTATION (OUTPATIENT)
Dept: BARIATRICS | Facility: CLINIC | Age: 46
End: 2021-11-19

## 2021-11-19 VITALS
TEMPERATURE: 101.2 F | BODY MASS INDEX: 41 KG/M2 | HEART RATE: 71 BPM | SYSTOLIC BLOOD PRESSURE: 140 MMHG | WEIGHT: 222.8 LBS | HEIGHT: 62 IN | DIASTOLIC BLOOD PRESSURE: 90 MMHG

## 2021-11-19 DIAGNOSIS — E28.2 POLYCYSTIC OVARIAN SYNDROME: ICD-10-CM

## 2021-11-19 DIAGNOSIS — F41.9 ANXIETY: ICD-10-CM

## 2021-11-19 DIAGNOSIS — E78.2 MIXED HYPERLIPIDEMIA: ICD-10-CM

## 2021-11-19 DIAGNOSIS — E66.01 MORBID OBESITY (HCC): Primary | ICD-10-CM

## 2021-11-19 PROCEDURE — 99213 OFFICE O/P EST LOW 20 MIN: CPT | Performed by: SURGERY

## 2021-11-19 RX ORDER — HEPARIN SODIUM 5000 [USP'U]/ML
5000 INJECTION, SOLUTION INTRAVENOUS; SUBCUTANEOUS
Status: CANCELLED | OUTPATIENT
Start: 2021-11-29 | End: 2021-11-30

## 2021-11-19 RX ORDER — CELECOXIB 100 MG/1
200 CAPSULE ORAL ONCE
Status: CANCELLED | OUTPATIENT
Start: 2021-11-29 | End: 2021-11-19

## 2021-11-19 RX ORDER — ACETAMINOPHEN 325 MG/1
975 TABLET ORAL ONCE
Status: CANCELLED | OUTPATIENT
Start: 2021-11-29 | End: 2021-11-19

## 2021-11-19 RX ORDER — GABAPENTIN 100 MG/1
600 CAPSULE ORAL ONCE
Status: CANCELLED | OUTPATIENT
Start: 2021-11-29 | End: 2021-11-19

## 2021-11-19 RX ORDER — SCOLOPAMINE TRANSDERMAL SYSTEM 1 MG/1
1 PATCH, EXTENDED RELEASE TRANSDERMAL ONCE
Status: CANCELLED | OUTPATIENT
Start: 2021-11-29 | End: 2021-11-19

## 2021-11-24 ENCOUNTER — TELEPHONE (OUTPATIENT)
Dept: BARIATRICS | Facility: CLINIC | Age: 46
End: 2021-11-24

## 2021-11-24 DIAGNOSIS — Z98.84 S/P GASTRIC BYPASS: Primary | ICD-10-CM

## 2021-11-24 RX ORDER — OMEPRAZOLE 20 MG/1
20 CAPSULE, DELAYED RELEASE ORAL DAILY
Qty: 90 CAPSULE | Refills: 1 | Status: SHIPPED | OUTPATIENT
Start: 2021-11-24 | End: 2022-06-24

## 2021-11-26 ENCOUNTER — ANESTHESIA EVENT (OUTPATIENT)
Dept: PERIOP | Facility: HOSPITAL | Age: 46
DRG: 621 | End: 2021-11-26
Payer: COMMERCIAL

## 2021-11-29 ENCOUNTER — TELEPHONE (OUTPATIENT)
Dept: BARIATRICS | Facility: CLINIC | Age: 46
End: 2021-11-29

## 2021-11-29 ENCOUNTER — HOSPITAL ENCOUNTER (INPATIENT)
Facility: HOSPITAL | Age: 46
LOS: 1 days | Discharge: HOME | DRG: 621 | End: 2021-11-30
Attending: SURGERY | Admitting: SURGERY
Payer: COMMERCIAL

## 2021-11-29 ENCOUNTER — ANESTHESIA (OUTPATIENT)
Dept: PERIOP | Facility: HOSPITAL | Age: 46
DRG: 621 | End: 2021-11-29
Payer: COMMERCIAL

## 2021-11-29 DIAGNOSIS — E66.01 MORBID OBESITY (HCC): Primary | ICD-10-CM

## 2021-11-29 DIAGNOSIS — L76.82 INCISIONAL PAIN: ICD-10-CM

## 2021-11-29 LAB
EXT PREGNANCY TEST URINE: NEGATIVE
EXT. CONTROL: NORMAL

## 2021-11-29 PROCEDURE — 43644 LAP GASTRIC BYPASS/ROUX-EN-Y: CPT | Performed by: SURGERY

## 2021-11-29 PROCEDURE — 81025 URINE PREGNANCY TEST: CPT | Performed by: SURGERY

## 2021-11-29 PROCEDURE — 43644 LAP GASTRIC BYPASS/ROUX-EN-Y: CPT | Performed by: PHYSICIAN ASSISTANT

## 2021-11-29 PROCEDURE — NC001 PR NO CHARGE: Performed by: SURGERY

## 2021-11-29 PROCEDURE — 0DJ08ZZ INSPECTION OF UPPER INTESTINAL TRACT, VIA NATURAL OR ARTIFICIAL OPENING ENDOSCOPIC: ICD-10-PCS | Performed by: SURGERY

## 2021-11-29 PROCEDURE — 0D164ZA BYPASS STOMACH TO JEJUNUM, PERCUTANEOUS ENDOSCOPIC APPROACH: ICD-10-PCS | Performed by: SURGERY

## 2021-11-29 PROCEDURE — C9290 INJ, BUPIVACAINE LIPOSOME: HCPCS | Performed by: SURGERY

## 2021-11-29 PROCEDURE — C9113 INJ PANTOPRAZOLE SODIUM, VIA: HCPCS | Performed by: SURGERY

## 2021-11-29 RX ORDER — SCOLOPAMINE TRANSDERMAL SYSTEM 1 MG/1
1 PATCH, EXTENDED RELEASE TRANSDERMAL ONCE
Status: DISCONTINUED | OUTPATIENT
Start: 2021-11-29 | End: 2021-11-30 | Stop reason: HOSPADM

## 2021-11-29 RX ORDER — HEPARIN SODIUM 5000 [USP'U]/ML
5000 INJECTION, SOLUTION INTRAVENOUS; SUBCUTANEOUS
Status: COMPLETED | OUTPATIENT
Start: 2021-11-29 | End: 2021-11-29

## 2021-11-29 RX ORDER — CELECOXIB 100 MG/1
200 CAPSULE ORAL ONCE
Status: COMPLETED | OUTPATIENT
Start: 2021-11-29 | End: 2021-11-29

## 2021-11-29 RX ORDER — ONDANSETRON 2 MG/ML
4 INJECTION INTRAMUSCULAR; INTRAVENOUS ONCE AS NEEDED
Status: DISCONTINUED | OUTPATIENT
Start: 2021-11-29 | End: 2021-11-29 | Stop reason: HOSPADM

## 2021-11-29 RX ORDER — SODIUM CHLORIDE, SODIUM LACTATE, POTASSIUM CHLORIDE, CALCIUM CHLORIDE 600; 310; 30; 20 MG/100ML; MG/100ML; MG/100ML; MG/100ML
125 INJECTION, SOLUTION INTRAVENOUS CONTINUOUS
Status: DISCONTINUED | OUTPATIENT
Start: 2021-11-29 | End: 2021-11-29

## 2021-11-29 RX ORDER — EPHEDRINE SULFATE 50 MG/ML
INJECTION INTRAVENOUS AS NEEDED
Status: DISCONTINUED | OUTPATIENT
Start: 2021-11-29 | End: 2021-11-29

## 2021-11-29 RX ORDER — MAGNESIUM HYDROXIDE 1200 MG/15ML
LIQUID ORAL AS NEEDED
Status: DISCONTINUED | OUTPATIENT
Start: 2021-11-29 | End: 2021-11-29 | Stop reason: HOSPADM

## 2021-11-29 RX ORDER — PROPOFOL 10 MG/ML
INJECTION, EMULSION INTRAVENOUS AS NEEDED
Status: DISCONTINUED | OUTPATIENT
Start: 2021-11-29 | End: 2021-11-29

## 2021-11-29 RX ORDER — PROMETHAZINE HYDROCHLORIDE 25 MG/ML
25 INJECTION, SOLUTION INTRAMUSCULAR; INTRAVENOUS EVERY 6 HOURS PRN
Status: DISCONTINUED | OUTPATIENT
Start: 2021-11-29 | End: 2021-11-30 | Stop reason: HOSPADM

## 2021-11-29 RX ORDER — PROPOFOL 10 MG/ML
INJECTION, EMULSION INTRAVENOUS CONTINUOUS PRN
Status: DISCONTINUED | OUTPATIENT
Start: 2021-11-29 | End: 2021-11-29

## 2021-11-29 RX ORDER — SUCCINYLCHOLINE/SOD CL,ISO/PF 100 MG/5ML
SYRINGE (ML) INTRAVENOUS AS NEEDED
Status: DISCONTINUED | OUTPATIENT
Start: 2021-11-29 | End: 2021-11-29

## 2021-11-29 RX ORDER — SODIUM CHLORIDE, SODIUM LACTATE, POTASSIUM CHLORIDE, CALCIUM CHLORIDE 600; 310; 30; 20 MG/100ML; MG/100ML; MG/100ML; MG/100ML
100 INJECTION, SOLUTION INTRAVENOUS CONTINUOUS
Status: DISCONTINUED | OUTPATIENT
Start: 2021-11-29 | End: 2021-11-29

## 2021-11-29 RX ORDER — MIDAZOLAM HYDROCHLORIDE 2 MG/2ML
INJECTION, SOLUTION INTRAMUSCULAR; INTRAVENOUS AS NEEDED
Status: DISCONTINUED | OUTPATIENT
Start: 2021-11-29 | End: 2021-11-29

## 2021-11-29 RX ORDER — CEFAZOLIN SODIUM 2 G/50ML
2000 SOLUTION INTRAVENOUS ONCE
Status: DISCONTINUED | OUTPATIENT
Start: 2021-11-29 | End: 2021-11-29

## 2021-11-29 RX ORDER — LORAZEPAM 2 MG/ML
0.5 INJECTION INTRAMUSCULAR EVERY 6 HOURS PRN
Status: DISCONTINUED | OUTPATIENT
Start: 2021-11-29 | End: 2021-11-30 | Stop reason: HOSPADM

## 2021-11-29 RX ORDER — LIDOCAINE HYDROCHLORIDE 10 MG/ML
INJECTION, SOLUTION EPIDURAL; INFILTRATION; INTRACAUDAL; PERINEURAL AS NEEDED
Status: DISCONTINUED | OUTPATIENT
Start: 2021-11-29 | End: 2021-11-29

## 2021-11-29 RX ORDER — ACETAMINOPHEN 325 MG/1
975 TABLET ORAL EVERY 6 HOURS SCHEDULED
Status: DISCONTINUED | OUTPATIENT
Start: 2021-11-29 | End: 2021-11-30 | Stop reason: HOSPADM

## 2021-11-29 RX ORDER — OXYCODONE HCL 5 MG/5 ML
5 SOLUTION, ORAL ORAL EVERY 4 HOURS PRN
Status: DISCONTINUED | OUTPATIENT
Start: 2021-11-29 | End: 2021-11-30 | Stop reason: HOSPADM

## 2021-11-29 RX ORDER — KETOROLAC TROMETHAMINE 30 MG/ML
15 INJECTION, SOLUTION INTRAMUSCULAR; INTRAVENOUS EVERY 6 HOURS SCHEDULED
Status: COMPLETED | OUTPATIENT
Start: 2021-11-29 | End: 2021-11-30

## 2021-11-29 RX ORDER — CEFAZOLIN SODIUM 2 G/50ML
SOLUTION INTRAVENOUS AS NEEDED
Status: DISCONTINUED | OUTPATIENT
Start: 2021-11-29 | End: 2021-11-29

## 2021-11-29 RX ORDER — METOCLOPRAMIDE HYDROCHLORIDE 5 MG/ML
10 INJECTION INTRAMUSCULAR; INTRAVENOUS EVERY 6 HOURS PRN
Status: DISCONTINUED | OUTPATIENT
Start: 2021-11-29 | End: 2021-11-30 | Stop reason: HOSPADM

## 2021-11-29 RX ORDER — OXYCODONE HCL 5 MG/5 ML
10 SOLUTION, ORAL ORAL EVERY 4 HOURS PRN
Status: DISCONTINUED | OUTPATIENT
Start: 2021-11-29 | End: 2021-11-30 | Stop reason: HOSPADM

## 2021-11-29 RX ORDER — ACETAMINOPHEN 325 MG/1
975 TABLET ORAL ONCE
Status: COMPLETED | OUTPATIENT
Start: 2021-11-29 | End: 2021-11-29

## 2021-11-29 RX ORDER — SODIUM CHLORIDE 9 MG/ML
INJECTION, SOLUTION INTRAVENOUS CONTINUOUS PRN
Status: DISCONTINUED | OUTPATIENT
Start: 2021-11-29 | End: 2021-11-29

## 2021-11-29 RX ORDER — ONDANSETRON 2 MG/ML
INJECTION INTRAMUSCULAR; INTRAVENOUS AS NEEDED
Status: DISCONTINUED | OUTPATIENT
Start: 2021-11-29 | End: 2021-11-29

## 2021-11-29 RX ORDER — ONDANSETRON 2 MG/ML
4 INJECTION INTRAMUSCULAR; INTRAVENOUS EVERY 4 HOURS PRN
Status: DISCONTINUED | OUTPATIENT
Start: 2021-11-29 | End: 2021-11-30 | Stop reason: HOSPADM

## 2021-11-29 RX ORDER — PANTOPRAZOLE SODIUM 40 MG/1
40 INJECTION, POWDER, FOR SOLUTION INTRAVENOUS ONCE
Status: COMPLETED | OUTPATIENT
Start: 2021-11-29 | End: 2021-11-29

## 2021-11-29 RX ORDER — SODIUM CHLORIDE, SODIUM LACTATE, POTASSIUM CHLORIDE, CALCIUM CHLORIDE 600; 310; 30; 20 MG/100ML; MG/100ML; MG/100ML; MG/100ML
100 INJECTION, SOLUTION INTRAVENOUS CONTINUOUS
Status: DISCONTINUED | OUTPATIENT
Start: 2021-11-29 | End: 2021-11-30 | Stop reason: HOSPADM

## 2021-11-29 RX ORDER — CEFAZOLIN SODIUM 2 G/50ML
2000 SOLUTION INTRAVENOUS EVERY 8 HOURS
Status: COMPLETED | OUTPATIENT
Start: 2021-11-29 | End: 2021-11-30

## 2021-11-29 RX ORDER — CEFOXITIN SODIUM 2 G/50ML
2000 INJECTION, SOLUTION INTRAVENOUS ONCE
Status: DISCONTINUED | OUTPATIENT
Start: 2021-11-29 | End: 2021-11-29

## 2021-11-29 RX ORDER — HYDROMORPHONE HCL/PF 1 MG/ML
1 SYRINGE (ML) INJECTION EVERY 4 HOURS PRN
Status: DISCONTINUED | OUTPATIENT
Start: 2021-11-29 | End: 2021-11-30 | Stop reason: HOSPADM

## 2021-11-29 RX ORDER — SODIUM CHLORIDE, SODIUM LACTATE, POTASSIUM CHLORIDE, CALCIUM CHLORIDE 600; 310; 30; 20 MG/100ML; MG/100ML; MG/100ML; MG/100ML
INJECTION, SOLUTION INTRAVENOUS CONTINUOUS PRN
Status: DISCONTINUED | OUTPATIENT
Start: 2021-11-29 | End: 2021-11-29

## 2021-11-29 RX ORDER — ESCITALOPRAM OXALATE 10 MG/1
10 TABLET ORAL DAILY
Status: DISCONTINUED | OUTPATIENT
Start: 2021-11-29 | End: 2021-11-30 | Stop reason: HOSPADM

## 2021-11-29 RX ORDER — FENTANYL CITRATE/PF 50 MCG/ML
25 SYRINGE (ML) INJECTION
Status: DISCONTINUED | OUTPATIENT
Start: 2021-11-29 | End: 2021-11-29 | Stop reason: HOSPADM

## 2021-11-29 RX ORDER — BUPIVACAINE HYDROCHLORIDE 5 MG/ML
INJECTION, SOLUTION PERINEURAL AS NEEDED
Status: DISCONTINUED | OUTPATIENT
Start: 2021-11-29 | End: 2021-11-29 | Stop reason: HOSPADM

## 2021-11-29 RX ORDER — SIMETHICONE 80 MG
80 TABLET,CHEWABLE ORAL 2 TIMES DAILY
Status: DISCONTINUED | OUTPATIENT
Start: 2021-11-29 | End: 2021-11-30 | Stop reason: HOSPADM

## 2021-11-29 RX ORDER — ROCURONIUM BROMIDE 10 MG/ML
INJECTION, SOLUTION INTRAVENOUS AS NEEDED
Status: DISCONTINUED | OUTPATIENT
Start: 2021-11-29 | End: 2021-11-29

## 2021-11-29 RX ORDER — FENTANYL CITRATE 50 UG/ML
INJECTION, SOLUTION INTRAMUSCULAR; INTRAVENOUS AS NEEDED
Status: DISCONTINUED | OUTPATIENT
Start: 2021-11-29 | End: 2021-11-29

## 2021-11-29 RX ORDER — GABAPENTIN 100 MG/1
600 CAPSULE ORAL ONCE
Status: COMPLETED | OUTPATIENT
Start: 2021-11-29 | End: 2021-11-29

## 2021-11-29 RX ORDER — CEFOXITIN 1 G/1
2000 INJECTION, POWDER, FOR SOLUTION INTRAVENOUS ONCE
Status: DISCONTINUED | OUTPATIENT
Start: 2021-11-29 | End: 2021-11-29 | Stop reason: CLARIF

## 2021-11-29 RX ORDER — DEXAMETHASONE SODIUM PHOSPHATE 4 MG/ML
INJECTION, SOLUTION INTRA-ARTICULAR; INTRALESIONAL; INTRAMUSCULAR; INTRAVENOUS; SOFT TISSUE AS NEEDED
Status: DISCONTINUED | OUTPATIENT
Start: 2021-11-29 | End: 2021-11-29

## 2021-11-29 RX ADMIN — METRONIDAZOLE 500 MG: 500 INJECTION, SOLUTION INTRAVENOUS at 07:35

## 2021-11-29 RX ADMIN — SODIUM CHLORIDE, SODIUM LACTATE, POTASSIUM CHLORIDE, AND CALCIUM CHLORIDE: .6; .31; .03; .02 INJECTION, SOLUTION INTRAVENOUS at 07:27

## 2021-11-29 RX ADMIN — MIDAZOLAM 2 MG: 1 INJECTION INTRAMUSCULAR; INTRAVENOUS at 07:26

## 2021-11-29 RX ADMIN — ESCITALOPRAM OXALATE 10 MG: 10 TABLET ORAL at 16:17

## 2021-11-29 RX ADMIN — SODIUM CHLORIDE: 0.9 INJECTION, SOLUTION INTRAVENOUS at 07:33

## 2021-11-29 RX ADMIN — PROPOFOL 140 MCG/KG/MIN: 10 INJECTION, EMULSION INTRAVENOUS at 07:33

## 2021-11-29 RX ADMIN — OXYCODONE HYDROCHLORIDE 5 MG: 5 SOLUTION ORAL at 17:23

## 2021-11-29 RX ADMIN — EPHEDRINE SULFATE 5 MG: 50 INJECTION, SOLUTION INTRAVENOUS at 08:33

## 2021-11-29 RX ADMIN — ONDANSETRON 4 MG: 2 INJECTION INTRAMUSCULAR; INTRAVENOUS at 07:41

## 2021-11-29 RX ADMIN — ACETAMINOPHEN 975 MG: 325 TABLET, FILM COATED ORAL at 17:22

## 2021-11-29 RX ADMIN — ACETAMINOPHEN 975 MG: 325 TABLET, FILM COATED ORAL at 12:27

## 2021-11-29 RX ADMIN — FAMOTIDINE 20 MG: 10 INJECTION, SOLUTION INTRAVENOUS at 20:43

## 2021-11-29 RX ADMIN — ROCURONIUM BROMIDE 50 MG: 10 INJECTION, SOLUTION INTRAVENOUS at 07:41

## 2021-11-29 RX ADMIN — FENTANYL CITRATE 25 MCG: 50 INJECTION INTRAMUSCULAR; INTRAVENOUS at 10:53

## 2021-11-29 RX ADMIN — PROPOFOL 200 MG: 10 INJECTION, EMULSION INTRAVENOUS at 07:32

## 2021-11-29 RX ADMIN — FENTANYL CITRATE 50 MCG: 50 INJECTION, SOLUTION INTRAMUSCULAR; INTRAVENOUS at 07:32

## 2021-11-29 RX ADMIN — ROCURONIUM BROMIDE 20 MG: 10 INJECTION, SOLUTION INTRAVENOUS at 08:22

## 2021-11-29 RX ADMIN — CEFAZOLIN SODIUM 2000 MG: 2 SOLUTION INTRAVENOUS at 07:24

## 2021-11-29 RX ADMIN — FENTANYL CITRATE 50 MCG: 50 INJECTION, SOLUTION INTRAMUSCULAR; INTRAVENOUS at 09:21

## 2021-11-29 RX ADMIN — CEFAZOLIN SODIUM 2000 MG: 2 SOLUTION INTRAVENOUS at 16:25

## 2021-11-29 RX ADMIN — SODIUM CHLORIDE, SODIUM LACTATE, POTASSIUM CHLORIDE, AND CALCIUM CHLORIDE 125 ML/HR: .6; .31; .03; .02 INJECTION, SOLUTION INTRAVENOUS at 06:35

## 2021-11-29 RX ADMIN — ACETAMINOPHEN 975 MG: 325 TABLET, FILM COATED ORAL at 06:34

## 2021-11-29 RX ADMIN — FENTANYL CITRATE 25 MCG: 50 INJECTION INTRAMUSCULAR; INTRAVENOUS at 10:39

## 2021-11-29 RX ADMIN — ACETAMINOPHEN 975 MG: 325 TABLET, FILM COATED ORAL at 23:29

## 2021-11-29 RX ADMIN — PANTOPRAZOLE SODIUM 40 MG: 40 INJECTION, POWDER, FOR SOLUTION INTRAVENOUS at 10:29

## 2021-11-29 RX ADMIN — SODIUM CHLORIDE, SODIUM LACTATE, POTASSIUM CHLORIDE, AND CALCIUM CHLORIDE 100 ML/HR: .6; .31; .03; .02 INJECTION, SOLUTION INTRAVENOUS at 13:32

## 2021-11-29 RX ADMIN — OXYCODONE HYDROCHLORIDE 10 MG: 5 SOLUTION ORAL at 13:05

## 2021-11-29 RX ADMIN — FENTANYL CITRATE 50 MCG: 50 INJECTION, SOLUTION INTRAMUSCULAR; INTRAVENOUS at 07:36

## 2021-11-29 RX ADMIN — CELECOXIB 200 MG: 100 CAPSULE ORAL at 06:34

## 2021-11-29 RX ADMIN — SODIUM CHLORIDE, SODIUM LACTATE, POTASSIUM CHLORIDE, AND CALCIUM CHLORIDE 100 ML/HR: .6; .31; .03; .02 INJECTION, SOLUTION INTRAVENOUS at 12:23

## 2021-11-29 RX ADMIN — FENTANYL CITRATE 50 MCG: 50 INJECTION, SOLUTION INTRAMUSCULAR; INTRAVENOUS at 08:05

## 2021-11-29 RX ADMIN — KETOROLAC TROMETHAMINE 15 MG: 30 INJECTION, SOLUTION INTRAMUSCULAR at 16:17

## 2021-11-29 RX ADMIN — GABAPENTIN 600 MG: 100 CAPSULE ORAL at 06:33

## 2021-11-29 RX ADMIN — KETOROLAC TROMETHAMINE 15 MG: 30 INJECTION, SOLUTION INTRAMUSCULAR at 23:29

## 2021-11-29 RX ADMIN — CEFAZOLIN SODIUM 2000 MG: 2 SOLUTION INTRAVENOUS at 22:44

## 2021-11-29 RX ADMIN — SIMETHICONE 80 MG: 80 TABLET, CHEWABLE ORAL at 20:43

## 2021-11-29 RX ADMIN — LIDOCAINE HYDROCHLORIDE 50 MG: 10 INJECTION, SOLUTION EPIDURAL; INFILTRATION; INTRACAUDAL; PERINEURAL at 07:32

## 2021-11-29 RX ADMIN — Medication 100 MG: at 07:32

## 2021-11-29 RX ADMIN — SUGAMMADEX 200 MG: 100 INJECTION, SOLUTION INTRAVENOUS at 09:40

## 2021-11-29 RX ADMIN — DEXAMETHASONE SODIUM PHOSPHATE 4 MG: 4 INJECTION, SOLUTION INTRA-ARTICULAR; INTRALESIONAL; INTRAMUSCULAR; INTRAVENOUS; SOFT TISSUE at 07:41

## 2021-11-29 RX ADMIN — FENTANYL CITRATE 25 MCG: 50 INJECTION INTRAMUSCULAR; INTRAVENOUS at 10:31

## 2021-11-29 RX ADMIN — ROCURONIUM BROMIDE 10 MG: 10 INJECTION, SOLUTION INTRAVENOUS at 08:53

## 2021-11-29 RX ADMIN — PHENYLEPHRINE HYDROCHLORIDE 20 MCG/MIN: 10 INJECTION INTRAVENOUS at 07:43

## 2021-11-29 RX ADMIN — SCOPALAMINE 1 PATCH: 1 PATCH, EXTENDED RELEASE TRANSDERMAL at 06:34

## 2021-11-29 RX ADMIN — HEPARIN SODIUM 5000 UNITS: 5000 INJECTION INTRAVENOUS; SUBCUTANEOUS at 06:34

## 2021-11-29 RX ADMIN — FENTANYL CITRATE 25 MCG: 50 INJECTION INTRAMUSCULAR; INTRAVENOUS at 10:20

## 2021-11-30 LAB
ANION GAP SERPL CALCULATED.3IONS-SCNC: 9 MMOL/L (ref 4–13)
BUN SERPL-MCNC: 8 MG/DL (ref 5–25)
CALCIUM SERPL-MCNC: 8.9 MG/DL (ref 8.3–10.1)
CHLORIDE SERPL-SCNC: 107 MMOL/L (ref 100–108)
CO2 SERPL-SCNC: 26 MMOL/L (ref 21–32)
CREAT SERPL-MCNC: 0.74 MG/DL (ref 0.6–1.3)
ERYTHROCYTE [DISTWIDTH] IN BLOOD BY AUTOMATED COUNT: 12.6 % (ref 11.6–15.1)
GFR SERPL CREATININE-BSD FRML MDRD: 98 ML/MIN/1.73SQ M
GLUCOSE SERPL-MCNC: 100 MG/DL (ref 65–140)
HCT VFR BLD AUTO: 39 % (ref 34.8–46.1)
HGB BLD-MCNC: 12.8 G/DL (ref 11.5–15.4)
MCH RBC QN AUTO: 31.5 PG (ref 26.8–34.3)
MCHC RBC AUTO-ENTMCNC: 32.8 G/DL (ref 31.4–37.4)
MCV RBC AUTO: 96 FL (ref 82–98)
PLATELET # BLD AUTO: 314 THOUSANDS/UL (ref 149–390)
PMV BLD AUTO: 11.8 FL (ref 8.9–12.7)
POTASSIUM SERPL-SCNC: 4.1 MMOL/L (ref 3.5–5.3)
RBC # BLD AUTO: 4.06 MILLION/UL (ref 3.81–5.12)
SODIUM SERPL-SCNC: 142 MMOL/L (ref 136–145)
WBC # BLD AUTO: 17.62 THOUSAND/UL (ref 4.31–10.16)

## 2021-11-30 PROCEDURE — 99024 POSTOP FOLLOW-UP VISIT: CPT | Performed by: SURGERY

## 2021-11-30 PROCEDURE — NC001 PR NO CHARGE: Performed by: SURGERY

## 2021-11-30 PROCEDURE — 85027 COMPLETE CBC AUTOMATED: CPT | Performed by: SURGERY

## 2021-11-30 PROCEDURE — 80048 BASIC METABOLIC PNL TOTAL CA: CPT | Performed by: SURGERY

## 2021-11-30 RX ORDER — OXYCODONE HYDROCHLORIDE 5 MG/1
5 TABLET ORAL EVERY 4 HOURS PRN
Qty: 10 TABLET | Refills: 0 | Status: SHIPPED | OUTPATIENT
Start: 2021-11-30 | End: 2021-12-07 | Stop reason: ALTCHOICE

## 2021-11-30 RX ORDER — ACETAMINOPHEN 325 MG/1
975 TABLET ORAL EVERY 8 HOURS
Refills: 0
Start: 2021-11-30 | End: 2021-12-07

## 2021-11-30 RX ADMIN — FAMOTIDINE 20 MG: 10 INJECTION, SOLUTION INTRAVENOUS at 08:50

## 2021-11-30 RX ADMIN — SIMETHICONE 80 MG: 80 TABLET, CHEWABLE ORAL at 08:50

## 2021-11-30 RX ADMIN — KETOROLAC TROMETHAMINE 15 MG: 30 INJECTION, SOLUTION INTRAMUSCULAR at 11:28

## 2021-11-30 RX ADMIN — ESCITALOPRAM OXALATE 10 MG: 10 TABLET ORAL at 08:50

## 2021-11-30 RX ADMIN — SODIUM CHLORIDE, SODIUM LACTATE, POTASSIUM CHLORIDE, AND CALCIUM CHLORIDE 100 ML/HR: .6; .31; .03; .02 INJECTION, SOLUTION INTRAVENOUS at 00:29

## 2021-11-30 RX ADMIN — ACETAMINOPHEN 975 MG: 325 TABLET, FILM COATED ORAL at 06:17

## 2021-11-30 RX ADMIN — KETOROLAC TROMETHAMINE 15 MG: 30 INJECTION, SOLUTION INTRAMUSCULAR at 06:14

## 2021-11-30 RX ADMIN — ACETAMINOPHEN 975 MG: 325 TABLET, FILM COATED ORAL at 11:28

## 2021-12-01 ENCOUNTER — TRANSITIONAL CARE MANAGEMENT (OUTPATIENT)
Dept: FAMILY MEDICINE CLINIC | Facility: CLINIC | Age: 46
End: 2021-12-01

## 2021-12-01 ENCOUNTER — TELEPHONE (OUTPATIENT)
Dept: BARIATRICS | Facility: CLINIC | Age: 46
End: 2021-12-01

## 2021-12-01 VITALS
HEIGHT: 62 IN | WEIGHT: 219.2 LBS | SYSTOLIC BLOOD PRESSURE: 137 MMHG | RESPIRATION RATE: 18 BRPM | DIASTOLIC BLOOD PRESSURE: 78 MMHG | BODY MASS INDEX: 40.34 KG/M2 | TEMPERATURE: 98.1 F | OXYGEN SATURATION: 95 % | HEART RATE: 55 BPM

## 2021-12-07 ENCOUNTER — IMMUNIZATIONS (OUTPATIENT)
Dept: FAMILY MEDICINE CLINIC | Facility: HOSPITAL | Age: 46
End: 2021-12-07

## 2021-12-07 ENCOUNTER — OFFICE VISIT (OUTPATIENT)
Dept: FAMILY MEDICINE CLINIC | Facility: CLINIC | Age: 46
End: 2021-12-07
Payer: COMMERCIAL

## 2021-12-07 VITALS
BODY MASS INDEX: 39.01 KG/M2 | TEMPERATURE: 97.6 F | RESPIRATION RATE: 18 BRPM | HEIGHT: 62 IN | OXYGEN SATURATION: 99 % | DIASTOLIC BLOOD PRESSURE: 80 MMHG | WEIGHT: 212 LBS | HEART RATE: 82 BPM | SYSTOLIC BLOOD PRESSURE: 132 MMHG

## 2021-12-07 DIAGNOSIS — E78.2 MIXED HYPERLIPIDEMIA: ICD-10-CM

## 2021-12-07 DIAGNOSIS — Z98.84 S/P BARIATRIC SURGERY: Primary | ICD-10-CM

## 2021-12-07 DIAGNOSIS — Z13.0 SCREENING FOR DEFICIENCY ANEMIA: ICD-10-CM

## 2021-12-07 DIAGNOSIS — R73.09 ABNORMAL GLUCOSE: ICD-10-CM

## 2021-12-07 DIAGNOSIS — F41.9 ANXIETY: ICD-10-CM

## 2021-12-07 DIAGNOSIS — E66.09 CLASS 2 OBESITY DUE TO EXCESS CALORIES WITHOUT SERIOUS COMORBIDITY WITH BODY MASS INDEX (BMI) OF 39.0 TO 39.9 IN ADULT: ICD-10-CM

## 2021-12-07 DIAGNOSIS — Z23 ENCOUNTER FOR IMMUNIZATION: Primary | ICD-10-CM

## 2021-12-07 PROBLEM — E66.812 CLASS 2 OBESITY DUE TO EXCESS CALORIES WITHOUT SERIOUS COMORBIDITY WITH BODY MASS INDEX (BMI) OF 39.0 TO 39.9 IN ADULT: Status: ACTIVE | Noted: 2021-02-08

## 2021-12-07 PROCEDURE — 91306 COVID-19 MODERNA VACC 0.25 ML BOOSTER: CPT

## 2021-12-07 PROCEDURE — 0064A COVID-19 MODERNA VACC 0.25 ML BOOSTER: CPT

## 2021-12-07 PROCEDURE — 99495 TRANSJ CARE MGMT MOD F2F 14D: CPT | Performed by: FAMILY MEDICINE

## 2021-12-09 PROBLEM — Z48.815 ENCOUNTER FOR SURGICAL AFTERCARE FOLLOWING SURGERY OF DIGESTIVE SYSTEM: Status: ACTIVE | Noted: 2021-12-09

## 2021-12-09 PROBLEM — K91.2 POSTSURGICAL MALABSORPTION: Status: ACTIVE | Noted: 2021-12-09

## 2021-12-10 ENCOUNTER — OFFICE VISIT (OUTPATIENT)
Dept: BARIATRICS | Facility: CLINIC | Age: 46
End: 2021-12-10

## 2021-12-10 VITALS
HEIGHT: 68 IN | BODY MASS INDEX: 31.83 KG/M2 | WEIGHT: 210 LBS | HEART RATE: 65 BPM | SYSTOLIC BLOOD PRESSURE: 132 MMHG | DIASTOLIC BLOOD PRESSURE: 78 MMHG

## 2021-12-10 DIAGNOSIS — K91.2 POSTSURGICAL MALABSORPTION: ICD-10-CM

## 2021-12-10 DIAGNOSIS — Z48.815 ENCOUNTER FOR SURGICAL AFTERCARE FOLLOWING SURGERY OF DIGESTIVE SYSTEM: Primary | ICD-10-CM

## 2021-12-10 DIAGNOSIS — R73.09 ABNORMAL GLUCOSE: ICD-10-CM

## 2021-12-10 DIAGNOSIS — E78.2 MIXED HYPERLIPIDEMIA: ICD-10-CM

## 2021-12-10 PROCEDURE — 99024 POSTOP FOLLOW-UP VISIT: CPT | Performed by: PHYSICIAN ASSISTANT

## 2022-01-10 ENCOUNTER — CLINICAL SUPPORT (OUTPATIENT)
Dept: BARIATRICS | Facility: CLINIC | Age: 47
End: 2022-01-10

## 2022-01-10 VITALS — BODY MASS INDEX: 36.14 KG/M2 | WEIGHT: 196.4 LBS | HEIGHT: 62 IN

## 2022-01-10 DIAGNOSIS — E66.01 MORBID (SEVERE) OBESITY DUE TO EXCESS CALORIES (HCC): Primary | ICD-10-CM

## 2022-01-10 PROCEDURE — RECHECK

## 2022-01-11 NOTE — PROGRESS NOTES
Weight Management Nutrition Class     Diagnosis: Morbid Obesity    Bariatric Surgeon: Dr Ambika Ruiz    Surgery: Gastric Bypass Laparoscopic    Class: 5 week post op     Topics discussed today include:     fluid goals post op, protein goals post op, constipation, chew food well, exercise, avoidance of alcohol, PPI use, diet progression, hypoglycemia, dumping syndrome, protein supplems, vitamin/mineral supplements and calcium supplements    Patient was able to verbalize basic diet (protein, fluid, vitamin and mineral) recommendations and possible nutrition-related complications   Yes

## 2022-02-26 ENCOUNTER — APPOINTMENT (OUTPATIENT)
Dept: LAB | Facility: HOSPITAL | Age: 47
End: 2022-02-26
Payer: COMMERCIAL

## 2022-02-26 DIAGNOSIS — Z13.0 SCREENING FOR DEFICIENCY ANEMIA: ICD-10-CM

## 2022-02-26 DIAGNOSIS — K91.2 POSTSURGICAL MALABSORPTION: ICD-10-CM

## 2022-02-26 DIAGNOSIS — R73.09 ABNORMAL GLUCOSE: ICD-10-CM

## 2022-02-26 DIAGNOSIS — E78.2 MIXED HYPERLIPIDEMIA: ICD-10-CM

## 2022-02-26 LAB
25(OH)D3 SERPL-MCNC: 74.3 NG/ML (ref 30–100)
ALBUMIN SERPL BCP-MCNC: 3.7 G/DL (ref 3.5–5)
ALP SERPL-CCNC: 91 U/L (ref 46–116)
ALT SERPL W P-5'-P-CCNC: 42 U/L (ref 12–78)
ANION GAP SERPL CALCULATED.3IONS-SCNC: 11 MMOL/L (ref 4–13)
AST SERPL W P-5'-P-CCNC: 14 U/L (ref 5–45)
BASOPHILS # BLD AUTO: 0.04 THOUSANDS/ΜL (ref 0–0.1)
BASOPHILS NFR BLD AUTO: 0 % (ref 0–1)
BILIRUB SERPL-MCNC: 0.93 MG/DL (ref 0.2–1)
BUN SERPL-MCNC: 14 MG/DL (ref 5–25)
CALCIUM SERPL-MCNC: 9 MG/DL (ref 8.3–10.1)
CHLORIDE SERPL-SCNC: 107 MMOL/L (ref 100–108)
CHOLEST SERPL-MCNC: 168 MG/DL
CO2 SERPL-SCNC: 24 MMOL/L (ref 21–32)
CREAT SERPL-MCNC: 0.72 MG/DL (ref 0.6–1.3)
EOSINOPHIL # BLD AUTO: 0.06 THOUSAND/ΜL (ref 0–0.61)
EOSINOPHIL NFR BLD AUTO: 1 % (ref 0–6)
ERYTHROCYTE [DISTWIDTH] IN BLOOD BY AUTOMATED COUNT: 13.7 % (ref 11.6–15.1)
EST. AVERAGE GLUCOSE BLD GHB EST-MCNC: 91 MG/DL
FERRITIN SERPL-MCNC: 144 NG/ML (ref 8–388)
FOLATE SERPL-MCNC: >20 NG/ML (ref 3.1–17.5)
GFR SERPL CREATININE-BSD FRML MDRD: 101 ML/MIN/1.73SQ M
GLUCOSE P FAST SERPL-MCNC: 87 MG/DL (ref 65–99)
HBA1C MFR BLD: 4.8 %
HCT VFR BLD AUTO: 44.2 % (ref 34.8–46.1)
HDLC SERPL-MCNC: 42 MG/DL
HGB BLD-MCNC: 14.5 G/DL (ref 11.5–15.4)
IMM GRANULOCYTES # BLD AUTO: 0.04 THOUSAND/UL (ref 0–0.2)
IMM GRANULOCYTES NFR BLD AUTO: 0 % (ref 0–2)
IRON SATN MFR SERPL: 29 % (ref 15–50)
IRON SERPL-MCNC: 87 UG/DL (ref 50–170)
LDLC SERPL CALC-MCNC: 109 MG/DL (ref 0–100)
LYMPHOCYTES # BLD AUTO: 2.45 THOUSANDS/ΜL (ref 0.6–4.47)
LYMPHOCYTES NFR BLD AUTO: 24 % (ref 14–44)
MCH RBC QN AUTO: 31.3 PG (ref 26.8–34.3)
MCHC RBC AUTO-ENTMCNC: 32.8 G/DL (ref 31.4–37.4)
MCV RBC AUTO: 95 FL (ref 82–98)
MONOCYTES # BLD AUTO: 0.65 THOUSAND/ΜL (ref 0.17–1.22)
MONOCYTES NFR BLD AUTO: 6 % (ref 4–12)
NEUTROPHILS # BLD AUTO: 7.2 THOUSANDS/ΜL (ref 1.85–7.62)
NEUTS SEG NFR BLD AUTO: 69 % (ref 43–75)
NONHDLC SERPL-MCNC: 126 MG/DL
NRBC BLD AUTO-RTO: 0 /100 WBCS
PLATELET # BLD AUTO: 284 THOUSANDS/UL (ref 149–390)
PMV BLD AUTO: 11.7 FL (ref 8.9–12.7)
POTASSIUM SERPL-SCNC: 4.1 MMOL/L (ref 3.5–5.3)
PROT SERPL-MCNC: 7.2 G/DL (ref 6.4–8.2)
PTH-INTACT SERPL-MCNC: 43 PG/ML (ref 18.4–80.1)
RBC # BLD AUTO: 4.64 MILLION/UL (ref 3.81–5.12)
SODIUM SERPL-SCNC: 142 MMOL/L (ref 136–145)
TIBC SERPL-MCNC: 299 UG/DL (ref 250–450)
TRIGL SERPL-MCNC: 86 MG/DL
VIT B12 SERPL-MCNC: 657 PG/ML (ref 100–900)
WBC # BLD AUTO: 10.44 THOUSAND/UL (ref 4.31–10.16)

## 2022-02-26 PROCEDURE — 80061 LIPID PANEL: CPT

## 2022-02-26 PROCEDURE — 84425 ASSAY OF VITAMIN B-1: CPT

## 2022-02-26 PROCEDURE — 82306 VITAMIN D 25 HYDROXY: CPT

## 2022-02-26 PROCEDURE — 82607 VITAMIN B-12: CPT

## 2022-02-26 PROCEDURE — 80053 COMPREHEN METABOLIC PANEL: CPT

## 2022-02-26 PROCEDURE — 83550 IRON BINDING TEST: CPT

## 2022-02-26 PROCEDURE — 36415 COLL VENOUS BLD VENIPUNCTURE: CPT

## 2022-02-26 PROCEDURE — 82728 ASSAY OF FERRITIN: CPT

## 2022-02-26 PROCEDURE — 85025 COMPLETE CBC W/AUTO DIFF WBC: CPT

## 2022-02-26 PROCEDURE — 84630 ASSAY OF ZINC: CPT

## 2022-02-26 PROCEDURE — 82746 ASSAY OF FOLIC ACID SERUM: CPT

## 2022-02-26 PROCEDURE — 83036 HEMOGLOBIN GLYCOSYLATED A1C: CPT

## 2022-02-26 PROCEDURE — 84590 ASSAY OF VITAMIN A: CPT

## 2022-02-26 PROCEDURE — 83970 ASSAY OF PARATHORMONE: CPT

## 2022-02-26 PROCEDURE — 83540 ASSAY OF IRON: CPT

## 2022-03-02 LAB — ZINC SERPL-MCNC: 67 UG/DL (ref 44–115)

## 2022-03-04 LAB — VIT A SERPL-MCNC: 28.5 UG/DL (ref 20.1–62)

## 2022-03-07 LAB — VIT B1 BLD-SCNC: 220.9 NMOL/L (ref 66.5–200)

## 2022-03-18 ENCOUNTER — OFFICE VISIT (OUTPATIENT)
Dept: BARIATRICS | Facility: CLINIC | Age: 47
End: 2022-03-18
Payer: COMMERCIAL

## 2022-03-18 VITALS
HEIGHT: 62 IN | DIASTOLIC BLOOD PRESSURE: 70 MMHG | HEART RATE: 59 BPM | SYSTOLIC BLOOD PRESSURE: 106 MMHG | BODY MASS INDEX: 33.31 KG/M2 | WEIGHT: 181 LBS

## 2022-03-18 DIAGNOSIS — F41.9 ANXIETY: ICD-10-CM

## 2022-03-18 DIAGNOSIS — E78.2 MIXED HYPERLIPIDEMIA: ICD-10-CM

## 2022-03-18 DIAGNOSIS — E28.2 POLYCYSTIC OVARIAN SYNDROME: ICD-10-CM

## 2022-03-18 DIAGNOSIS — K91.2 POSTSURGICAL MALABSORPTION: Primary | ICD-10-CM

## 2022-03-18 DIAGNOSIS — Z48.815 ENCOUNTER FOR SURGICAL AFTERCARE FOLLOWING SURGERY ON THE DIGESTIVE SYSTEM: ICD-10-CM

## 2022-03-18 DIAGNOSIS — Z98.84 GASTRIC BYPASS STATUS FOR OBESITY: ICD-10-CM

## 2022-03-18 DIAGNOSIS — E66.9 OBESITY, CLASS I, BMI 30-34.9: ICD-10-CM

## 2022-03-18 PROBLEM — E66.811 OBESITY, CLASS I, BMI 30-34.9: Status: ACTIVE | Noted: 2022-03-18

## 2022-03-18 PROBLEM — E66.812 CLASS 2 OBESITY DUE TO EXCESS CALORIES WITHOUT SERIOUS COMORBIDITY WITH BODY MASS INDEX (BMI) OF 39.0 TO 39.9 IN ADULT: Status: RESOLVED | Noted: 2021-02-08 | Resolved: 2022-03-18

## 2022-03-18 PROBLEM — E66.09 CLASS 2 OBESITY DUE TO EXCESS CALORIES WITHOUT SERIOUS COMORBIDITY WITH BODY MASS INDEX (BMI) OF 39.0 TO 39.9 IN ADULT: Status: RESOLVED | Noted: 2021-02-08 | Resolved: 2022-03-18

## 2022-03-18 PROCEDURE — 99213 OFFICE O/P EST LOW 20 MIN: CPT | Performed by: SURGERY

## 2022-03-18 NOTE — PROGRESS NOTES
Progress Note - Bariatric Surgery   Yadira Mckee 40 y o  female MRN: [id number]  Unit/Bed#:  Encounter: [id number]    Assessment/Plan:  46/F 3 months post-op s/p LRYGB; EWL 57%    Mindful eating, stress reduction, sleep hygiene   Exercise  MVI/minerals  Obtain metabolic panel and RTO in 3 months       Subjective/Objective     Subjective: Doing well  Will increase exercise  Adequate protein/H20  +MVI/minerals  Denies NSAIDs  Migraines have improved  Review of systems: Negative except as noted above    Objective:     Blood pressure 106/70, pulse 59, height 5' 1 7" (1 567 m), weight 82 1 kg (181 lb)  ,Body mass index is 33 43 kg/m²  Invasive Devices  Report    None                 Physical Exam:     No distress   EOMI   CN II-XII grossly intact  Neck ROM wnl   RRR  Breathing non labored   Abd flat, ND  Skin warm/dry  Msk ROM wnl   Thought content/behavior/mood wnl               Lab, Imaging and other studies:I have personally reviewed pertinent lab results

## 2022-05-03 DIAGNOSIS — Z12.31 SCREENING MAMMOGRAM, ENCOUNTER FOR: Primary | ICD-10-CM

## 2022-05-17 ENCOUNTER — OFFICE VISIT (OUTPATIENT)
Dept: FAMILY MEDICINE CLINIC | Facility: CLINIC | Age: 47
End: 2022-05-17
Payer: COMMERCIAL

## 2022-05-17 VITALS
DIASTOLIC BLOOD PRESSURE: 58 MMHG | HEIGHT: 62 IN | RESPIRATION RATE: 16 BRPM | HEART RATE: 52 BPM | BODY MASS INDEX: 31.83 KG/M2 | TEMPERATURE: 97.5 F | SYSTOLIC BLOOD PRESSURE: 94 MMHG | WEIGHT: 173 LBS

## 2022-05-17 DIAGNOSIS — J06.9 ACUTE UPPER RESPIRATORY INFECTION: Primary | ICD-10-CM

## 2022-05-17 PROCEDURE — 99213 OFFICE O/P EST LOW 20 MIN: CPT | Performed by: NURSE PRACTITIONER

## 2022-05-17 NOTE — PROGRESS NOTES
Assessment/Plan:    Reviewed symptomatic treatment of viral illness  To start Flonase and cont Mucinex  Offered Covid/flu PCR, she will defer until she has the results of her kids' tests  1  Acute upper respiratory infection          Patient Instructions   Drinking plenty of fluids, saline nasal rinses or nasal spray, and steam or cool air humidification are all effective ways of managing symptoms  You may take Mucinex D for sinus congestion, or Coricidin HBP if you have a heart condition or high blood pressure  Mucinex or Robitussin DM are used to control cough symptoms and include an expectorant  You may take Ibuprofen or Tylenol as needed for pain or fevers  Recommend recheck if symptoms do not resolve or improve within 1 week  No follow-ups on file  Subjective:      Patient ID: Quinten Law is a 40 y o  female  Chief Complaint   Patient presents with    Cough    Sore Throat     S/S started 3 days ago Rubi LOVE       Symptom onset 5/14 with sore throat  She has a lot of congestion, and ear pressure  Cough is productive  Denies any fevers, chills, and body aches  No breathing difficulties  Took a Covid test this morning which was negative  Kids have been sick with similar symptoms, awaiting Covid/flu PCR tests which were done yesterday  The following portions of the patient's history were reviewed and updated as appropriate: allergies, current medications, past family history, past medical history, past social history, past surgical history and problem list     Review of Systems   Constitutional: Positive for fatigue  Negative for chills and fever  HENT: Positive for congestion, ear pain and sore throat  Negative for postnasal drip, rhinorrhea and sinus pressure  Respiratory: Positive for cough  Negative for shortness of breath and wheezing  Cardiovascular: Negative for chest pain  Gastrointestinal: Negative for abdominal pain, diarrhea, nausea and vomiting  Musculoskeletal: Negative for arthralgias  Skin: Negative for rash  Neurological: Negative for headaches  Current Outpatient Medications   Medication Sig Dispense Refill    Calcium-Vitamin A-Vitamin D (LIQUID CALCIUM PO) Take 1,500 mg by mouth daily      cholecalciferol (VITAMIN D3) 1,000 units tablet Take 2,000 Units by mouth daily      escitalopram (LEXAPRO) 10 mg tablet Take 1 tablet (10 mg total) by mouth daily 90 tablet 1    fluticasone (FLONASE) 50 mcg/act nasal spray 2 sprays into each nostril daily (Patient taking differently: 2 sprays into each nostril as needed) 16 g 3    levonorgestrel (MIRENA) 20 MCG/24HR IUD 1 each by Intrauterine route once      multivitamin (THERAGRAN) TABS Take 1 tablet by mouth daily      omeprazole (PriLOSEC) 20 mg delayed release capsule Take 1 capsule (20 mg total) by mouth daily 90 capsule 1     No current facility-administered medications for this visit  Objective:    BP 94/58   Pulse (!) 52   Temp 97 5 °F (36 4 °C)   Resp 16   Ht 5' 1 75" (1 568 m)   Wt 78 5 kg (173 lb)   BMI 31 90 kg/m²        Physical Exam  Vitals and nursing note reviewed  Constitutional:       Appearance: Normal appearance  She is well-developed  HENT:      Right Ear: Tympanic membrane normal       Left Ear: Tympanic membrane normal       Nose: Congestion present  Mouth/Throat:      Pharynx: Oropharynx is clear  No oropharyngeal exudate or posterior oropharyngeal erythema  Eyes:      Conjunctiva/sclera: Conjunctivae normal    Cardiovascular:      Rate and Rhythm: Normal rate and regular rhythm  Pulses: Normal pulses  Heart sounds: Normal heart sounds  No murmur heard  Pulmonary:      Effort: Pulmonary effort is normal       Breath sounds: Normal breath sounds  Lymphadenopathy:      Cervical: No cervical adenopathy  Skin:     General: Skin is warm and dry  Neurological:      Mental Status: She is alert     Psychiatric:         Mood and Affect: Mood normal          Behavior: Behavior normal                 YESY Dover

## 2022-05-17 NOTE — LETTER
May 17, 2022     Patient: Mack Talley  YOB: 1975  Date of Visit: 5/17/2022      To Whom it May Concern:    Mack Talley is under my professional care  Ceci Adkins was seen in my office on 5/17/2022  Please excuse from work 5/16/22-5/18/22  If you have any questions or concerns, please don't hesitate to call           Sincerely,          YESY Moyer        CC: No Recipients

## 2022-06-07 ENCOUNTER — HOSPITAL ENCOUNTER (OUTPATIENT)
Dept: RADIOLOGY | Facility: HOSPITAL | Age: 47
Discharge: HOME | End: 2022-06-07
Payer: COMMERCIAL

## 2022-06-07 VITALS — BODY MASS INDEX: 30.36 KG/M2 | HEIGHT: 62 IN | WEIGHT: 165 LBS

## 2022-06-07 DIAGNOSIS — Z12.31 SCREENING MAMMOGRAM, ENCOUNTER FOR: ICD-10-CM

## 2022-06-07 PROCEDURE — 77067 SCR MAMMO BI INCL CAD: CPT

## 2022-06-07 PROCEDURE — 77063 BREAST TOMOSYNTHESIS BI: CPT

## 2022-06-09 ENCOUNTER — TELEPHONE (OUTPATIENT)
Dept: FAMILY MEDICINE CLINIC | Facility: CLINIC | Age: 47
End: 2022-06-09

## 2022-06-09 NOTE — TELEPHONE ENCOUNTER
6/9/2022 11:31 AM [id number] regarding her abnormal mammogram     Reviewed results with patient  She agreed to get the follow up studies done      Marylou Montana DO

## 2022-06-10 ENCOUNTER — OFFICE VISIT (OUTPATIENT)
Dept: FAMILY MEDICINE CLINIC | Facility: CLINIC | Age: 47
End: 2022-06-10
Payer: COMMERCIAL

## 2022-06-10 VITALS
HEART RATE: 61 BPM | BODY MASS INDEX: 30.55 KG/M2 | WEIGHT: 166 LBS | TEMPERATURE: 97.4 F | OXYGEN SATURATION: 99 % | SYSTOLIC BLOOD PRESSURE: 110 MMHG | RESPIRATION RATE: 18 BRPM | HEIGHT: 62 IN | DIASTOLIC BLOOD PRESSURE: 70 MMHG

## 2022-06-10 DIAGNOSIS — Z00.00 ANNUAL PHYSICAL EXAM: Primary | ICD-10-CM

## 2022-06-10 DIAGNOSIS — F41.9 ANXIETY: ICD-10-CM

## 2022-06-10 DIAGNOSIS — Z12.11 COLON CANCER SCREENING: ICD-10-CM

## 2022-06-10 PROCEDURE — 99396 PREV VISIT EST AGE 40-64: CPT | Performed by: FAMILY MEDICINE

## 2022-06-10 NOTE — PROGRESS NOTES
[id number] [facility]    NAME: Quinten Law  AGE: 40 y o  SEX: female  : 1975     DATE: 6/10/2022     Assessment and Plan:     Problem List Items Addressed This Visit     Anxiety     Doing very well   Continue lexapro 10 mg a day             Other Visit Diagnoses     Annual physical exam    -  Primary    Colon cancer screening        Relevant Orders    Ambulatory referral for colonoscopy          Immunizations and preventive care screenings were discussed with patient today  Appropriate education was printed on patient's after visit summary  Counseling:  Dental Health: discussed importance of regular tooth brushing, flossing, and dental visits  Exercise: the importance of regular exercise/physical activity was discussed  Recommend exercise 3-5 times per week for at least 30 minutes  Return in 6 months (on 12/10/2022)  Chief Complaint:     Chief Complaint   Patient presents with    Physical Exam     [id number]      History of Present Illness:     Adult Annual Physical   Patient here for a comprehensive physical exam  The patient reports anxiety is well controlled  Her father just passed and it was rough, but is she is doing better  She is feeling well  She is going go to [place] for a family vacation  Diet and Physical Activity  Diet/Nutrition: well balanced diet  Exercise: walking  Depression Screening  PHQ-2/9 Depression Screening    Little interest or pleasure in doing things: 0 - not at all  Feeling down, depressed, or hopeless: 0 - not at all  PHQ-2 Score: 0  PHQ-2 Interpretation: Negative depression screen       General Health  Sleep: sleeps well  Hearing: decreased - right  Vision: wears glasses  Dental: no dental visits for >1 year  /GYN Health  Patient is: has IUD  Last menstrual period:   Contraceptive method: IUD placement       Review of Systems:     Review of Systems   Constitutional: Negative  Respiratory: Negative  Cardiovascular: Negative  Past Medical History:     Past Medical History:   Diagnosis Date    COVID-19 vaccine series completed     Depression     Fractures 10/1993    5th metatarsal rt    Hyperlipidemia     recent dx 8/21    Morbid obesity with BMI of 40 0-44 9, adult ([facility] [address] )     EGD in preparation for gastric bypass surgery    Wears glasses       Past Surgical History:     Past Surgical History:   Procedure Laterality Date    BREAST BIOPSY Right     benign    CHOLECYSTECTOMY  02/19/2015    [employer]    FERTILITY SURGERY      LASER ABLATION  09/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    MO LAP GASTRIC BYPASS/MIGUEL-EN-Y N/A 11/29/2021    Procedure: LAPAROSCOPIC MIGUEL-EN-Y GASTRIC BYPASS AND INTRAOPERATIVE EGD;  Surgeon: Pato Hopkins MD;  Location: [facility];  Service: Berto Minus WISDOM TOOTH EXTRACTION        Social History:     Social History     Socioeconomic History    Marital status: /Civil Union     Spouse name: None    Number of children: None    Years of education: None    Highest education level: None   Occupational History    None   Tobacco Use    Smoking status: Never Smoker    Smokeless tobacco: Never Used   Vaping Use    Vaping Use: Never used   Substance and Sexual Activity    Alcohol use: Not Currently     Alcohol/week: 0 0 standard drinks     Comment: Very rare, on special occasions    Drug use: No    Sexual activity: Yes     Partners: Male     Birth control/protection: I U D       Comment: Mirena since 2009   Other Topics Concern    None   Social History Narrative    None     Social Determinants of Health     Financial Resource Strain: Not on file   Food Insecurity: Not on file   Transportation Needs: Not on file   Physical Activity: Not on file   Stress: Not on file   Social Connections: Not on file   Intimate Partner Violence: Not on file   Housing Stability: Not on file      Family History: Family History   Problem Relation Age of Onset    Vision loss Mother    [place] Breast cancer Mother 58    Cancer Mother         Breast, very early stage, tumor removed    Osteoporosis Mother         On Prolia injections    Stroke Father     Diabetes Father         type 2    Asthma Sister     Melanoma Maternal Grandmother     Cancer Maternal Grandmother         Metastic Skin Cancer      Hepatitis Maternal Aunt     Learning disabilities Brother         Passed in     ADD / ADHD Daughter     Learning disabilities Brother         [de-identified] brother lives in [place] Scoliosis Sister         Spondyloliyhesis      Current Medications:     Current Outpatient Medications   Medication Sig Dispense Refill    Calcium-Vitamin A-Vitamin D (LIQUID CALCIUM PO) Take 1,500 mg by mouth daily      cholecalciferol (VITAMIN D3) 1,000 units tablet Take 2,000 Units by mouth daily      escitalopram (LEXAPRO) 10 mg tablet Take 1 tablet (10 mg total) by mouth daily 90 tablet 1    fluticasone (FLONASE) 50 mcg/act nasal spray 2 sprays into each nostril daily (Patient taking differently: 2 sprays into each nostril as needed) 16 g 3    levonorgestrel (MIRENA) 20 MCG/24HR IUD 1 each by Intrauterine route once      multivitamin (THERAGRAN) TABS Take 1 tablet by mouth daily      omeprazole (PriLOSEC) 20 mg delayed release capsule Take 1 capsule (20 mg total) by mouth daily 90 capsule 1     No current facility-administered medications for this visit  Allergies:     No Known Allergies   Physical Exam:     /70   Pulse 61   Temp (!) 97 4 °F (36 3 °C)   Resp 18   Ht 5' 1 75" (1 568 m)   Wt 75 3 kg (166 lb)   SpO2 99%   BMI 30 61 kg/m²     Physical Exam  Vitals and nursing note reviewed  Constitutional:       Appearance: She is well-developed  HENT:      Head: Normocephalic and atraumatic        Right Ear: External ear normal       Left Ear: External ear normal       Nose: Nose normal    Cardiovascular:      Rate and Rhythm: Normal rate and regular rhythm  Heart sounds: Normal heart sounds  No murmur heard  No friction rub  Pulmonary:      Effort: No respiratory distress  Breath sounds: Normal breath sounds  No wheezing or rales  Abdominal:      Palpations: Abdomen is soft  Tenderness: There is no abdominal tenderness  Musculoskeletal:      Right lower leg: No edema  Left lower leg: No edema  Neurological:      Mental Status: She is oriented to person, place, and time  Cranial Nerves: No cranial nerve deficit            [facility], [address]

## 2022-06-10 NOTE — PATIENT INSTRUCTIONS

## 2022-06-17 ENCOUNTER — HOSPITAL ENCOUNTER (OUTPATIENT)
Dept: RADIOLOGY | Facility: HOSPITAL | Age: 47
Discharge: HOME | End: 2022-06-17
Payer: COMMERCIAL

## 2022-06-17 ENCOUNTER — APPOINTMENT (OUTPATIENT)
Dept: LAB | Facility: HOSPITAL | Age: 47
End: 2022-06-17
Payer: COMMERCIAL

## 2022-06-17 DIAGNOSIS — R92.8 ABNORMAL SCREENING MAMMOGRAM: ICD-10-CM

## 2022-06-17 DIAGNOSIS — K91.2 POSTSURGICAL MALABSORPTION: ICD-10-CM

## 2022-06-17 LAB
25(OH)D3 SERPL-MCNC: 55.2 NG/ML (ref 30–100)
ALBUMIN SERPL BCP-MCNC: 3.6 G/DL (ref 3.5–5)
ALP SERPL-CCNC: 80 U/L (ref 46–116)
ALT SERPL W P-5'-P-CCNC: 26 U/L (ref 12–78)
ANION GAP SERPL CALCULATED.3IONS-SCNC: 7 MMOL/L (ref 4–13)
AST SERPL W P-5'-P-CCNC: 14 U/L (ref 5–45)
BILIRUB SERPL-MCNC: 0.46 MG/DL (ref 0.2–1)
BUN SERPL-MCNC: 11 MG/DL (ref 5–25)
CALCIUM SERPL-MCNC: 8.9 MG/DL (ref 8.3–10.1)
CHLORIDE SERPL-SCNC: 108 MMOL/L (ref 100–108)
CHOLEST SERPL-MCNC: 153 MG/DL
CO2 SERPL-SCNC: 28 MMOL/L (ref 21–32)
CREAT SERPL-MCNC: 0.68 MG/DL (ref 0.6–1.3)
ERYTHROCYTE [DISTWIDTH] IN BLOOD BY AUTOMATED COUNT: 13.2 % (ref 11.6–15.1)
EST. AVERAGE GLUCOSE BLD GHB EST-MCNC: 91 MG/DL
FERRITIN SERPL-MCNC: 117 NG/ML (ref 8–388)
FOLATE SERPL-MCNC: >20 NG/ML (ref 3.1–17.5)
GFR SERPL CREATININE-BSD FRML MDRD: 105 ML/MIN/1.73SQ M
GLUCOSE P FAST SERPL-MCNC: 82 MG/DL (ref 65–99)
HBA1C MFR BLD: 4.8 %
HCT VFR BLD AUTO: 42 % (ref 34.8–46.1)
HDLC SERPL-MCNC: 59 MG/DL
HGB BLD-MCNC: 13.8 G/DL (ref 11.5–15.4)
IRON SATN MFR SERPL: 29 % (ref 15–50)
IRON SERPL-MCNC: 78 UG/DL (ref 50–170)
LDLC SERPL CALC-MCNC: 77 MG/DL (ref 0–100)
MCH RBC QN AUTO: 31.8 PG (ref 26.8–34.3)
MCHC RBC AUTO-ENTMCNC: 32.9 G/DL (ref 31.4–37.4)
MCV RBC AUTO: 97 FL (ref 82–98)
PLATELET # BLD AUTO: 263 THOUSANDS/UL (ref 149–390)
PMV BLD AUTO: 11.1 FL (ref 8.9–12.7)
POTASSIUM SERPL-SCNC: 4.4 MMOL/L (ref 3.5–5.3)
PROT SERPL-MCNC: 6.4 G/DL (ref 6.4–8.2)
PTH-INTACT SERPL-MCNC: 55.9 PG/ML (ref 18.4–80.1)
RBC # BLD AUTO: 4.34 MILLION/UL (ref 3.81–5.12)
SODIUM SERPL-SCNC: 143 MMOL/L (ref 136–145)
TIBC SERPL-MCNC: 273 UG/DL (ref 250–450)
TRIGL SERPL-MCNC: 84 MG/DL
VIT B12 SERPL-MCNC: 639 PG/ML (ref 100–900)
WBC # BLD AUTO: 7.08 THOUSAND/UL (ref 4.31–10.16)

## 2022-06-17 PROCEDURE — 82746 ASSAY OF FOLIC ACID SERUM: CPT

## 2022-06-17 PROCEDURE — 83540 ASSAY OF IRON: CPT

## 2022-06-17 PROCEDURE — 85027 COMPLETE CBC AUTOMATED: CPT

## 2022-06-17 PROCEDURE — 84590 ASSAY OF VITAMIN A: CPT

## 2022-06-17 PROCEDURE — 83036 HEMOGLOBIN GLYCOSYLATED A1C: CPT

## 2022-06-17 PROCEDURE — 84630 ASSAY OF ZINC: CPT

## 2022-06-17 PROCEDURE — 80053 COMPREHEN METABOLIC PANEL: CPT

## 2022-06-17 PROCEDURE — G0279 TOMOSYNTHESIS, MAMMO: HCPCS

## 2022-06-17 PROCEDURE — 83550 IRON BINDING TEST: CPT

## 2022-06-17 PROCEDURE — 80061 LIPID PANEL: CPT

## 2022-06-17 PROCEDURE — 83970 ASSAY OF PARATHORMONE: CPT

## 2022-06-17 PROCEDURE — 77065 DX MAMMO INCL CAD UNI: CPT

## 2022-06-17 PROCEDURE — 82306 VITAMIN D 25 HYDROXY: CPT

## 2022-06-17 PROCEDURE — 36415 COLL VENOUS BLD VENIPUNCTURE: CPT

## 2022-06-17 PROCEDURE — 76642 ULTRASOUND BREAST LIMITED: CPT

## 2022-06-17 PROCEDURE — 84425 ASSAY OF VITAMIN B-1: CPT

## 2022-06-17 PROCEDURE — 82607 VITAMIN B-12: CPT

## 2022-06-17 PROCEDURE — 82728 ASSAY OF FERRITIN: CPT

## 2022-06-20 NOTE — ASSESSMENT & PLAN NOTE
-At risk for malabsorption of vitamins/minerals secondary to malabsorption and restriction of intake from bariatric surgery  -Currently taking adequate postop bariatric surgery vitamin supplementation: Bariatric MVI w/ 45mg, calcium citrate 500mg TID  -Labs 06/17/22: Vitamins WNL    -Repeat CBC/Metabolic panel  -Patient received education about the importance of adhering to a lifelong supplementation regimen to avoid vitamin/mineral deficiencies

## 2022-06-20 NOTE — ASSESSMENT & PLAN NOTE
-Avoid fried foods and trans fat, limit saturated fats and refined carbohydrates  -Increase fish/omega 3 FA consumption  -Increase physical activity  -Lipids greatly improved and normalized on most recent labs

## 2022-06-20 NOTE — ASSESSMENT & PLAN NOTE
-s/p Sachin-En-Y Gastric Bypass with Dr Dari Cerda on 11/29/21  Overall doing Well  EWL on schedule for expected post surgical weight loss at this time  Initial: 241 2lbs  Current: 168lbs  EWL: 69%  Gama: current  Current BMI is Body mass index is 31 03 kg/m²  · Tolerating a regular diet-yes  · Eating at least 60 grams of protein per day-yes  · Following 30/60 minute rule with liquids-yes  · Drinking at least 64 ounces of fluid per day-no; advised her to increase to goal  · Drinking carbonated beverages-no  · Sufficient exercise-yes  · Using NSAIDs regularly-no  · Using nicotine-no  · Using alcohol-no   Advised about the risks of alcohol s/p bariatric surgery and recommend avoiding all alcohol

## 2022-06-21 DIAGNOSIS — R92.8 ABNORMAL MAMMOGRAM: Primary | ICD-10-CM

## 2022-06-22 LAB
VIT B1 BLD-SCNC: 196.3 NMOL/L (ref 66.5–200)
ZINC SERPL-MCNC: 61 UG/DL (ref 44–115)

## 2022-06-24 ENCOUNTER — OFFICE VISIT (OUTPATIENT)
Dept: BARIATRICS | Facility: CLINIC | Age: 47
End: 2022-06-24
Payer: COMMERCIAL

## 2022-06-24 VITALS
BODY MASS INDEX: 30.91 KG/M2 | HEIGHT: 62 IN | WEIGHT: 168 LBS | HEART RATE: 52 BPM | SYSTOLIC BLOOD PRESSURE: 94 MMHG | DIASTOLIC BLOOD PRESSURE: 60 MMHG

## 2022-06-24 DIAGNOSIS — Z12.11 SCREENING FOR COLON CANCER: ICD-10-CM

## 2022-06-24 DIAGNOSIS — K91.2 POSTSURGICAL MALABSORPTION: ICD-10-CM

## 2022-06-24 DIAGNOSIS — E78.2 MIXED HYPERLIPIDEMIA: ICD-10-CM

## 2022-06-24 DIAGNOSIS — Z48.815 ENCOUNTER FOR SURGICAL AFTERCARE FOLLOWING SURGERY OF DIGESTIVE SYSTEM: Primary | ICD-10-CM

## 2022-06-24 DIAGNOSIS — E66.9 OBESITY, CLASS I, BMI 30-34.9: ICD-10-CM

## 2022-06-24 PROCEDURE — 99214 OFFICE O/P EST MOD 30 MIN: CPT | Performed by: PHYSICIAN ASSISTANT

## 2022-06-24 NOTE — PATIENT INSTRUCTIONS
GOALS:   Continued/Maintain healthy weight loss with good nutrition intakes  Adequate hydration with at least 64oz  fluid intake  Normal vitamin and mineral levels  Exercise as tolerated  Slowly taper off the omeprazole   Increase fluids    Follow-up in 6 months  We kindly ask that your arrive 15 minutes before your scheduled appointment time with your provider to allow our staff to room you, get your vital signs and update your chart  Follow diet as discussed  Get lab work done in the next 2 weeks  You have been given a lab slip today  Please call the office if you need a replacement  It is recommended to check with your insurance BEFORE getting labs done to make sure they are covered by your policy  Also, please check with your PCP and other providers before getting labs to avoid duplicate labs  Make sure to HOLD any multivitamins that may contain biotin and any biotin supplements FOR 5 DAYS before any labs since it can affect the results  Follow vitamin and mineral recommendations as reviewed with you  Call our office if you have any problems with abdominal pain especially associated with fever, chills, nausea, vomiting or any other concerns  All  Post-bariatric surgery patients should be aware that very small quantities of any alcohol can cause impairment and it is very possible not to feel the effect  The effect can be in the system for several hours  It is also a stomach irritant  It is advised to AVOID alcohol, Nonsteroidal antiinflammatory drugs (NSAIDS) and nicotine of all forms   Any of these can cause stomach irritation/pain

## 2022-06-24 NOTE — PROGRESS NOTES
Assessment/Plan:    Encounter for surgical aftercare following surgery of digestive system  -s/p Sachin-En-Y Gastric Bypass with Dr Corazon Doll on 11/29/21  Overall doing Well  EWL on schedule for expected post surgical weight loss at this time  Initial: 241 2lbs  Current: 168lbs  EWL: 69%  Gama: current  Current BMI is Body mass index is 31 03 kg/m²  · Tolerating a regular diet-yes  · Eating at least 60 grams of protein per day-yes  · Following 30/60 minute rule with liquids-yes  · Drinking at least 64 ounces of fluid per day-no; advised her to increase to goal  · Drinking carbonated beverages-no  · Sufficient exercise-yes  · Using NSAIDs regularly-no  · Using nicotine-no  · Using alcohol-no  Advised about the risks of alcohol s/p bariatric surgery and recommend avoiding all alcohol      Postsurgical malabsorption  -At risk for malabsorption of vitamins/minerals secondary to malabsorption and restriction of intake from bariatric surgery  -Currently taking adequate postop bariatric surgery vitamin supplementation: Bariatric MVI w/ 45mg, calcium citrate 500mg TID  -Labs 06/17/22: Vitamins WNL    -Repeat CBC/Metabolic panel  -Patient received education about the importance of adhering to a lifelong supplementation regimen to avoid vitamin/mineral deficiencies     Mixed hyperlipidemia  -Avoid fried foods and trans fat, limit saturated fats and refined carbohydrates  -Increase fish/omega 3 FA consumption  -Increase physical activity  -Lipids greatly improved and normalized on most recent labs       Diagnoses and all orders for this visit:    Encounter for surgical aftercare following surgery of digestive system    Postsurgical malabsorption  -     CBC and differential; Future  -     Comprehensive metabolic panel; Future  -     Folate; Future  -     Hemoglobin A1C; Future  -     Iron Panel (Includes Ferritin, Iron Sat%, Iron, and TIBC); Future  -     Zinc; Future  -     Vitamin D 25 hydroxy;  Future  -     Vitamin B12; Future  -     Vitamin B1, whole blood; Future  -     Vitamin A; Future  -     PTH, intact; Future  -     Lipid panel; Future    Mixed hyperlipidemia  -     Lipid panel; Future    Screening for colon cancer  -     Ambulatory referral to Gastroenterology; Future    Obesity, Class I, BMI 30-34 9  -     Hemoglobin A1C; Future  -     Lipid panel; Future          Subjective:      Patient ID: Francoise May is a 40 y o  female  -s/p Sachin-En-Y Gastric Bypass with Dr Corazon Doll on 11/29/21  Presents to the office today for routine follow up  Tolerating diet without issues; denies N/V, dysphagia, reflux  Overall doing very Well  Was slightly off track last 2 weeks d/t death of stepfather  Mother Chong Perez lives in [place] [address]  Frustrated that  is over 300lbs and wishes he would get surgery as well  The following portions of the patient's history were reviewed and updated as appropriate: allergies, current medications, past family history, past medical history, past social history, past surgical history and problem list     Review of Systems   Constitutional: Negative for chills and fever  Unexpected weight change: planned weight loss  HENT: Negative for trouble swallowing  Respiratory: Negative for cough and shortness of breath  Cardiovascular: Negative for chest pain and palpitations  Gastrointestinal: Negative for abdominal pain, constipation, diarrhea, nausea and vomiting  Neurological: Negative for dizziness  Psychiatric/Behavioral:        Denies anxiety and depression         Objective:      BP 94/60 (BP Location: Right arm, Patient Position: Sitting, Cuff Size: Standard)   Pulse (!) 52   Ht 5' 1 7" (1 567 m)   Wt 76 2 kg (168 lb)   BMI 31 03 kg/m²     Colonoscopy-referral placed to GI       Physical Exam  Vitals reviewed  Constitutional:       General: She is not in acute distress  Appearance: She is well-developed  HENT:      Head: Normocephalic and atraumatic     Eyes:      General: No scleral icterus  Cardiovascular:      Rate and Rhythm: Normal rate and regular rhythm  Pulmonary:      Effort: Pulmonary effort is normal  No respiratory distress  Abdominal:      General: There is no distension  Palpations: Abdomen is soft  Tenderness: There is no abdominal tenderness  Comments: No incisional hernias appreciated   Skin:     General: Skin is warm and dry  Neurological:      Mental Status: She is alert and oriented to person, place, and time  Psychiatric:         Mood and Affect: Mood normal          Behavior: Behavior normal            BARRIERS: none identified    GOALS:   · Continued/Maintain healthy weight loss with good nutrition intakes  · Adequate hydration with at least 64oz  fluid intake  · Normal vitamin and mineral levels  · Exercise as tolerated  · Slowly taper off the omeprazole   · Increase fluids    · Follow-up in 6 months  We kindly ask that your arrive 15 minutes before your scheduled appointment time with your provider to allow our staff to room you, get your vital signs and update your chart  · Follow diet as discussed  · Get lab work done in the next 2 weeks  You have been given a lab slip today  Please call the office if you need a replacement  It is recommended to check with your insurance BEFORE getting labs done to make sure they are covered by your policy  Also, please check with your PCP and other providers before getting labs to avoid duplicate labs  Make sure to HOLD any multivitamins that may contain biotin and any biotin supplements FOR 5 DAYS before any labs since it can affect the results  · Follow vitamin and mineral recommendations as reviewed with you  · Call our office if you have any problems with abdominal pain especially associated with fever, chills, nausea, vomiting or any other concerns      · All  Post-bariatric surgery patients should be aware that very small quantities of any alcohol can cause impairment and it is very possible not to feel the effect  The effect can be in the system for several hours  It is also a stomach irritant  · It is advised to AVOID alcohol, Nonsteroidal antiinflammatory drugs (NSAIDS) and nicotine of all forms   Any of these can cause stomach irritation/pain

## 2022-06-26 LAB — VIT A SERPL-MCNC: 22.3 UG/DL (ref 20.1–62)

## 2022-07-07 DIAGNOSIS — F41.9 ANXIETY: ICD-10-CM

## 2022-07-07 RX ORDER — ESCITALOPRAM OXALATE 10 MG/1
10 TABLET ORAL DAILY
Qty: 90 TABLET | Refills: 0 | Status: SHIPPED | OUTPATIENT
Start: 2022-07-07

## 2022-10-31 ENCOUNTER — OFFICE VISIT (OUTPATIENT)
Dept: FAMILY MEDICINE CLINIC | Facility: CLINIC | Age: 47
End: 2022-10-31

## 2022-10-31 VITALS
TEMPERATURE: 99.4 F | BODY MASS INDEX: 31.77 KG/M2 | HEART RATE: 55 BPM | OXYGEN SATURATION: 99 % | RESPIRATION RATE: 16 BRPM | WEIGHT: 172 LBS | DIASTOLIC BLOOD PRESSURE: 72 MMHG | SYSTOLIC BLOOD PRESSURE: 112 MMHG

## 2022-10-31 DIAGNOSIS — M54.2 NECK PAIN: Primary | ICD-10-CM

## 2022-10-31 RX ORDER — METHYLPREDNISOLONE 4 MG/1
TABLET ORAL
Qty: 21 TABLET | Refills: 0 | Status: SHIPPED | OUTPATIENT
Start: 2022-10-31

## 2022-10-31 RX ORDER — AMOXICILLIN 875 MG/1
875 TABLET, COATED ORAL 2 TIMES DAILY
Qty: 14 TABLET | Refills: 0 | Status: SHIPPED | OUTPATIENT
Start: 2022-10-31 | End: 2022-11-07

## 2022-10-31 NOTE — PATIENT INSTRUCTIONS
Methylprednisolone (By mouth)   Methylprednisolone (meth-il-pred-NIS-oh-lone)  Treats inflammation, severe allergies, flare-ups of ongoing illnesses, and many other medical problems  May also be used to decrease some symptoms of cancer  This medicine is a corticosteroid  Brand Name(s): Medrol, Medrol Dosepak, Methylpred-DP   There may be other brand names for this medicine  When This Medicine Should Not Be Used: This medicine is not right for everyone  Do not use it if you had an allergic reaction to methylprednisolone, or if you have a fungus infection  How to Use This Medicine:   Tablet  Take your medicine as directed  Your dose may need to be changed several times to find what works best for you  If you are using this medicine for an ongoing illness, your dose may need to be changed occasionally  Some people take this medicine only every other day, which helps to decrease side effects  Take your medicine in the morning, unless your doctor tells you otherwise  It is best to take this medicine with food or milk  Missed dose: Take a dose as soon as you remember  If it is almost time for your next dose, wait until then and take a regular dose  Do not take extra medicine to make up for a missed dose  Store the medicine in a closed container at room temperature, away from heat, moisture, and direct light  Drugs and Foods to Avoid:   Ask your doctor or pharmacist before using any other medicine, including over-the-counter medicines, vitamins, and herbal products  Some medicines can affect how methylprednisolone works  Tell your doctor if you are using any of the following:  Cyclosporine, ketoconazole, phenobarbital, phenytoin, rifampin, troleandomycin  Aspirin, especially in high doses  Blood thinner (including warfarin)  Diabetes medicine  This medicine may interfere with vaccines  Ask your doctor before you get a flu shot or any other vaccines    Warnings While Using This Medicine:   Tell your doctor if you are pregnant or breastfeeding, or if you have kidney disease, liver disease (including cirrhosis), adrenal gland problems, heart failure, high blood pressure, diabetes, osteoporosis, blood clotting problems, thyroid problems, mental health problems (including depression), myasthenia gravis, or stomach or bowel problems (including ulcer or diverticulitis)  Tell your doctor if you have an infection (including herpes eye infection, tuberculosis, or threadworm)  Also tell your doctor if you have a recent exposure to chickenpox or measles  This medicine may cause the following problems:  Increased risk of infection  Changes in mood or behavior  High blood pressure  Adrenal gland problems  Eye problems or changes in vision (including cataracts or glaucoma)  Bone problems (including osteoporosis)  Slow growth in children  Increased risk for cancer (including Kaposi's sarcoma)  If you use this medicine for a long time, tell your doctor about any extra stress or anxiety in your life, including other health concerns and emotional stress  Your dose might need to be changed for a short time while you have extra stress  Do not stop using this medicine suddenly  Your doctor will need to slowly decrease your dose before you stop it completely  Tell any doctor or dentist who treats you that you are using this medicine  This medicine may affect certain medical test results  Your doctor will do lab tests at regular visits to check on the effects of this medicine  Keep all appointments  Keep all medicine out of the reach of children  Never share your medicine with anyone  Possible Side Effects While Using This Medicine:   Call your doctor right away if you notice any of these side effects:   Allergic reaction: Itching or hives, swelling in your face or hands, swelling or tingling in your mouth or throat, chest tightness, trouble breathing  Bone pain, decrease in height  Dark freckles, skin color changes, coldness, weakness, tiredness, weight loss  Depression, unusual thoughts, feelings, or behaviors, trouble sleeping  Fever, chills, cough, sore throat, body aches  Severe stomach pain, nausea, vomiting, or red or black stools  Skin changes or growths  Swelling in your hands, ankles, or feet, rapid weight gain  Trouble seeing, blurred vision or other changes in vision, eye pain, headache  Unusual bleeding or bruising  If you notice these less serious side effects, talk with your doctor: Increased appetite  Round, puffy face  Weight gain around your neck, upper back, breast, face, or waist  If you notice other side effects that you think are caused by this medicine, tell your doctor  Call your doctor for medical advice about side effects  You may report side effects to FDA at 7-352-FDA-4817    © Copyright [employer] 2022 Information is for End User's use only and may not be sold, redistributed or otherwise used for commercial purposes  The above information is an  only  It is not intended as medical advice for individual conditions or treatments  Talk to your doctor, nurse or pharmacist before following any medical regimen to see if it is safe and effective for you  Amoxicillin (By mouth)   Amoxicillin (a-tee-z-DAKOTA-in)  Treats infections or stomach ulcers  This medicine is a penicillin antibiotic  Brand Name(s): Moxatag, Prevpac   There may be other brand names for this medicine  When This Medicine Should Not Be Used: This medicine is not right for everyone  You should not use it if you had an allergic reaction to amoxicillin, any type of penicillin, or a cephalosporin antibiotic  How to Use This Medicine:   Capsule, Liquid, Tablet, Chewable Tablet, Long Acting Tablet  Your doctor will tell you how much medicine to use  Do not use more than directed  Chewable tablet: You must chew the tablet before you swallow it   You may crush the tablet and mix the medicine with a small amount of food to make it easier to swallow  Oral liquid: Shake well just before each use  Measure the oral liquid medicine with a marked measuring spoon, oral syringe, or medicine cup  You may mix the oral liquid with a baby formula, milk, fruit juice, water, ginger ale, or another cold drink  Be sure your child drinks all of the mixture right away  Tablet for suspension: Place the tablet in a small drinking glass, and add 2 teaspoons of water  Do not use any other liquid  Gently stir or swirl the water in the glass until the tablet is completely dissolved  Drink all of this mixture right away  Add more water to the glass and drink all of it to make sure you get all of the medicine  Do not chew or swallow the tablet for suspension  Take all of the medicine in your prescription to clear up your infection, even if you feel better after the first few doses  Take a dose as soon as you remember  If it is almost time for your next dose, wait until then and take a regular dose  Do not take extra medicine to make up for a missed dose  Store the tablets, capsules, and tablets for suspension at room temperature, away from heat, moisture, and direct light  Store the oral liquid in the refrigerator  Do not freeze  Throw away any unused medicine after 14 days  Drugs and Foods to Avoid:   Ask your doctor or pharmacist before using any other medicine, including over-the-counter medicines, vitamins, and herbal products  Some medicines can affect how amoxicillin works  Tell your doctor if you are also using any of the following:   Allopurinol  Probenecid  Birth control pills  A blood thinner  Warnings While Using This Medicine:   Tell your doctor if you are pregnant or breastfeeding, or if you have kidney disease, allergies, or a condition called phenylketonuria (PKU)  Tell your doctor if you are on dialysis  This medicine can cause diarrhea  Call your doctor if the diarrhea becomes severe, does not stop, or is bloody   Do not take any medicine to stop diarrhea until you have talked to your doctor  Diarrhea can occur 2 months or more after you stop taking this medicine  Tell any doctor or dentist who treats you that you are using this medicine  This medicine may affect certain medical test results  Call your doctor if your symptoms do not improve or if they get worse  Use this medicine to treat only the infection your doctor has prescribed it for  Do not use this medicine for any infection or condition that has not been checked by a doctor  This medicine will not treat the flu or the common cold  Keep all medicine out of the reach of children  Never share your medicine with anyone  Possible Side Effects While Using This Medicine:   Call your doctor right away if you notice any of these side effects: Allergic reaction: Itching or hives, swelling in your face or hands, swelling or tingling in your mouth or throat, chest tightness, trouble breathing  Blistering, peeling, or red skin rash  Diarrhea that may contain blood, stomach cramps, fever  If you notice these less serious side effects, talk with your doctor:   Mild diarrhea, nausea, or vomiting  Mild skin rash  If you notice other side effects that you think are caused by this medicine, tell your doctor  Call your doctor for medical advice about side effects  You may report side effects to FDA at 1-904-FDA-1081    © Copyright [employer] [facility] 2022 Information is for End User's use only and may not be sold, redistributed or otherwise used for commercial purposes  The above information is an  only  It is not intended as medical advice for individual conditions or treatments  Talk to your doctor, nurse or pharmacist before following any medical regimen to see if it is safe and effective for you

## 2022-10-31 NOTE — PROGRESS NOTES
Assessment/Plan:    1  Neck pain  Comments:  will take steroid pack and amoxicillin and if no improvement then patient counseled to call back and will consider ultrasound of neck area of concern  Orders:  -     amoxicillin (AMOXIL) 875 mg tablet; Take 1 tablet (875 mg total) by mouth 2 (two) times a day for 7 days  -     methylPREDNISolone 4 MG tablet therapy pack; Use as directed on package          Patient Instructions: Take prednisone with food in morning and do not take any NSAID's while taking prednisone  Take medication with food  It is important that you take the entire course of antibiotics prescribed  May also take a probiotic of your choice to maintain healthy GI sami  Can take some probiotic and yogurt with the medication  Supportive care discussed and advised  Advised to RTO for any worsening and no improvement  Follow up for no improvement and worsening of conditions  Patient advised and educated when to see immediate medical care  Return if symptoms worsen or fail to improve  Future Appointments   Date Time Provider Lauren Guadarrama   11/29/2022 10:30 AM Norman Hills MD WGT MGT [facility]   12/13/2022  8:15 AM DO EDGAR Dow [facility]   12/21/2022 10:30 AM WA MICHAEL [address]   12/21/2022 11:00 AM HECTOR [address]           Subjective:      Patient ID: Cristal Jiang is a 40 y o  female      Chief Complaint   Patient presents with   • Lymphadenopathy     SX 1 to 1 5 weeks ago, neck pain, Left side by ear            sas/cma   • Neck Pain         Vitals:  /72   Pulse 55   Temp 99 4 °F (37 4 °C)   Resp 16   Wt 78 kg (172 lb)   SpO2 99%   BMI 31 77 kg/m²   Wt Readings from Last 3 Encounters:   10/31/22 78 kg (172 lb)   06/24/22 76 2 kg (168 lb)   06/10/22 75 3 kg (166 lb)      HPI  Patient stated that feels that her left side of neck lymph nodes been swollen from about a week and half and now progressed to neck pain of that side and feeling fatigue  Denies fever, chills, headache  At times felt dizzy                  The following portions of the patient's history were reviewed and updated as appropriate: allergies, current medications, past family history, past medical history, past social history, past surgical history and problem list       Review of Systems   Constitutional: Positive for fatigue  Negative for chills, diaphoresis, fever and unexpected weight change  HENT: Negative for congestion, dental problem, drooling, ear discharge, ear pain, facial swelling, hearing loss, mouth sores, nosebleeds, postnasal drip, rhinorrhea, sinus pressure, sinus pain, sneezing, sore throat, tinnitus, trouble swallowing and voice change  Respiratory: Negative for cough, chest tightness, shortness of breath and wheezing  Cardiovascular: Negative  Gastrointestinal: Negative for abdominal pain, constipation, diarrhea, nausea and vomiting  Musculoskeletal: Positive for neck pain  Skin: Negative  Neurological: Negative for dizziness, light-headedness and headaches           Objective:    Social History     Tobacco Use   Smoking Status Never Smoker   Smokeless Tobacco Never Used       Allergies: No Known Allergies      Current Outpatient Medications   Medication Sig Dispense Refill   • amoxicillin (AMOXIL) 875 mg tablet Take 1 tablet (875 mg total) by mouth 2 (two) times a day for 7 days 14 tablet 0   • Calcium-Vitamin A-Vitamin D (LIQUID CALCIUM PO) Take 1,500 mg by mouth daily     • cholecalciferol (VITAMIN D3) 1,000 units tablet Take 2,000 Units by mouth daily     • escitalopram (LEXAPRO) 10 mg tablet Take 1 tablet (10 mg total) by mouth daily 90 tablet 0   • fluticasone (FLONASE) 50 mcg/act nasal spray 2 sprays into each nostril daily (Patient taking differently: 2 sprays into each nostril as needed) 16 g 3   • levonorgestrel (MIRENA) 20 MCG/24HR IUD 1 each by Intrauterine route once     • methylPREDNISolone 4 MG tablet therapy pack Use as directed on package 21 tablet 0   • multivitamin (THERAGRAN) TABS Take 1 tablet by mouth daily     • omeprazole (PriLOSEC) 20 mg delayed release capsule Take 1 capsule (20 mg total) by mouth daily (Patient not taking: Reported on 10/31/2022) 90 capsule 1     No current facility-administered medications for this visit  Physical Exam  Vitals reviewed  Constitutional:       Appearance: Normal appearance  She is well-developed  HENT:      Head: Normocephalic  Right Ear: Tympanic membrane, ear canal and external ear normal       Left Ear: Tympanic membrane, ear canal and external ear normal       Nose: Nose normal       Right Sinus: No maxillary sinus tenderness or frontal sinus tenderness  Left Sinus: No maxillary sinus tenderness or frontal sinus tenderness  Mouth/Throat:      Mouth: No oral lesions  Pharynx: No oropharyngeal exudate or posterior oropharyngeal erythema  Neck:     Cardiovascular:      Rate and Rhythm: Normal rate and regular rhythm  Heart sounds: Normal heart sounds  Pulmonary:      Effort: Pulmonary effort is normal       Breath sounds: Normal breath sounds  Musculoskeletal:         General: Normal range of motion  Cervical back: Neck supple  Lymphadenopathy:      Cervical:      Right cervical: No superficial or posterior cervical adenopathy  Left cervical: No superficial or posterior cervical adenopathy  Skin:     General: Skin is warm and dry  Neurological:      General: No focal deficit present  Mental Status: She is alert and oriented to person, place, and time  GCS: GCS eye subscore is 4  GCS verbal subscore is 5  GCS motor subscore is 6  Cranial Nerves: No facial asymmetry  Motor: No weakness  Coordination: Coordination normal       Gait: Gait normal    Psychiatric:         Behavior: Behavior normal          Thought Content:  Thought content normal          Judgment: Judgment normal  Momo Fong

## 2022-11-10 ENCOUNTER — TELEPHONE (OUTPATIENT)
Dept: BARIATRICS | Facility: CLINIC | Age: 47
End: 2022-11-10

## 2022-11-21 ENCOUNTER — OFFICE VISIT (OUTPATIENT)
Dept: FAMILY MEDICINE CLINIC | Facility: CLINIC | Age: 47
End: 2022-11-21

## 2022-11-21 VITALS
HEIGHT: 61 IN | OXYGEN SATURATION: 97 % | HEART RATE: 60 BPM | TEMPERATURE: 97.8 F | WEIGHT: 169 LBS | RESPIRATION RATE: 16 BRPM | BODY MASS INDEX: 31.91 KG/M2 | DIASTOLIC BLOOD PRESSURE: 70 MMHG | SYSTOLIC BLOOD PRESSURE: 110 MMHG

## 2022-11-21 DIAGNOSIS — J06.9 ACUTE URI: Primary | ICD-10-CM

## 2022-11-21 RX ORDER — AZITHROMYCIN 250 MG/1
TABLET, FILM COATED ORAL
Qty: 6 TABLET | Refills: 0 | Status: SHIPPED | OUTPATIENT
Start: 2022-11-21 | End: 2022-11-26

## 2022-11-21 RX ORDER — BENZONATATE 200 MG/1
200 CAPSULE ORAL 3 TIMES DAILY PRN
Qty: 20 CAPSULE | Refills: 0 | Status: SHIPPED | OUTPATIENT
Start: 2022-11-21

## 2022-11-21 NOTE — PATIENT INSTRUCTIONS
Azithromycin (By mouth)   Azithromycin (oz-jklv-bui-MYE-sin)  Treats infections  This medicine is a macrolide antibiotic  Brand Name(s): Zithromax, Zithromax Tri-Raymon, Zithromax Z-Raymon, Zmax   There may be other brand names for this medicine  When This Medicine Should Not Be Used: This medicine is not right for everyone  Do not use it if you had an allergic reaction to azithromycin, erythromycin, or similar medicines, or if you have a history of liver problems caused by azithromycin  How to Use This Medicine:   Capsule, Liquid, Packet, Powder, Tablet  Your doctor will tell you how much medicine to use  Do not use more than directed  Take all of the medicine in your prescription to clear up your infection, even if you feel better after the first few doses  Multiple dose (Zithromax® oral liquid or tablets): You may take this medicine with or without food  Shake the bottle well before you measure the medicine  Measure the oral liquid medicine with a marked measuring spoon, oral syringe, or medicine cup  Single dose (Zmax® extended-release oral liquid or powder):   Liquid:   Take this medicine on an empty stomach at least 1 hour before you eat, or 2 hours after you eat  Call your doctor right away if you vomit within 1 hour after you take the medicine  You must take the liquid within 12 hours after the pharmacist gives it to you  Shake the bottle well before you measure the medicine  Measure your dose with a marked measuring spoon, cup, or syringe  Powder:   Open 1 packet and pour all of the medicine into a glass with about 2 ounces (¼ cup) of water  Mix well and drink the medicine right away  Pour another 2 ounces of water into the same glass, and drink the remaining medicine  Read and follow the patient instructions that come with this medicine  Talk to your doctor or pharmacist if you have any questions  Missed dose: If you are taking multiple doses, take the dose as soon as you remember   If it is almost time for your next dose, wait until then to take a regular dose  Do not use extra medicine to make up for a missed dose  Store the medicine in a closed container at room temperature, away from heat, moisture, and direct light  Extended-release oral liquid: Do not refrigerate or freeze  Oral liquid for 1 dose only: Store at room temperature  Do not store in the refrigerator or allow the medicine to freeze  Oral liquid for multiple doses: Store at room temperature or in the refrigerator  Use it within 10 days of filling the prescription  Drugs and Foods to Avoid:   Ask your doctor or pharmacist before using any other medicine, including over-the-counter medicines, vitamins, and herbal products  Some medicines can affect how azithromycin works  Tell your doctor if you are also using any of the following:  Colchicine, cyclosporine, digoxin, nelfinavir, phenytoin  Blood thinner (including warfarin)  Ergot medicine  Medicine for a heart rhythm problem (including amiodarone, dofetilide, procainamide, quinidine, sotalol)  Zithromax® for multiple doses: Do not take an antacid that contains magnesium or aluminum at the same time you take Zithromax®  An antacid will affect how the medicine works  Antacids will not affect Zmax® for single dose  Warnings While Using This Medicine:   Tell your doctor if you are pregnant or breastfeeding, or if you have kidney disease, liver disease, heart disease, heart rhythm problems, heart failure, or myasthenia gravis  Tell your doctor if anyone in your family has heart rhythm problems    This medicine may cause the following problems:   Serious skin reactions, including Hill-Tray syndrome, acute generalized exanthematous pustulosis, toxic epidermal necrolysis, and drug reaction with eosinophilia and systemic symptoms (DRESS)  Liver problems  Infantile hypertrophic pyloric stenosis  Heart rhythm problems, including QT prolongation  Increased risk of serious heart or blood vessel problems  This medicine can cause diarrhea  Call your doctor if the diarrhea becomes severe, does not stop, or is bloody  Do not take any medicine to stop diarrhea until you have talked to your doctor  Diarrhea can occur 2 months or more after you stop taking this medicine  It may occur 2 months or more after you stop using this medicine  Call your doctor if your symptoms do not improve or if they get worse  Your doctor will do lab tests at regular visits to check on the effects of this medicine  Keep all appointments  Keep all medicine out of the reach of children  Never share your medicine with anyone  Possible Side Effects While Using This Medicine:   Call your doctor right away if you notice any of these side effects: Allergic reaction: Itching or hives, swelling in your face or hands, swelling or tingling in your mouth or throat, chest tightness, trouble breathing  Blistering, peeling, red skin rash  Dark urine, pale stools, nausea, vomiting, loss of appetite, stomach pain, yellow skin or eyes  Fainting, dizziness, lightheadedness  Fast, pounding, or uneven heartbeat, blurred vision, chest pain, trouble breathing, tiredness or weakness  Feeling irritable or vomits after feeding (in babies)  Severe diarrhea that may contain blood, stomach cramps, fever  If you notice these less serious side effects, talk with your doctor:   Mild diarrhea, nausea, vomiting, stomach pain  If you notice other side effects that you think are caused by this medicine, tell your doctor  Call your doctor for medical advice about side effects  You may report side effects to FDA at 1-479-FDA-1223    © Copyright [employer] 2022 Information is for End User's use only and may not be sold, redistributed or otherwise used for commercial purposes  The above information is an  only  It is not intended as medical advice for individual conditions or treatments   Talk to your doctor, nurse or pharmacist before following any medical regimen to see if it is safe and effective for you

## 2022-11-21 NOTE — PROGRESS NOTES
Assessment/Plan:    1  Acute URI  -     azithromycin (ZITHROMAX) 250 mg tablet; Take 500mg on day 1, 250mg on days 2-5  -     benzonatate (TESSALON) 200 MG capsule; Take 1 capsule (200 mg total) by mouth 3 (three) times a day as needed for cough          Patient Instructions: Take medication with food  It is important that you take the entire course of antibiotics prescribed  May also take a probiotic of your choice to maintain healthy GI sami  Can take some probiotic and yogurt with the medication  Supportive care discussed and advised  Advised to RTO for any worsening and no improvement  Follow up for no improvement and worsening of conditions  Patient advised and educated when to see immediate medical care  Return if symptoms worsen or fail to improve  Future Appointments   Date Time Provider Lauren Guadarrama   12/8/2022 10:00 AM Dennise Andrade MD [facility]   12/13/2022  8:15 AM DO EDGAR Valenzuela [facility]   12/21/2022 10:30 AM [facility]HAEL [facility]   12/21/2022 11:00 AM [facility] [address]           Subjective:      Patient ID: Mack Talley is a 56 y o  female  Chief Complaint   Patient presents with   • Cold Like Symptoms     Onset of sx 11/14, negative home covid test yesterday   Ata Mae MA           Vitals:  /70   Pulse 60   Temp 97 8 °F (36 6 °C)   Resp 16   Ht 5' 1 25" (1 556 m)   Wt 76 7 kg (169 lb)   SpO2 97%   BMI 31 67 kg/m²   Wt Readings from Last 3 Encounters:   11/21/22 76 7 kg (169 lb)   10/31/22 78 kg (172 lb)   06/24/22 76 2 kg (168 lb)      HPI  Patient stated that started with cough on 11/14/2022 and stated that getting worse  Denies fever, chills and sob  Did home covid-19 test which is negative and tried OTC but no relief  Stated that lump she was feeling on left side of neck is resolved          The following portions of the patient's history were reviewed and updated as appropriate: allergies, current medications, past family history, past medical history, past social history, past surgical history and problem list       Review of Systems   Constitutional: Negative for chills, diaphoresis, fatigue, fever and unexpected weight change  HENT: Positive for sinus pressure  Negative for congestion, dental problem, drooling, ear discharge, ear pain, facial swelling, hearing loss, mouth sores, nosebleeds, postnasal drip, rhinorrhea, sinus pain, sneezing, sore throat, tinnitus, trouble swallowing and voice change  Respiratory: Positive for cough  Negative for chest tightness, shortness of breath and wheezing  Cardiovascular: Negative  Gastrointestinal: Negative for abdominal pain, constipation, diarrhea, nausea and vomiting  Musculoskeletal: Negative  Skin: Negative  Neurological: Negative for dizziness, light-headedness and headaches  Objective:    Social History     Tobacco Use   Smoking Status Never   Smokeless Tobacco Never       Allergies: No Known Allergies      Current Outpatient Medications   Medication Sig Dispense Refill   • azithromycin (ZITHROMAX) 250 mg tablet Take 500mg on day 1, 250mg on days 2-5 6 tablet 0   • benzonatate (TESSALON) 200 MG capsule Take 1 capsule (200 mg total) by mouth 3 (three) times a day as needed for cough 20 capsule 0   • Calcium-Vitamin A-Vitamin D (LIQUID CALCIUM PO) Take 1,500 mg by mouth daily     • cholecalciferol (VITAMIN D3) 1,000 units tablet Take 2,000 Units by mouth daily     • escitalopram (LEXAPRO) 10 mg tablet Take 1 tablet (10 mg total) by mouth daily 90 tablet 0   • fluticasone (FLONASE) 50 mcg/act nasal spray 2 sprays into each nostril daily (Patient taking differently: 2 sprays into each nostril as needed) 16 g 3   • levonorgestrel (MIRENA) 20 MCG/24HR IUD 1 each by Intrauterine route once     • multivitamin (THERAGRAN) TABS Take 1 tablet by mouth daily       No current facility-administered medications for this visit            Physical Exam  Vitals reviewed  Constitutional:       Appearance: Normal appearance  She is well-developed  HENT:      Head: Normocephalic  Right Ear: Tympanic membrane, ear canal and external ear normal       Left Ear: Tympanic membrane, ear canal and external ear normal       Nose: Nose normal       Right Sinus: No maxillary sinus tenderness or frontal sinus tenderness  Left Sinus: No maxillary sinus tenderness or frontal sinus tenderness  Mouth/Throat:      Mouth: No oral lesions  Pharynx: No oropharyngeal exudate or posterior oropharyngeal erythema  Cardiovascular:      Rate and Rhythm: Normal rate and regular rhythm  Heart sounds: Normal heart sounds  Pulmonary:      Effort: Pulmonary effort is normal       Breath sounds: Normal breath sounds  Musculoskeletal:         General: Normal range of motion  Cervical back: Neck supple  Lymphadenopathy:      Cervical:      Right cervical: No superficial or posterior cervical adenopathy  Left cervical: No superficial or posterior cervical adenopathy  Skin:     General: Skin is warm and dry  Neurological:      Mental Status: She is alert and oriented to person, place, and time  Psychiatric:         Behavior: Behavior normal          Thought Content:  Thought content normal          Judgment: Judgment normal                      YESY Barrios

## 2022-12-03 ENCOUNTER — APPOINTMENT (OUTPATIENT)
Dept: LAB | Facility: HOSPITAL | Age: 47
End: 2022-12-03

## 2022-12-03 DIAGNOSIS — E78.2 MIXED HYPERLIPIDEMIA: ICD-10-CM

## 2022-12-03 DIAGNOSIS — K91.2 POSTSURGICAL MALABSORPTION: ICD-10-CM

## 2022-12-03 DIAGNOSIS — E66.9 OBESITY, CLASS I, BMI 30-34.9: ICD-10-CM

## 2022-12-03 LAB
25(OH)D3 SERPL-MCNC: 24.7 NG/ML (ref 30–100)
ALBUMIN SERPL BCP-MCNC: 3.7 G/DL (ref 3.5–5)
ALP SERPL-CCNC: 83 U/L (ref 46–116)
ALT SERPL W P-5'-P-CCNC: 23 U/L (ref 12–78)
ANION GAP SERPL CALCULATED.3IONS-SCNC: 4 MMOL/L (ref 4–13)
AST SERPL W P-5'-P-CCNC: 14 U/L (ref 5–45)
BASOPHILS # BLD AUTO: 0.05 THOUSANDS/ÂΜL (ref 0–0.1)
BASOPHILS NFR BLD AUTO: 0 % (ref 0–1)
BILIRUB SERPL-MCNC: 0.56 MG/DL (ref 0.2–1)
BUN SERPL-MCNC: 15 MG/DL (ref 5–25)
CALCIUM SERPL-MCNC: 9.2 MG/DL (ref 8.3–10.1)
CHLORIDE SERPL-SCNC: 111 MMOL/L (ref 96–108)
CHOLEST SERPL-MCNC: 177 MG/DL
CO2 SERPL-SCNC: 24 MMOL/L (ref 21–32)
CREAT SERPL-MCNC: 0.61 MG/DL (ref 0.6–1.3)
EOSINOPHIL # BLD AUTO: 0.13 THOUSAND/ÂΜL (ref 0–0.61)
EOSINOPHIL NFR BLD AUTO: 1 % (ref 0–6)
ERYTHROCYTE [DISTWIDTH] IN BLOOD BY AUTOMATED COUNT: 12.5 % (ref 11.6–15.1)
EST. AVERAGE GLUCOSE BLD GHB EST-MCNC: 97 MG/DL
FERRITIN SERPL-MCNC: 116 NG/ML (ref 8–388)
FOLATE SERPL-MCNC: 16.3 NG/ML (ref 3.1–17.5)
GFR SERPL CREATININE-BSD FRML MDRD: 109 ML/MIN/1.73SQ M
GLUCOSE P FAST SERPL-MCNC: 90 MG/DL (ref 65–99)
HBA1C MFR BLD: 5 %
HCT VFR BLD AUTO: 43.1 % (ref 34.8–46.1)
HDLC SERPL-MCNC: 65 MG/DL
HGB BLD-MCNC: 14.1 G/DL (ref 11.5–15.4)
IMM GRANULOCYTES # BLD AUTO: 0.06 THOUSAND/UL (ref 0–0.2)
IMM GRANULOCYTES NFR BLD AUTO: 1 % (ref 0–2)
IRON SATN MFR SERPL: 29 % (ref 15–50)
IRON SERPL-MCNC: 89 UG/DL (ref 50–170)
LDLC SERPL CALC-MCNC: 96 MG/DL (ref 0–100)
LYMPHOCYTES # BLD AUTO: 2.5 THOUSANDS/ÂΜL (ref 0.6–4.47)
LYMPHOCYTES NFR BLD AUTO: 20 % (ref 14–44)
MCH RBC QN AUTO: 31.6 PG (ref 26.8–34.3)
MCHC RBC AUTO-ENTMCNC: 32.7 G/DL (ref 31.4–37.4)
MCV RBC AUTO: 97 FL (ref 82–98)
MONOCYTES # BLD AUTO: 0.63 THOUSAND/ÂΜL (ref 0.17–1.22)
MONOCYTES NFR BLD AUTO: 5 % (ref 4–12)
NEUTROPHILS # BLD AUTO: 9.24 THOUSANDS/ÂΜL (ref 1.85–7.62)
NEUTS SEG NFR BLD AUTO: 73 % (ref 43–75)
NONHDLC SERPL-MCNC: 112 MG/DL
NRBC BLD AUTO-RTO: 0 /100 WBCS
PLATELET # BLD AUTO: 332 THOUSANDS/UL (ref 149–390)
PMV BLD AUTO: 10.9 FL (ref 8.9–12.7)
POTASSIUM SERPL-SCNC: 4 MMOL/L (ref 3.5–5.3)
PROT SERPL-MCNC: 6.6 G/DL (ref 6.4–8.4)
PTH-INTACT SERPL-MCNC: 77 PG/ML (ref 18.4–80.1)
RBC # BLD AUTO: 4.46 MILLION/UL (ref 3.81–5.12)
SODIUM SERPL-SCNC: 139 MMOL/L (ref 135–147)
TIBC SERPL-MCNC: 311 UG/DL (ref 250–450)
TRIGL SERPL-MCNC: 80 MG/DL
VIT B12 SERPL-MCNC: 651 PG/ML (ref 100–900)
WBC # BLD AUTO: 12.61 THOUSAND/UL (ref 4.31–10.16)

## 2022-12-05 ENCOUNTER — TELEPHONE (OUTPATIENT)
Dept: BARIATRICS | Facility: CLINIC | Age: 47
End: 2022-12-05

## 2022-12-05 DIAGNOSIS — K91.2 POSTSURGICAL MALABSORPTION: Primary | ICD-10-CM

## 2022-12-05 DIAGNOSIS — E55.9 VITAMIN D INSUFFICIENCY: ICD-10-CM

## 2022-12-05 RX ORDER — ERGOCALCIFEROL 1.25 MG/1
50000 CAPSULE ORAL
Qty: 16 CAPSULE | Refills: 0 | Status: SHIPPED | OUTPATIENT
Start: 2022-12-05

## 2022-12-06 LAB — VIT B1 BLD-SCNC: 214 NMOL/L (ref 66.5–200)

## 2022-12-07 LAB — VIT A SERPL-MCNC: 42.1 UG/DL (ref 20.1–62)

## 2022-12-08 ENCOUNTER — OFFICE VISIT (OUTPATIENT)
Dept: BARIATRICS | Facility: CLINIC | Age: 47
End: 2022-12-08

## 2022-12-08 VITALS
SYSTOLIC BLOOD PRESSURE: 114 MMHG | WEIGHT: 172.6 LBS | DIASTOLIC BLOOD PRESSURE: 68 MMHG | HEIGHT: 62 IN | HEART RATE: 55 BPM | BODY MASS INDEX: 31.76 KG/M2

## 2022-12-08 DIAGNOSIS — Z12.11 SCREENING FOR COLON CANCER: ICD-10-CM

## 2022-12-08 DIAGNOSIS — Z98.84 S/P BARIATRIC SURGERY: ICD-10-CM

## 2022-12-08 DIAGNOSIS — K91.2 POSTSURGICAL MALABSORPTION: ICD-10-CM

## 2022-12-08 DIAGNOSIS — Z48.815 ENCOUNTER FOR SURGICAL AFTERCARE FOLLOWING SURGERY ON THE DIGESTIVE SYSTEM: Primary | ICD-10-CM

## 2022-12-08 DIAGNOSIS — E66.9 OBESITY, CLASS I, BMI 30-34.9: ICD-10-CM

## 2022-12-08 LAB — ZINC SERPL-MCNC: 55 UG/DL (ref 44–115)

## 2022-12-08 NOTE — PROGRESS NOTES
Progress Note - Bariatric Surgery   Amirah Wright 56 y o  female MRN: [id number]  Unit/Bed#:  Encounter: [id number]    Assessment/Plan:  47/F presents for annual visit s/p LRYGB; %EWL 65%    Mindful eating, stress reduction, sleep hygiene   Exercise  MVI/minerals  RD/LCSW f/u 6 months   Obtain metabolic panel and RTO in 1 year       Subjective/Objective     Subjective: She is doing well  She has increased energy and decreased pain  She was able to walk over 40 miles in [facility] with no issues at all! Denies GERD/dysphagia  Adequate protein/H20  Is taking MVI/minerals as instructed  +Exercise (walking)  Review of systems: Negative except as noted above    Objective:     Blood pressure 114/68, pulse 55, height 5' 1 7" (1 567 m), weight 78 3 kg (172 lb 9 6 oz)  ,Body mass index is 31 88 kg/m²  Invasive Devices     None                 Physical Exam:    No distress   EOMI   CN II-XII grossly intact  Neck ROM wnl   RRR  Breathing non labored   Abd flat, ND  Skin warm/dry  Msk ROM wnl   Thought content/behavior/mood wnl               Lab, Imaging and other studies:I have personally reviewed pertinent lab results

## 2022-12-13 ENCOUNTER — OFFICE VISIT (OUTPATIENT)
Dept: FAMILY MEDICINE CLINIC | Facility: CLINIC | Age: 47
End: 2022-12-13

## 2022-12-13 VITALS
HEART RATE: 73 BPM | WEIGHT: 175 LBS | HEIGHT: 61 IN | BODY MASS INDEX: 33.04 KG/M2 | OXYGEN SATURATION: 98 % | DIASTOLIC BLOOD PRESSURE: 60 MMHG | RESPIRATION RATE: 16 BRPM | SYSTOLIC BLOOD PRESSURE: 110 MMHG | TEMPERATURE: 97.5 F

## 2022-12-13 DIAGNOSIS — F41.9 ANXIETY: ICD-10-CM

## 2022-12-13 DIAGNOSIS — F32.1 MODERATE MAJOR DEPRESSION (HCC): Primary | ICD-10-CM

## 2022-12-13 RX ORDER — ESCITALOPRAM OXALATE 20 MG/1
20 TABLET ORAL DAILY
Qty: 90 TABLET | Refills: 1 | Status: SHIPPED | OUTPATIENT
Start: 2022-12-13

## 2022-12-13 NOTE — PROGRESS NOTES
Name: Bull Russell      : 1975      MRN: [id number]  Encounter Provider: Maria Luz Walter DO  Encounter Date: 2022   Encounter department: [address] [place] [address]     1  Moderate major depression ([facility] [address] )  Assessment & Plan:  Not controlled   Dose of lexapro increased from 10 to 20 mg a day      2  Anxiety  Assessment & Plan:  Stable, but depression is acting up    Orders:  -     escitalopram (LEXAPRO) 20 mg tablet; Take 1 tablet (20 mg total) by mouth daily    Return in about 6 months (around 2023) for Annual physical   Reminded to schedule her colonoscopy  Encouraged her to start exercising and to consider meditation        Subjective      She has been beating herself up about things  PHQ-2/9 Depression Screening    Little interest or pleasure in doing things: 2 - more than half the days  Feeling down, depressed, or hopeless: 2 - more than half the days  Trouble falling or staying asleep, or sleeping too much: 1 - several days  Feeling tired or having little energy: 3 - nearly every day  Poor appetite or overeatin - not at all  Feeling bad about yourself - or that you are a failure or have let   yourself or your family down: 2 - more than half the days  Trouble concentrating on things, such as reading the newspaper or watching   television: 1 - several days  Moving or speaking so slowly that other people could have noticed   Or the   opposite - being so fidgety or restless that you have been moving around a   lot more than usual: 0 - not at all  Thoughts that you would be better off dead, or of hurting yourself in some   way: 0 - not at all  PHQ-2 Score: 4  PHQ-2 Interpretation: POSITIVE depression screen  PHQ-9 Score: 11   PHQ-9 Interpretation: Moderate depression          Review of Systems    Current Outpatient Medications on File Prior to Visit   Medication Sig   • Calcium-Vitamin A-Vitamin D (LIQUID CALCIUM PO) Take 1,500 mg by mouth daily   • ergocalciferol (VITAMIN D2) 50,000 units Take 1 capsule (50,000 Units total) by mouth 2 (two) times a week with meals   • fluticasone (FLONASE) 50 mcg/act nasal spray 2 sprays into each nostril daily (Patient taking differently: 2 sprays into each nostril as needed)   • levonorgestrel (MIRENA) 20 MCG/24HR IUD 1 each by Intrauterine route once   • multivitamin (THERAGRAN) TABS Take 1 tablet by mouth daily   • [DISCONTINUED] escitalopram (LEXAPRO) 10 mg tablet Take 1 tablet (10 mg total) by mouth daily       Objective     /60 (BP Location: Left arm, Patient Position: Sitting, Cuff Size: Standard)   Pulse 73   Temp 97 5 °F (36 4 °C) (Temporal)   Resp 16   Ht 5' 0 75" (1 543 m)   Wt 79 4 kg (175 lb)   SpO2 98%   BMI 33 34 kg/m²     Physical Exam  Vitals and nursing note reviewed  Constitutional:       Appearance: She is well-developed  HENT:      Head: Normocephalic and atraumatic  Right Ear: Tympanic membrane and external ear normal       Left Ear: Tympanic membrane and external ear normal    Cardiovascular:      Rate and Rhythm: Normal rate and regular rhythm  Heart sounds: Normal heart sounds  No murmur heard  No friction rub  Pulmonary:      Effort: Pulmonary effort is normal  No respiratory distress  Breath sounds: Normal breath sounds  No wheezing or rales  Musculoskeletal:      Right lower leg: No edema  Left lower leg: No edema         Alexander Sportsman, DO

## 2022-12-21 ENCOUNTER — HOSPITAL ENCOUNTER (OUTPATIENT)
Dept: RADIOLOGY | Facility: HOSPITAL | Age: 47
Discharge: HOME | End: 2022-12-21

## 2022-12-21 VITALS — BODY MASS INDEX: 32.66 KG/M2 | WEIGHT: 173 LBS | HEIGHT: 61 IN

## 2022-12-21 DIAGNOSIS — R92.8 ABNORMAL MAMMOGRAM: ICD-10-CM

## 2023-06-01 ENCOUNTER — OFFICE VISIT (OUTPATIENT)
Dept: GASTROENTEROLOGY | Facility: CLINIC | Age: 48
End: 2023-06-01

## 2023-06-01 VITALS
WEIGHT: 183 LBS | DIASTOLIC BLOOD PRESSURE: 80 MMHG | HEIGHT: 61 IN | BODY MASS INDEX: 34.55 KG/M2 | HEART RATE: 57 BPM | SYSTOLIC BLOOD PRESSURE: 131 MMHG

## 2023-06-01 DIAGNOSIS — Z12.11 SCREENING FOR COLON CANCER: Primary | ICD-10-CM

## 2023-06-01 DIAGNOSIS — Z98.84 S/P BARIATRIC SURGERY: ICD-10-CM

## 2023-06-01 RX ORDER — POLYETHYLENE GLYCOL 3350, SODIUM CHLORIDE, SODIUM BICARBONATE, POTASSIUM CHLORIDE 420; 11.2; 5.72; 1.48 G/4L; G/4L; G/4L; G/4L
4000 POWDER, FOR SOLUTION ORAL ONCE
Qty: 4000 ML | Refills: 0 | Status: SHIPPED | OUTPATIENT
Start: 2023-06-01 | End: 2023-06-01

## 2023-06-01 NOTE — H&P (VIEW-ONLY)
"Chief Complaint   Patient presents with   • Hypertension       Subjective   Patient returns the office today to recheck his arterial hypertension.  No medication side effects with new olmesartan noted.  Blood pressure is much improved.  Lipids unchanged  I have reviewed and updated his medications, medical history and problem list during today's office visit.     Patient Care Team:  Mj Cha MD as PCP - General (Family Medicine)    Social History     Tobacco Use   • Smoking status: Never Smoker   • Smokeless tobacco: Never Used   Substance Use Topics   • Alcohol use: Yes     Comment: Rare \"a beer every 2 years\"       Review of Systems   Constitutional: Negative for fatigue.   Cardiovascular: Negative for chest pain.   Neurological: Negative for headache.       Objective     /79   Pulse 56   Temp 97.1 °F (36.2 °C) (Oral)   Resp 16   Ht 170.2 cm (67\")   Wt 77.1 kg (170 lb)   BMI 26.63 kg/m²     Body mass index is 26.63 kg/m².    Physical Exam   Constitutional: He is oriented to person, place, and time. No distress.   Cardiovascular: Normal rate and normal heart sounds.   Pulmonary/Chest: Effort normal and breath sounds normal.   Neurological: He is alert and oriented to person, place, and time.   Skin: He is not diaphoretic.   Psychiatric: He has a normal mood and affect. His speech is normal. He is attentive.   Vitals reviewed.       Data Reviewed:                    Assessment/Plan     Diagnoses and all orders for this visit:    1. Essential hypertension (Primary)  Assessment & Plan:  Hypertension is improving with treatment.  Continue current treatment regimen.  Blood pressure will be reassessed at the next regular appointment.    Orders:  -     Comprehensive Metabolic Panel; Future  -     CBC & Differential; Future  -     TSH; Future    2. Other hyperlipidemia  Assessment & Plan:  Lipid abnormalities are unchanged.  Pharmacotherapy as ordered.  Lipids will be reassessed At our next " [facility] - Outpatient Consultation  Danish Friedman 56 y o  female MRN: [id number]  Encounter: [id number]          ASSESSMENT AND PLAN:      1  Screening for colon cancer  -     Ambulatory referral to Gastroenterology  -     Colonoscopy; Future; Expected date: 06/01/2023  -     polyethylene glycol-electrolytes (NULYTELY) 4000 mL solution; Take 4,000 mL by mouth once for 1 dose    2  S/P bariatric surgery    3  BMI 34 0-34 9,adult      Patient evaluate for screening for colorectal cancer, colonoscopy procedure options and prep discussed at length  She had bariatric surgery done, lost 50 pounds  Schedule in next few weeks at [facility]  ______________________________________________________________________    HPI:      Patient came in for evaluation and discussion of colon cancer screening, never had a colonoscopy done  Denies any blood in stools melena hematochezia abdominal pain change in bowel habits constipation diarrhea weight loss appetite is fair reasonably active, denies chest pain shortness of breath dysphagia chest pain shortness of breath, no history of CVA CAD CAD stents pacemakers  Reasonably active  Works in the [employer]  Diet medications more than 10 systems reviewed  REVIEW OF SYSTEMS:    CONSTITUTIONAL: Denies any fever, chills, rigors, and weight loss  HEENT: No earache or tinnitus  CARDIOVASCULAR: No chest pain or palpitations  RESPIRATORY: Denies any cough, hemoptysis, shortness of breath or dyspnea on exertion  GASTROINTESTINAL: As noted in the History of Present Illness  GENITOURINARY: Denies any hematuria or dysuria  NEUROLOGIC: No dizziness or vertigo  MUSCULOSKELETAL: Denies any joint swellings  SKIN: Denies skin rashes or itching  ENDOCRINE: Denies excessive thirst  Denies intolerance to heat or cold  PSYCHOSOCIAL: Denies depression or anxiety  Denies any recent memory loss         Historical Information   Past Medical History: Diagnosis Date   • COVID-19 vaccine series completed    • Depression    • Fractures 10/1993    5th metatarsal rt   • Hyperlipidemia     recent dx    • Morbid obesity with BMI of 40 0-44 9, adult (HCC)     EGD in preparation for gastric bypass surgery   • Wears glasses      Past Surgical History:   Procedure Laterality Date   • BREAST BIOPSY Right     benign- in her 29's   • CHOLECYSTECTOMY  2015    [employer]   • FERTILITY SURGERY     • LASER ABLATION  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   • ID LAPS GSTR RSTCV PX W/BYP MIGUEL-EN-Y LIMB <150 CM N/A 2021    Procedure: LAPAROSCOPIC MIGUEL-EN-Y GASTRIC BYPASS AND INTRAOPERATIVE EGD;  Surgeon: Dax Espino MD;  Location: [facility];  Service: Bariatrics   • WISDOM TOOTH EXTRACTION       Social History   Social History     Substance and Sexual Activity   Alcohol Use Not Currently   • Alcohol/week: 0 0 standard drinks of alcohol    Comment: Very rare, on special occasions     Social History     Substance and Sexual Activity   Drug Use No     Social History     Tobacco Use   Smoking Status Never   Smokeless Tobacco Never     Family History   Problem Relation Age of Onset   • Vision loss Mother    • Breast cancer Mother 58   • Cancer Mother         Breast, very early stage, tumor removed   • Osteoporosis Mother         On Prolia injections   • Stroke Father    • Diabetes Father         type 2   • Asthma Sister    • Scoliosis Sister         Spondyloliyhesis   • ADD / ADHD Daughter    • Melanoma Maternal Grandmother    • Cancer Maternal Grandmother         Metastic Skin Cancer     • Learning disabilities Brother         Passed in    • Learning disabilities Brother         [de-identified] brother lives in [place]   • Hepatitis Maternal Aunt        Meds/Allergies       Current Outpatient Medications:   •  Calcium-Vitamin A-Vitamin D (LIQUID CALCIUM PO)  •  escitalopram (LEXAPRO) 20 mg tablet  •  fluticasone (FLONASE) 50 mcg/act nasal visit.    Orders:  -     Lipid Panel With LDL / HDL Ratio; Future  -     CK; Future    3. Weak urine stream  -     PSA DIAGNOSTIC; Future      Orders Placed This Encounter   Procedures   • Comprehensive Metabolic Panel     Standing Status:   Future     Standing Expiration Date:   4/1/2020   • Lipid Panel With LDL / HDL Ratio     Standing Status:   Future     Standing Expiration Date:   4/1/2020   • CK     Standing Status:   Future     Standing Expiration Date:   4/1/2020   • TSH     Standing Status:   Future     Standing Expiration Date:   4/1/2020   • PSA DIAGNOSTIC     Standing Status:   Future     Standing Expiration Date:   4/1/2020   • CBC & Differential     Standing Status:   Future     Order Specific Question:   Manual Differential     Answer:   No         Current Outpatient Medications:   •  aspirin 81 MG tablet, Take 81 mg by mouth., Disp: , Rfl:   •  atorvastatin (LIPITOR) 20 MG tablet, Take 1 tablet by mouth Every Night., Disp: 90 tablet, Rfl: 3  •  olmesartan (BENICAR) 40 MG tablet, Take 1 tablet by mouth Daily., Disp: 90 tablet, Rfl: 3    Return in about 9 months (around 1/8/2020) for Recheck, Adult Wellness Visit, 30 minute visit.                  "spray  •  levonorgestrel (MIRENA) 20 MCG/24HR IUD  •  multivitamin (THERAGRAN) TABS  •  polyethylene glycol-electrolytes (NULYTELY) 4000 mL solution  •  ergocalciferol (VITAMIN D2) 50,000 units    No Known Allergies        Objective     Blood pressure 131/80, pulse 57, height 5' 1\" (1 549 m), weight 83 kg (183 lb)  Body mass index is 34 58 kg/m²  PHYSICAL EXAM:      General Appearance:   Alert, cooperative, no distress   HEENT:   Normocephalic, atraumatic, anicteric  Neck:  Supple, symmetrical, trachea midline   Lungs:   Clear to auscultation bilaterally; no rales, rhonchi or wheezing; respirations unlabored    Heart[de-identified]   Regular rate and rhythm; no murmur  Abdomen:   Soft, non-tender, non-distended; normal bowel sounds; no masses, no organomegaly    Genitalia:   Deferred    Rectal:   Deferred    Extremities:  No cyanosis, clubbing or edema    Skin:  No jaundice, rashes, or lesions    Lymph nodes:  No palpable cervical lymphadenopathy        Lab Results:   No visits with results within 1 Day(s) from this visit     Latest known visit with results is:   Appointment on 12/03/2022   Component Date Value   • WBC 12/03/2022 12 61 (H)    • RBC 12/03/2022 4 46    • Hemoglobin 12/03/2022 14 1    • Hematocrit 12/03/2022 43 1    • MCV 12/03/2022 97    • MCH 12/03/2022 31 6    • MCHC 12/03/2022 32 7    • RDW 12/03/2022 12 5    • MPV 12/03/2022 10 9    • Platelets 08/14/1222 332    • nRBC 12/03/2022 0    • Neutrophils Relative 12/03/2022 73    • Immat GRANS % 12/03/2022 1    • Lymphocytes Relative 12/03/2022 20    • Monocytes Relative 12/03/2022 5    • Eosinophils Relative 12/03/2022 1    • Basophils Relative 12/03/2022 0    • Neutrophils Absolute 12/03/2022 9 24 (H)    • Immature Grans Absolute 12/03/2022 0 06    • Lymphocytes Absolute 12/03/2022 2 50    • Monocytes Absolute 12/03/2022 0 63    • Eosinophils Absolute 12/03/2022 0 13    • Basophils Absolute 12/03/2022 0 05    • Sodium 12/03/2022 139    • Potassium " 12/03/2022 4 0    • Chloride 12/03/2022 111 (H)    • CO2 12/03/2022 24    • ANION GAP 12/03/2022 4    • BUN 12/03/2022 15    • Creatinine 12/03/2022 0 61    • Glucose, Fasting 12/03/2022 90    • Calcium 12/03/2022 9 2    • AST 12/03/2022 14    • ALT 12/03/2022 23    • Alkaline Phosphatase 12/03/2022 83    • Total Protein 12/03/2022 6 6    • Albumin 12/03/2022 3 7    • Total Bilirubin 12/03/2022 0 56    • eGFR 12/03/2022 109    • Folate 12/03/2022 16 3    • Hemoglobin A1C 12/03/2022 5 0    • EAG 12/03/2022 97    • Zinc 12/03/2022 55    • Vit D, 25-Hydroxy 12/03/2022 24 7 (L)    • Vitamin B-12 12/03/2022 651    • Vitamin B1, Whole Blood 12/03/2022 214 0 (H)    • Vitamin A 12/03/2022 42 1    • PTH 12/03/2022 77 0    • Cholesterol 12/03/2022 177    • Triglycerides 12/03/2022 80    • HDL, Direct 12/03/2022 65    • LDL Calculated 12/03/2022 96    • Non-HDL-Chol (CHOL-HDL) 12/03/2022 112    • Iron Saturation 12/03/2022 29    • TIBC 12/03/2022 311    • Iron 12/03/2022 89    • Ferritin 12/03/2022 116          Radiology Results:   No results found

## 2023-06-01 NOTE — PATIENT INSTRUCTIONS
Scheduled date of colonoscopy (as of today):06/26/23  Physician performing colonoscopy:Rayne  Location of colonoscopy:[facility]  Bowel prep reviewed with patient:Tuan  Instructions reviewed with patient by:Zakia JULES  Clearances: None

## 2023-06-01 NOTE — PROGRESS NOTES
[facility] - Outpatient Consultation  Sharonda Roberts 56 y o  female MRN: [id number]  Encounter: [id number]          ASSESSMENT AND PLAN:      1  Screening for colon cancer  -     Ambulatory referral to Gastroenterology  -     Colonoscopy; Future; Expected date: 06/01/2023  -     polyethylene glycol-electrolytes (NULYTELY) 4000 mL solution; Take 4,000 mL by mouth once for 1 dose    2  S/P bariatric surgery    3  BMI 34 0-34 9,adult      Patient evaluate for screening for colorectal cancer, colonoscopy procedure options and prep discussed at length  She had bariatric surgery done, lost 50 pounds  Schedule in next few weeks at [facility]  ______________________________________________________________________    HPI:      Patient came in for evaluation and discussion of colon cancer screening, never had a colonoscopy done  Denies any blood in stools melena hematochezia abdominal pain change in bowel habits constipation diarrhea weight loss appetite is fair reasonably active, denies chest pain shortness of breath dysphagia chest pain shortness of breath, no history of CVA CAD CAD stents pacemakers  Reasonably active  Works in the [employer]  Diet medications more than 10 systems reviewed  REVIEW OF SYSTEMS:    CONSTITUTIONAL: Denies any fever, chills, rigors, and weight loss  HEENT: No earache or tinnitus  CARDIOVASCULAR: No chest pain or palpitations  RESPIRATORY: Denies any cough, hemoptysis, shortness of breath or dyspnea on exertion  GASTROINTESTINAL: As noted in the History of Present Illness  GENITOURINARY: Denies any hematuria or dysuria  NEUROLOGIC: No dizziness or vertigo  MUSCULOSKELETAL: Denies any joint swellings  SKIN: Denies skin rashes or itching  ENDOCRINE: Denies excessive thirst  Denies intolerance to heat or cold  PSYCHOSOCIAL: Denies depression or anxiety  Denies any recent memory loss         Historical Information   Past Medical History: Diagnosis Date   • COVID-19 vaccine series completed    • Depression    • Fractures 10/1993    5th metatarsal rt   • Hyperlipidemia     recent dx    • Morbid obesity with BMI of 40 0-44 9, adult (HCC)     EGD in preparation for gastric bypass surgery   • Wears glasses      Past Surgical History:   Procedure Laterality Date   • BREAST BIOPSY Right     benign- in her 29's   • CHOLECYSTECTOMY  2015    [employer]   • FERTILITY SURGERY     • LASER ABLATION  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   • PA LAPS GSTR RSTCV PX W/BYP MIGUEL-EN-Y LIMB <150 CM N/A 2021    Procedure: LAPAROSCOPIC MIGUEL-EN-Y GASTRIC BYPASS AND INTRAOPERATIVE EGD;  Surgeon: Alisha Barros MD;  Location: [facility];  Service: Bariatrics   • WISDOM TOOTH EXTRACTION       Social History   Social History     Substance and Sexual Activity   Alcohol Use Not Currently   • Alcohol/week: 0 0 standard drinks of alcohol    Comment: Very rare, on special occasions     Social History     Substance and Sexual Activity   Drug Use No     Social History     Tobacco Use   Smoking Status Never   Smokeless Tobacco Never     Family History   Problem Relation Age of Onset   • Vision loss Mother    • Breast cancer Mother 58   • Cancer Mother         Breast, very early stage, tumor removed   • Osteoporosis Mother         On Prolia injections   • Stroke Father    • Diabetes Father         type 2   • Asthma Sister    • Scoliosis Sister         Spondyloliyhesis   • ADD / ADHD Daughter    • Melanoma Maternal Grandmother    • Cancer Maternal Grandmother         Metastic Skin Cancer     • Learning disabilities Brother         Passed in    • Learning disabilities Brother         [de-identified] brother lives in [facility]   • Hepatitis Maternal Aunt        Meds/Allergies       Current Outpatient Medications:   •  Calcium-Vitamin A-Vitamin D (LIQUID CALCIUM PO)  •  escitalopram (LEXAPRO) 20 mg tablet  •  fluticasone (FLONASE) 50 mcg/act nasal "spray  •  levonorgestrel (MIRENA) 20 MCG/24HR IUD  •  multivitamin (THERAGRAN) TABS  •  polyethylene glycol-electrolytes (NULYTELY) 4000 mL solution  •  ergocalciferol (VITAMIN D2) 50,000 units    No Known Allergies        Objective     Blood pressure 131/80, pulse 57, height 5' 1\" (1 549 m), weight 83 kg (183 lb)  Body mass index is 34 58 kg/m²  PHYSICAL EXAM:      General Appearance:   Alert, cooperative, no distress   HEENT:   Normocephalic, atraumatic, anicteric  Neck:  Supple, symmetrical, trachea midline   Lungs:   Clear to auscultation bilaterally; no rales, rhonchi or wheezing; respirations unlabored    Heart[de-identified]   Regular rate and rhythm; no murmur  Abdomen:   Soft, non-tender, non-distended; normal bowel sounds; no masses, no organomegaly    Genitalia:   Deferred    Rectal:   Deferred    Extremities:  No cyanosis, clubbing or edema    Skin:  No jaundice, rashes, or lesions    Lymph nodes:  No palpable cervical lymphadenopathy        Lab Results:   No visits with results within 1 Day(s) from this visit     Latest known visit with results is:   Appointment on 12/03/2022   Component Date Value   • WBC 12/03/2022 12 61 (H)    • RBC 12/03/2022 4 46    • Hemoglobin 12/03/2022 14 1    • Hematocrit 12/03/2022 43 1    • MCV 12/03/2022 97    • MCH 12/03/2022 31 6    • MCHC 12/03/2022 32 7    • RDW 12/03/2022 12 5    • MPV 12/03/2022 10 9    • Platelets 65/83/4325 332    • nRBC 12/03/2022 0    • Neutrophils Relative 12/03/2022 73    • Immat GRANS % 12/03/2022 1    • Lymphocytes Relative 12/03/2022 20    • Monocytes Relative 12/03/2022 5    • Eosinophils Relative 12/03/2022 1    • Basophils Relative 12/03/2022 0    • Neutrophils Absolute 12/03/2022 9 24 (H)    • Immature Grans Absolute 12/03/2022 0 06    • Lymphocytes Absolute 12/03/2022 2 50    • Monocytes Absolute 12/03/2022 0 63    • Eosinophils Absolute 12/03/2022 0 13    • Basophils Absolute 12/03/2022 0 05    • Sodium 12/03/2022 139    • Potassium " 12/03/2022 4 0    • Chloride 12/03/2022 111 (H)    • CO2 12/03/2022 24    • ANION GAP 12/03/2022 4    • BUN 12/03/2022 15    • Creatinine 12/03/2022 0 61    • Glucose, Fasting 12/03/2022 90    • Calcium 12/03/2022 9 2    • AST 12/03/2022 14    • ALT 12/03/2022 23    • Alkaline Phosphatase 12/03/2022 83    • Total Protein 12/03/2022 6 6    • Albumin 12/03/2022 3 7    • Total Bilirubin 12/03/2022 0 56    • eGFR 12/03/2022 109    • Folate 12/03/2022 16 3    • Hemoglobin A1C 12/03/2022 5 0    • EAG 12/03/2022 97    • Zinc 12/03/2022 55    • Vit D, 25-Hydroxy 12/03/2022 24 7 (L)    • Vitamin B-12 12/03/2022 651    • Vitamin B1, Whole Blood 12/03/2022 214 0 (H)    • Vitamin A 12/03/2022 42 1    • PTH 12/03/2022 77 0    • Cholesterol 12/03/2022 177    • Triglycerides 12/03/2022 80    • HDL, Direct 12/03/2022 65    • LDL Calculated 12/03/2022 96    • Non-HDL-Chol (CHOL-HDL) 12/03/2022 112    • Iron Saturation 12/03/2022 29    • TIBC 12/03/2022 311    • Iron 12/03/2022 89    • Ferritin 12/03/2022 116          Radiology Results:   No results found

## 2023-06-12 ENCOUNTER — OFFICE VISIT (OUTPATIENT)
Dept: BARIATRICS | Facility: CLINIC | Age: 48
End: 2023-06-12

## 2023-06-12 ENCOUNTER — TELEPHONE (OUTPATIENT)
Dept: BARIATRICS | Facility: CLINIC | Age: 48
End: 2023-06-12

## 2023-06-12 VITALS — HEIGHT: 62 IN | BODY MASS INDEX: 33.42 KG/M2 | WEIGHT: 181.6 LBS

## 2023-06-12 DIAGNOSIS — K91.2 POSTSURGICAL MALABSORPTION: Primary | ICD-10-CM

## 2023-06-12 PROCEDURE — RECHECK

## 2023-06-12 NOTE — PROGRESS NOTES
"Bariatric Follow Up Nutrition Note    Type of surgery  Gastric bypass: laparoscopic  Surgery Date: 11/29/21  18 months  post-op  Surgeon: Dr Henrry oRmeo  56 y o   female    Ht:  61 7\"  Weight (last 2 days)     Date/Time Weight    06/12/23 0837 82 4 (181 6)        Body mass index is 33 54 kg/m²      Initial:  241 2#  Weight on Day of Weight Loss Surgery: 219#  Weight in (lb) to have BMI = 25: 136#  Pre-Op Excess Wt: 105 2#  Gama:  168# at 6 months post-op--11lb regain in the past 6 months  Post-Op Wt Loss: 60#/ 57% EBWL in 18 month(s)    Review of History and Medications   Past Medical History:   Diagnosis Date   • COVID-19 vaccine series completed    • Depression    • Fractures 10/1993    5th metatarsal rt   • Hyperlipidemia     recent dx 8/21   • Morbid obesity with BMI of 40 0-44 9, adult ([facility] [address] )     EGD in preparation for gastric bypass surgery   • Wears glasses      Past Surgical History:   Procedure Laterality Date   • BREAST BIOPSY Right     benign- in her 29's   • CHOLECYSTECTOMY  02/19/2015    [employer]scripts   • FERTILITY SURGERY     • LASER ABLATION  09/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   • AK LAPS GSTR RSTCV PX W/BYP MIGUEL-EN-Y LIMB <150 CM N/A 11/29/2021    Procedure: LAPAROSCOPIC MIGUEL-EN-Y GASTRIC BYPASS AND INTRAOPERATIVE EGD;  Surgeon: Aysha Wills MD;  Location: [facility];  Service: Bariatrics   • WISDOM TOOTH EXTRACTION       Social History     Socioeconomic History   • Marital status: /Civil Union     Spouse name: Not on file   • Number of children: Not on file   • Years of education: Not on file   • Highest education level: Not on file   Occupational History   • Not on file   Tobacco Use   • Smoking status: Never   • Smokeless tobacco: Never   Vaping Use   • Vaping Use: Never used   Substance and Sexual Activity   • Alcohol use: Not Currently     Alcohol/week: 0 0 standard drinks of alcohol     Comment: Very rare, on special " occasions   • Drug use: No   • Sexual activity: Yes     Partners: Male     Birth control/protection: I U D  Comment: Mirena since 2009   Other Topics Concern   • Not on file   Social History Narrative   • Not on file     Social Determinants of Health     Financial Resource Strain: Not on file   Food Insecurity: Not on file   Transportation Needs: Not on file   Physical Activity: Not on file   Stress: Not on file   Social Connections: Not on file   Intimate Partner Violence: Not on file   Housing Stability: Not on file       Current Outpatient Medications:   •  Calcium-Vitamin A-Vitamin D (LIQUID CALCIUM PO), Take 1,500 mg by mouth daily, Disp: , Rfl:   •  ergocalciferol (VITAMIN D2) 50,000 units, Take 1 capsule (50,000 Units total) by mouth 2 (two) times a week with meals (Patient not taking: Reported on 6/1/2023), Disp: 16 capsule, Rfl: 0  •  escitalopram (LEXAPRO) 20 mg tablet, Take 1 tablet (20 mg total) by mouth daily, Disp: 90 tablet, Rfl: 1  •  fluticasone (FLONASE) 50 mcg/act nasal spray, 2 sprays into each nostril daily (Patient taking differently: 2 sprays into each nostril as needed), Disp: 16 g, Rfl: 3  •  levonorgestrel (MIRENA) 20 MCG/24HR IUD, 1 each by Intrauterine route once, Disp: , Rfl:   •  multivitamin (THERAGRAN) TABS, Take 1 tablet by mouth daily, Disp: , Rfl:   •  polyethylene glycol-electrolytes (NULYTELY) 4000 mL solution, Take 4,000 mL by mouth once for 1 dose, Disp: 4000 mL, Rfl: 0    Food Intake and Lifestyle Assessment   Food Intake Assessment completed via usual diet recall  Breakfast: usually protein shake (today made with 3 scoops of pro powder--advised to decrease to 1 scoop + the other Purium powder (lasts all morning)    10oz Coffee 3 splenda + SF creamer (2tbsp)  Snack:  --  Lunch: 12pm-cheese quesadilla (2% cheese) on small spinach wrap or greek yogurt or if at office will get chicken and veggies off the salad bar or buffalo chicken flat bread  Snack: Granola 1/3 cup= 160cals, 25gm carbs (will graze on through day) OR reduced fat wheat thins (tries to stick to the serving size)  Dinner: 5-6pm-chicken/shrimp/salmon/turkey burger/beef burger with veggies most of the time  The family does like pasta so will often try a bite, but then have a protein shake  Snack: -    Beverage intake: water  Diet texture/stage: regular  Protein supplement: nutracost protein powder (1 scoop=130cals, 30gm protein, 1gm carb) + Purium powder shake (2 scoops apple berry= 140cals, 7gm protein, 15gm carbs, 5gm fiber)  Estimated protein intake per day: 60-90gm  Estimated fluid intake per day: 64oz water from circul bottle  Meals eaten away from home: varies  Typical meal pattern: 3 meals per day and grazes at times on snacks  Eating Behaviors: Appropriate diet advancement, Frequent snacking/ grazing, Mindless eating, Emotional eating, Craves sweet foods and Craves salty foods    Food allergies or intolerances: ice cream  Cultural or Advent considerations: -    Vitamins:  Bariatric advantage EA 2 per day  BA chewy calcium 1 TID    Physical Assessment  Nutrition Related Findings  Dumping with ice cream    Physical Activity  Types of exercise: Walking on the treadmill--30 mins 2 miles 2-3x per week  Current physical limitations: -    Psychosocial Assessment   Support systems: spouse and children friend(s) relative(s)  Socioeconomic factors: -    Nutrition Diagnosis  Diagnosis: Inappropriate intake of carbohydrates (NI-5 8 3) and Inadequate protein intake (NI-5 7  3)  Related to: Excessive energy intake  As Evidenced by: Unintentional weight gain     Interventions and Teaching   Patient educated on post-op nutrition guidelines  Patient educated and handouts provided    Adequate hydration  Expected weight loss  Weight loss plateaus/ possibility of weight regain  Exercise  Nutrition considerations after surgery  Protein supplements  Meal planning and preparation  Appropriate carbohydrate, protein, and fat intake, and food/fluid choices to maximize safe weight loss, nutrient intake, and tolerance   Dietary and lifestyle changes  Possible problems with poor eating habits  Intuitive eating  Techniques for self monitoring and keeping daily food journal  Vitamin / Mineral supplementation of Multivitamin with minerals and Calcium    Education provided to: patient    Barriers to learning: No barriers identified    Readiness to change: action and relapsing    Comprehension: verbalizes understanding     Expected Compliance: good    Pt presents today at 18 months s/p RYGB with about 10lb weight gain in the past 6 months  She admits to not getting adequate protein until recently again and snacking/grazing  Reviewed diet recall and pt reports that her stomach was bothering her from the protein shake, but she reported today she put in more protein powder (3 scoops)  Advised didn't need that much protein as that is providing 90gm protein and 390 calories  Recommended to decrease to one scoop + the fruit/veggie powder she adds  Recommended to spread rest of protein out through the day and portion control her snacks to avoid grazing  Suggested to keep food log via [employer] to help assess intake  Pt will f/u in one month      Goals  Food journal via [address]--1200 cals, 75gm protein  Exercise 30 minutes 5 times per week--increase walking as tolerated  Eat 3 meals per day with protein at each meal  Decrease scoops of protein powder in shake to one scoop  Eliminate mindless snacking--portion control snacks  F/U in one month     Time Spent:   30 Minutes

## 2023-06-12 NOTE — TELEPHONE ENCOUNTER
0700: Called patient is inform office will r/s 0800 MSW appt  Due to MSW Sary S  Being out 6/12/2023, informed patient that RD visit can still move forward but that MSW appt  Will be r/s at her AM appt  Provided patient with [facility] office number [phone]

## 2023-06-13 ENCOUNTER — OFFICE VISIT (OUTPATIENT)
Dept: FAMILY MEDICINE CLINIC | Facility: CLINIC | Age: 48
End: 2023-06-13
Payer: COMMERCIAL

## 2023-06-13 VITALS
HEART RATE: 68 BPM | DIASTOLIC BLOOD PRESSURE: 72 MMHG | TEMPERATURE: 99.8 F | BODY MASS INDEX: 33.7 KG/M2 | RESPIRATION RATE: 18 BRPM | HEIGHT: 62 IN | SYSTOLIC BLOOD PRESSURE: 130 MMHG | WEIGHT: 183.13 LBS

## 2023-06-13 DIAGNOSIS — Z00.00 ANNUAL PHYSICAL EXAM: Primary | ICD-10-CM

## 2023-06-13 DIAGNOSIS — F45.22 BODY DYSMORPHIC DISORDER: ICD-10-CM

## 2023-06-13 DIAGNOSIS — F33.9 RECURRENT DEPRESSION (HCC): ICD-10-CM

## 2023-06-13 DIAGNOSIS — Z12.31 ENCOUNTER FOR SCREENING MAMMOGRAM FOR BREAST CANCER: ICD-10-CM

## 2023-06-13 PROCEDURE — 99396 PREV VISIT EST AGE 40-64: CPT | Performed by: FAMILY MEDICINE

## 2023-06-13 NOTE — PATIENT INSTRUCTIONS
Read about Cognitive behavioral therapy      Wellness Visit for Adults   AMBULATORY CARE:   A wellness visit  is when you see your healthcare provider to get screened for health problems  Your healthcare provider will also give you advice on how to stay healthy  Write down your questions so you remember to ask them  Ask your healthcare provider how often you should have a wellness visit  What happens at a wellness visit:  Your healthcare provider will ask about your health, and your family history of health problems  This includes high blood pressure, heart disease, and cancer  He or she will ask if you have symptoms that concern you, if you smoke, and about your mood  You may also be asked about your intake of medicines, supplements, food, and alcohol  Any of the following may be done: Your weight  will be checked  Your height may also be checked so your body mass index (BMI) can be calculated  Your BMI shows if you are at a healthy weight  Your blood pressure  and heart rate will be checked  Your temperature may also be checked  Blood and urine tests  may be done  Blood tests may be done to check your cholesterol levels  Abnormal cholesterol levels increase your risk for heart disease and stroke  You may also need a blood or urine test to check for diabetes if you are at increased risk  Urine tests may be done to look for signs of an infection or kidney disease  A physical exam  includes checking your heartbeat and lungs with a stethoscope  Your healthcare provider may also check your skin to look for sun damage  Screening tests  may be recommended  A screening test is done to check for diseases that may not cause symptoms  The screening tests you may need depend on your age, gender, family history, and lifestyle habits  For example, colorectal screening may be recommended if you are 48years old or older      Screening tests you need if you are a woman:   A Pap smear  is used to screen for cervical cancer  Pap smears are usually done every 3 to 5 years depending on your age  You may need them more often if you have had abnormal Pap smear test results in the past  Ask your healthcare provider how often you should have a Pap smear  A mammogram  is an x-ray of your breasts to screen for breast cancer  Experts recommend mammograms every 2 years starting at age 48 years  You may need a mammogram at age 52 years or younger if you have an increased risk for breast cancer  Talk to your healthcare provider about when you should start having mammograms and how often you need them  Vaccines you may need:   Get an influenza vaccine  every year  The influenza vaccine protects you from the flu  Several types of viruses cause the flu  The viruses change over time, so new vaccines are made each year  Get a tetanus-diphtheria (Td) booster vaccine  every 10 years  This vaccine protects you against tetanus and diphtheria  Tetanus is a severe infection that may cause painful muscle spasms and lockjaw  Diphtheria is a severe bacterial infection that causes a thick covering in the back of your mouth and throat  Get a human papillomavirus (HPV) vaccine  if you are female and aged 23 to 32 or male 23 to 24 and never received it  This vaccine protects you from HPV infection  HPV is the most common infection spread by sexual contact  HPV may also cause vaginal, penile, and anal cancers  Get a pneumococcal vaccine  if you are aged 72 years or older  The pneumococcal vaccine is an injection given to protect you from pneumococcal disease  Pneumococcal disease is an infection caused by pneumococcal bacteria  The infection may cause pneumonia, meningitis, or an ear infection  Get a shingles vaccine  if you are 60 or older, even if you have had shingles before  The shingles vaccine is an injection to protect you from the varicella-zoster virus  This is the same virus that causes chickenpox   Shingles is a painful rash that develops in people who had chickenpox or have been exposed to the virus  How to eat healthy:  My Plate is a model for planning healthy meals  It shows the types and amounts of foods that should go on your plate  Fruits and vegetables make up about half of your plate, and grains and protein make up the other half  A serving of dairy is included on the side of your plate  The amount of calories and serving sizes you need depends on your age, gender, weight, and height  Examples of healthy foods are listed below:  Eat a variety of vegetables  such as dark green, red, and orange vegetables  You can also include canned vegetables low in sodium (salt) and frozen vegetables without added butter or sauces  Eat a variety of fresh fruits , canned fruit in 100% juice, frozen fruit, and dried fruit  Include whole grains  At least half of the grains you eat should be whole grains  Examples include whole-wheat bread, wheat pasta, brown rice, and whole-grain cereals such as oatmeal     Eat a variety of protein foods such as seafood (fish and shellfish), lean meat, and poultry without skin (turkey and chicken)  Examples of lean meats include pork leg, shoulder, or tenderloin, and beef round, sirloin, tenderloin, and extra lean ground beef  Other protein foods include eggs and egg substitutes, beans, peas, soy products, nuts, and seeds  Choose low-fat dairy products such as skim or 1% milk or low-fat yogurt, cheese, and cottage cheese  Limit unhealthy fats  such as butter, hard margarine, and shortening  Exercise:  Exercise at least 30 minutes per day on most days of the week  Some examples of exercise include walking, biking, dancing, and swimming  You can also fit in more physical activity by taking the stairs instead of the elevator or parking farther away from stores  Include muscle strengthening activities 2 days each week  Regular exercise provides many health benefits   It helps you manage your weight, and decreases your risk for type 2 diabetes, heart disease, stroke, and high blood pressure  Exercise can also help improve your mood  Ask your healthcare provider about the best exercise plan for you  General health and safety guidelines:   Do not smoke  Nicotine and other chemicals in cigarettes and cigars can cause lung damage  Ask your healthcare provider for information if you currently smoke and need help to quit  E-cigarettes or smokeless tobacco still contain nicotine  Talk to your healthcare provider before you use these products  Limit alcohol  A drink of alcohol is 12 ounces of beer, 5 ounces of wine, or 1½ ounces of liquor  Lose weight, if needed  Being overweight increases your risk of certain health conditions  These include heart disease, high blood pressure, type 2 diabetes, and certain types of cancer  Protect your skin  Do not sunbathe or use tanning beds  Use sunscreen with a SPF 15 or higher  Apply sunscreen at least 15 minutes before you go outside  Reapply sunscreen every 2 hours  Wear protective clothing, hats, and sunglasses when you are outside  Drive safely  Always wear your seatbelt  Make sure everyone in your car wears a seatbelt  A seatbelt can save your life if you are in an accident  Do not use your cell phone when you are driving  This could distract you and cause an accident  Pull over if you need to make a call or send a text message  Practice safe sex  Use latex condoms if are sexually active and have more than one partner  Your healthcare provider may recommend screening tests for sexually transmitted infections (STIs)  Wear helmets, lifejackets, and protective gear  Always wear a helmet when you ride a bike or motorcycle, go skiing, or play sports that could cause a head injury  Wear protective equipment when you play sports  Wear a lifejacket when you are on a boat or doing water sports      © Copyright [employer]ative 2022 Information is for End User's use only and may not be sold, redistributed or otherwise used for commercial purposes  The above information is an  only  It is not intended as medical advice for individual conditions or treatments  Talk to your doctor, nurse or pharmacist before following any medical regimen to see if it is safe and effective for you

## 2023-06-13 NOTE — PROGRESS NOTES
[id number]    NAME: Mamie Baldwin  AGE: 56 y o  SEX: female  : 1975     DATE: 2023     Assessment and Plan:     Problem List Items Addressed This Visit     Recurrent depression ([address] )     Stable  Continue lexapro         Relevant Orders    Ambulatory Referral to Mame Calderón   Other Visit Diagnoses     Annual physical exam    -  Primary    Encounter for screening mammogram for breast cancer        Relevant Orders    Mammo screening bilateral w 3d & cad    Body dysmorphic disorder        Relevant Orders    Ambulatory Referral to [facility] and preventive care screenings were discussed with patient today  Appropriate education was printed on patient's after visit summary  Counseling:  Dental Health: discussed importance of regular tooth brushing, flossing, and dental visits  Exercise: the importance of regular exercise/physical activity was discussed  Recommend exercise 3-5 times per week for at least 30 minutes  Return in about 6 months (around 2023) for Next scheduled follow up  Chief Complaint:     Chief Complaint   Patient presents with   • Annual Exam     Lw cma      History of Present Illness:     Adult Annual Physical   Patient here for a comprehensive physical exam  The patient reports that she is still struggling with body dysmorphism   Diet and Physical Activity  Diet/Nutrition: well balanced diet  Exercise: walking        Depression Screening  PHQ-2/9 Depression Screening    Little interest or pleasure in doing things: 0 - not at all  Feeling down, depressed, or hopeless: 1 - several days  Trouble falling or staying asleep, or sleeping too much: 3 - nearly every day  Feeling tired or having little energy: 1 - several days  Poor appetite or overeatin - not at all  Feeling bad about yourself - or that you are a failure or have let yourself or your family down: 1 - several days  Trouble concentrating on things, such as reading the newspaper or watching television: 0 - not at all  Moving or speaking so slowly that other people could have noticed  Or the opposite - being so fidgety or restless that you have been moving around a lot more than usual: 0 - not at all  Thoughts that you would be better off dead, or of hurting yourself in some way: 0 - not at all  PHQ-9 Score: 6   PHQ-9 Interpretation: Mild depression        General Health  Sleep: she has a hard time staying asleep  Hearing: hearing loss on the right side  Vision: wears glasses  Dental: no dental visits for >1 year  /GYN Health  Has IUD, following with gyn     Review of Systems:     Review of Systems   Constitutional: Negative  Respiratory: Negative  Cardiovascular: Negative         Past Medical History:     Past Medical History:   Diagnosis Date   • COVID-19 vaccine series completed    • Depression    • Fractures 10/1993    5th metatarsal rt   • Hyperlipidemia     recent dx 8/21   • Morbid obesity with BMI of 40 0-44 9, adult ([facility] [address] )     EGD in preparation for gastric bypass surgery   • Wears glasses       Past Surgical History:     Past Surgical History:   Procedure Laterality Date   • BREAST BIOPSY Right     benign- in her 30's   • CHOLECYSTECTOMY  02/19/2015    [facility]   • FERTILITY SURGERY     • LASER ABLATION  09/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;  Service: Bariatrics   • WISDOM TOOTH EXTRACTION        Social History:     Social History     Socioeconomic History   • Marital status: /Civil Union     Spouse name: None   • Number of children: None   • Years of education: None   • Highest education level: None   Occupational History   • None   Tobacco Use • Smoking status: Never   • Smokeless tobacco: Never   Vaping Use   • Vaping Use: Never used   Substance and Sexual Activity   • Alcohol use: Not Currently     Alcohol/week: 0 0 standard drinks of alcohol     Comment: Very rare, on special occasions   • Drug use: No   • Sexual activity: Yes     Partners: Male     Birth control/protection: I U D       Comment: Mirena since    Other Topics Concern   • None   Social History Narrative   • None     Social Determinants of Health     Financial Resource Strain: Not on file   Food Insecurity: Not on file   Transportation Needs: Not on file   Physical Activity: Not on file   Stress: Not on file   Social Connections: Not on file   Intimate Partner Violence: Not on file   Housing Stability: Not on file      Family History:     Family History   Problem Relation Age of Onset   • Vision loss Mother    • Breast cancer Mother 58   • Cancer Mother         Breast, very early stage, tumor removed   • Osteoporosis Mother         On Prolia injections   • Stroke Father    • Diabetes Father         type 2   • Asthma Sister    • Scoliosis Sister         Spondyloliyhesis   • ADD / ADHD Daughter    • Melanoma Maternal Grandmother    • Cancer Maternal Grandmother         Metastic Skin Cancer     • Learning disabilities Brother         Passed in    • Learning disabilities Brother         [de-identified] brother lives in [place]   • Hepatitis Maternal Aunt       Current Medications:     Current Outpatient Medications   Medication Sig Dispense Refill   • Calcium-Vitamin A-Vitamin D (LIQUID CALCIUM PO) Take 1,500 mg by mouth daily     • ergocalciferol (VITAMIN D2) 50,000 units Take 1 capsule (50,000 Units total) by mouth 2 (two) times a week with meals 16 capsule 0   • escitalopram (LEXAPRO) 20 mg tablet Take 1 tablet (20 mg total) by mouth daily 90 tablet 1   • fluticasone (FLONASE) 50 mcg/act nasal spray 2 sprays into each nostril daily (Patient taking differently: 2 sprays into each nostril as "needed) 16 g 3   • levonorgestrel (MIRENA) 20 MCG/24HR IUD 1 each by Intrauterine route once     • multivitamin (THERAGRAN) TABS Take 1 tablet by mouth daily     • polyethylene glycol-electrolytes (NULYTELY) 4000 mL solution Take 4,000 mL by mouth once for 1 dose 4000 mL 0     No current facility-administered medications for this visit  Allergies:     No Known Allergies   Physical Exam:     /72   Pulse 68   Temp 99 8 °F (37 7 °C) (Tympanic)   Resp 18   Ht 5' 2\" (1 575 m)   Wt 83 1 kg (183 lb 2 oz)   BMI 33 49 kg/m²     Physical Exam  Vitals and nursing note reviewed  Constitutional:       Appearance: She is well-developed  HENT:      Head: Normocephalic and atraumatic  Right Ear: External ear normal       Left Ear: External ear normal       Nose: Nose normal    Cardiovascular:      Rate and Rhythm: Normal rate and regular rhythm  Heart sounds: Normal heart sounds  No murmur heard  No friction rub  Pulmonary:      Effort: No respiratory distress  Breath sounds: Normal breath sounds  No wheezing or rales  Abdominal:      Palpations: Abdomen is soft  Tenderness: There is no abdominal tenderness  Musculoskeletal:      Right lower leg: No edema  Left lower leg: No edema  Neurological:      Mental Status: She is oriented to person, place, and time  Cranial Nerves: No cranial nerve deficit          Vision Screening    Right eye Left eye Both eyes   Without correction      With correction 20/40 20/25 20/25       Nirmal Floyd, [address]  "

## 2023-06-19 ENCOUNTER — TELEPHONE (OUTPATIENT)
Dept: GASTROENTEROLOGY | Facility: CLINIC | Age: 48
End: 2023-06-19

## 2023-06-19 NOTE — TELEPHONE ENCOUNTER
lmom confirming pt's colonoscopy scheduled on 6/26/23 at [facility] with Dr Yemi Cuevas   Informed [facility] would be calling the day prior with the arrival time  Informed of clear liquid diet day prior as well as the bowel cleansing preparation  Informed would need a  the day of the procedure due to being under sedation  I asked pt to please call back if has not received instructions or if has any questions

## 2023-06-20 ENCOUNTER — TELEPHONE (OUTPATIENT)
Dept: PSYCHIATRY | Facility: CLINIC | Age: 48
End: 2023-06-20

## 2023-06-20 NOTE — TELEPHONE ENCOUNTER
Contacted patient in regards to Routine Referral in attempts to verify patient's needs of services and add patient to proper wait list  LVM for patient to contact intake dept  in regards to

## 2023-06-21 NOTE — TELEPHONE ENCOUNTER
Pt requesting a call back regarding prep instructions  She is a bariatric pt and she is stating Dr Baylee Obregon told her to take her prep in half  Pt wants to confirm if this information is correct   Please reach out to pt at [phone]

## 2023-06-22 DIAGNOSIS — F41.9 ANXIETY: ICD-10-CM

## 2023-06-22 RX ORDER — ESCITALOPRAM OXALATE 20 MG/1
20 TABLET ORAL DAILY
Qty: 90 TABLET | Refills: 0 | Status: SHIPPED | OUTPATIENT
Start: 2023-06-22

## 2023-06-23 ENCOUNTER — TELEPHONE (OUTPATIENT)
Dept: PSYCHIATRY | Facility: CLINIC | Age: 48
End: 2023-06-23

## 2023-06-26 ENCOUNTER — ANESTHESIA (OUTPATIENT)
Dept: GASTROENTEROLOGY | Facility: AMBULARY SURGERY CENTER | Age: 48
End: 2023-06-26

## 2023-06-26 ENCOUNTER — ANESTHESIA EVENT (OUTPATIENT)
Dept: GASTROENTEROLOGY | Facility: AMBULARY SURGERY CENTER | Age: 48
End: 2023-06-26

## 2023-06-26 ENCOUNTER — HOSPITAL ENCOUNTER (OUTPATIENT)
Dept: GASTROENTEROLOGY | Facility: AMBULARY SURGERY CENTER | Age: 48
Setting detail: OUTPATIENT SURGERY
Discharge: HOME | End: 2023-06-26
Attending: INTERNAL MEDICINE | Admitting: INTERNAL MEDICINE
Payer: COMMERCIAL

## 2023-06-26 VITALS
WEIGHT: 180 LBS | TEMPERATURE: 95.5 F | HEART RATE: 53 BPM | SYSTOLIC BLOOD PRESSURE: 129 MMHG | RESPIRATION RATE: 18 BRPM | OXYGEN SATURATION: 97 % | DIASTOLIC BLOOD PRESSURE: 72 MMHG | BODY MASS INDEX: 32.92 KG/M2

## 2023-06-26 DIAGNOSIS — Z12.11 SCREENING FOR COLON CANCER: ICD-10-CM

## 2023-06-26 LAB
EXT PREGNANCY TEST URINE: NEGATIVE
EXT. CONTROL: NORMAL

## 2023-06-26 PROCEDURE — 81025 URINE PREGNANCY TEST: CPT | Performed by: ANESTHESIOLOGY

## 2023-06-26 PROCEDURE — 45380 COLONOSCOPY AND BIOPSY: CPT | Performed by: INTERNAL MEDICINE

## 2023-06-26 PROCEDURE — 88305 TISSUE EXAM BY PATHOLOGIST: CPT | Performed by: PATHOLOGY

## 2023-06-26 RX ORDER — LIDOCAINE HYDROCHLORIDE 10 MG/ML
INJECTION, SOLUTION EPIDURAL; INFILTRATION; INTRACAUDAL; PERINEURAL AS NEEDED
Status: DISCONTINUED | OUTPATIENT
Start: 2023-06-26 | End: 2023-06-26

## 2023-06-26 RX ORDER — PROPOFOL 10 MG/ML
INJECTION, EMULSION INTRAVENOUS CONTINUOUS PRN
Status: DISCONTINUED | OUTPATIENT
Start: 2023-06-26 | End: 2023-06-26

## 2023-06-26 RX ORDER — PROPOFOL 10 MG/ML
INJECTION, EMULSION INTRAVENOUS AS NEEDED
Status: DISCONTINUED | OUTPATIENT
Start: 2023-06-26 | End: 2023-06-26

## 2023-06-26 RX ORDER — SODIUM CHLORIDE, SODIUM LACTATE, POTASSIUM CHLORIDE, CALCIUM CHLORIDE 600; 310; 30; 20 MG/100ML; MG/100ML; MG/100ML; MG/100ML
125 INJECTION, SOLUTION INTRAVENOUS CONTINUOUS
Status: DISCONTINUED | OUTPATIENT
Start: 2023-06-26 | End: 2023-06-30 | Stop reason: HOSPADM

## 2023-06-26 RX ADMIN — SODIUM CHLORIDE, SODIUM LACTATE, POTASSIUM CHLORIDE, AND CALCIUM CHLORIDE 125 ML/HR: .6; .31; .03; .02 INJECTION, SOLUTION INTRAVENOUS at 12:47

## 2023-06-26 RX ADMIN — PROPOFOL 20 MG: 10 INJECTION, EMULSION INTRAVENOUS at 13:15

## 2023-06-26 RX ADMIN — PROPOFOL 100 MG: 10 INJECTION, EMULSION INTRAVENOUS at 13:07

## 2023-06-26 RX ADMIN — LIDOCAINE HYDROCHLORIDE 50 MG: 10 INJECTION, SOLUTION EPIDURAL; INFILTRATION; INTRACAUDAL; PERINEURAL at 13:07

## 2023-06-26 RX ADMIN — PROPOFOL 30 MG: 10 INJECTION, EMULSION INTRAVENOUS at 13:08

## 2023-06-26 RX ADMIN — PROPOFOL 120 MCG/KG/MIN: 10 INJECTION, EMULSION INTRAVENOUS at 13:07

## 2023-06-26 NOTE — ANESTHESIA PREPROCEDURE EVALUATION
Procedure:  COLONOSCOPY    Relevant Problems   CARDIO   (+) Mixed hyperlipidemia      NEURO/PSYCH   (+) Anxiety   (+) Recurrent depression (HCC)        Physical Exam    Airway    Mallampati score: II  TM Distance: >3 FB  Neck ROM: full     Dental   No notable dental hx     Cardiovascular  Cardiovascular exam normal    Pulmonary  Pulmonary exam normal     Other Findings        Anesthesia Plan  ASA Score- 2     Anesthesia Type- IV sedation with anesthesia with ASA Monitors  Additional Monitors:   Airway Plan:           Plan Factors-Exercise tolerance (METS): >4 METS  Chart reviewed  Existing labs reviewed  Patient summary reviewed  Patient is not a current smoker  Obstructive sleep apnea risk education given perioperatively  Induction-     Postoperative Plan-     Informed Consent- Anesthetic plan and risks discussed with patient  I personally reviewed this patient with the CRNA  Discussed and agreed on the Anesthesia Plan with the CRNA  Yossi Rivas

## 2023-06-26 NOTE — INTERVAL H&P NOTE
H&P reviewed  After examining the patient I find no changes in the patients condition since the H&P had been written      Vitals:    06/26/23 1148   BP: 127/60   Pulse: (!) 51   Resp: 18   Temp: (!) 95 5 °F (35 3 °C)   SpO2: 100%

## 2023-06-26 NOTE — ANESTHESIA POSTPROCEDURE EVALUATION
Post-Op Assessment Note    CV Status:  Stable  Pain Score: 0    Pain management: adequate     Mental Status:  Alert and awake   Hydration Status:  Stable   PONV Controlled:  None   Airway Patency:  Patent      Post Op Vitals Reviewed: Yes      Staff: Anesthesiologist, CRNA         No notable events documented      BP   104/67   Temp     Pulse 51   Resp   14   SpO2   98

## 2023-06-29 PROCEDURE — 88305 TISSUE EXAM BY PATHOLOGIST: CPT | Performed by: PATHOLOGY

## 2023-07-11 ENCOUNTER — OFFICE VISIT (OUTPATIENT)
Dept: BARIATRICS | Facility: CLINIC | Age: 48
End: 2023-07-11

## 2023-07-11 VITALS — WEIGHT: 181.8 LBS | HEIGHT: 62 IN | BODY MASS INDEX: 33.45 KG/M2

## 2023-07-11 DIAGNOSIS — K91.2 POSTSURGICAL MALABSORPTION: Primary | ICD-10-CM

## 2023-07-11 PROCEDURE — RECHECK
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
## 2023-07-24 ENCOUNTER — OFFICE VISIT (OUTPATIENT)
Dept: BARIATRICS | Facility: CLINIC | Age: 48
End: 2023-07-24

## 2023-07-24 VITALS — BODY MASS INDEX: 33.72 KG/M2 | WEIGHT: 182.6 LBS

## 2023-07-24 DIAGNOSIS — Z48.815 ENCOUNTER FOR SURGICAL AFTERCARE FOLLOWING SURGERY ON THE DIGESTIVE SYSTEM: Primary | ICD-10-CM

## 2023-07-24 PROCEDURE — RECHECK
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
## 2023-07-24 NOTE — PATIENT INSTRUCTIONS
- rearrange kitchen and pantry, put temtping foods more out of the way and more healthy snacks and foods accessible  - bring hand weights and resistance bands into work space as non-food coping skill  - complete 5-5-5-5-5 Exercise to identity alternative coping skills, hang it up for easy reference and share it with support people  - connect with bariatric community on Facebook and support group  - make contact with [address] [place] [address] about Therapy Anywhere

## 2023-08-16 ENCOUNTER — OFFICE VISIT (OUTPATIENT)
Dept: BARIATRICS | Facility: CLINIC | Age: 48
End: 2023-08-16

## 2023-08-16 VITALS — BODY MASS INDEX: 33.79 KG/M2 | HEIGHT: 62 IN | WEIGHT: 183.6 LBS

## 2023-08-16 DIAGNOSIS — K91.2 POSTSURGICAL MALABSORPTION: Primary | ICD-10-CM

## 2023-08-16 PROCEDURE — RECHECK

## 2023-08-16 NOTE — PROGRESS NOTES
Bariatric Follow Up Nutrition Note    Type of surgery  Gastric bypass: laparoscopic  Surgery Date: 11/29/21  21 months  post-op  Surgeon: Dr Raquel Hernández  56 y.o.  female    Ht:  61.7"  Weight (last 2 days)     Date/Time Weight    08/16/23 1134 83.3 (183.6)        Body mass index is 33.91 kg/m².     Initial:  241.2#  Weight on Day of Weight Loss Surgery: 219#  Weight in (lb) to have BMI = 25: 136#  Pre-Op Excess Wt: 105.2#  Gama:  168# at 6 months post-op  Regain:  15lb regain in the past 8 months  Post-Op Wt Loss: 57.5#/ 55% EBWL in 21 month(s)    Review of History and Medications   Past Medical History:   Diagnosis Date   • Colon cancer screening    • COVID-19 vaccine series completed    • Depression    • Fractures 10/1993    5th metatarsal rt   • Hyperlipidemia     recent dx 8/21   • Wears glasses    • Wears glasses      Past Surgical History:   Procedure Laterality Date   • BREAST BIOPSY Right     benign- in her 29's   • CHOLECYSTECTOMY  02/19/2015    [facility]   • FERTILITY SURGERY     • LASER ABLATION  09/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   • OH LAPS GSTR RSTCV PX W/BYP MIGUEL-EN-Y LIMB <150 CM N/A 11/29/2021    Procedure: LAPAROSCOPIC MIGUEL-EN-Y GASTRIC BYPASS AND INTRAOPERATIVE EGD;  Surgeon: Fernanda Salinas MD;  Location: [facility];  Service: Bariatrics   • WISDOM TOOTH EXTRACTION       Social History     Socioeconomic History   • Marital status: /Civil Union     Spouse name: Not on file   • Number of children: Not on file   • Years of education: Not on file   • Highest education level: Not on file   Occupational History   • Not on file   Tobacco Use   • Smoking status: Never   • Smokeless tobacco: Never   Vaping Use   • Vaping Use: Never used   Substance and Sexual Activity   • Alcohol use: Not Currently     Alcohol/week: 0.0 standard drinks of alcohol     Comment: Very rare, on special occasions   • Drug use: No   • Sexual activity: Yes Partners: Male     Birth control/protection: I.U.D.      Comment: Mirena since 2009   Other Topics Concern   • Not on file   Social History Narrative   • Not on file     Social Determinants of Health     Financial Resource Strain: Not on file   Food Insecurity: Not on file   Transportation Needs: Not on file   Physical Activity: Not on file   Stress: Not on file   Social Connections: Not on file   Intimate Partner Violence: Not on file   Housing Stability: Not on file       Current Outpatient Medications:   •  escitalopram (LEXAPRO) 20 mg tablet, Take 1 tablet (20 mg total) by mouth daily, Disp: 90 tablet, Rfl: 0  •  Calcium-Vitamin A-Vitamin D (LIQUID CALCIUM PO), Take 1,500 mg by mouth daily, Disp: , Rfl:   •  fluticasone (FLONASE) 50 mcg/act nasal spray, 2 sprays into each nostril daily (Patient taking differently: 2 sprays into each nostril as needed), Disp: 16 g, Rfl: 3  •  levonorgestrel (MIRENA) 20 MCG/24HR IUD, 1 each by Intrauterine route once, Disp: , Rfl:   •  multivitamin (THERAGRAN) TABS, Take 1 tablet by mouth daily, Disp: , Rfl:     Food Intake and Lifestyle Assessment   Food Intake Assessment completed via usual diet recall    Breakfast: protein shake-2 scoops of protein powder (110 cals, 30gm pro)--most often at breakfast but if doesn't have as breakfast will still have sometime during the day OR apple and PB (2tbsp)   Snack: apple and PB if didn't have for breakfast  Lunch: 12pm-when at the office will have salad bar with chicken and boiled egg (low fat Manjit) or just the grilled chicken, boiled egg, and chick pea w/o the lettuce from the salad bar or grilled chicken buffalo flat bread (whole=420 cals, usually doing 1/2 about 220cals) OR when at home will just have a yogurt (sometiemsm with 1/3 cup of granola) OR leftovers (turkey burger often)  Snack: occasional greek yogurt  Dinner: 5-6pm-Turkey burger on spinach wrap or turkey chili or chicken w/veggies or seafood--salmon or shrimp or just the beef from hamburger helper  Snack: -    Beverage intake: water  Diet texture/stage: regular  Protein supplement: nutracost protein powder (1 scoop=130cals, 30gm protein, 1gm carb) + Purium powder shake (2 scoops apple berry= 140cals, 7gm protein, 15gm carbs, 5gm fiber)  Estimated protein intake per day: 60-90gm  Estimated fluid intake per day: 48oz water from circul bottle for sure, working on getting 64oz  Meals eaten away from home: varies  Typical meal pattern: 3 meals per day and grazes at times on snacks  Eating Behaviors: Appropriate diet advancement, Frequent snacking/ grazing, Mindless eating, Emotional eating, Craves sweet foods and Craves salty foods    Food allergies or intolerances: ice cream  Cultural or Episcopalian considerations: -    Vitamins:  Bariatric advantage EA 2 per day  BA chewy calcium 1 TID    Physical Assessment  Nutrition Related Findings  Dumping with ice cream    Physical Activity  Types of exercise: Walking on the treadmill--30 mins 2 miles 2x per week  Current physical limitations: -    Psychosocial Assessment   Support systems: spouse and children friend(s) relative(s)  Socioeconomic factors: -    Nutrition Diagnosis  Diagnosis: Inappropriate intake of carbohydrates (NI-5.8.3) and Inadequate protein intake (NI-5.7. 3)  Related to: Excessive energy intake  As Evidenced by: Unintentional weight gain     Interventions and Teaching   Patient educated on post-op nutrition guidelines. Patient educated and handouts provided.   Adequate hydration  Expected weight loss  Weight loss plateaus/ possibility of weight regain  Exercise  Nutrition considerations after surgery  Protein supplements  Meal planning and preparation  Appropriate carbohydrate, protein, and fat intake, and food/fluid choices to maximize safe weight loss, nutrient intake, and tolerance   Dietary and lifestyle changes  Possible problems with poor eating habits  Intuitive eating  Techniques for self monitoring and keeping daily food journal  Vitamin / Mineral supplementation of Multivitamin with minerals and Calcium    Education provided to: patient    Barriers to learning: No barriers identified    Readiness to change: action and relapsing    Comprehension: verbalizes understanding     Expected Compliance: good    Pt presents today at 21 months s/p RYGB. Pt feels she is in a better mental space today, despite a 1# weight gain from last visit. She met with SW a few weeks ago and worked on some of her suggestions to include posting on the [employer]. Posting on the FB page and seeing the responses made her realize she was not the only one struggling. She has tried some of the different ideas we came up with at our last visit, but still struggles to log regularly. Encouraged at least 3-4 days per week to get a feel for her calorie and protein intake. Pt feels the accountability is beneficial to her therefore will alternate seeing SW and RD, seeing the SW next month.      Goals  Food journal via [employer]--Aim for 3-4 times per week  1200 cals (macros:  40% carbs, 30-35% fat, 25-30% protein)  Exercise 30 minutes 5 times per week--increase walking outside--suggest listen to music or audio books  Eat 3 meals per day with protein at each meal  Continue to avoid the mindless snacking and use the portion control snacks  F/U with SW in one month     Time Spent:   30 Minutes

## 2023-09-17 ENCOUNTER — APPOINTMENT (OUTPATIENT)
Dept: RADIOLOGY | Facility: CLINIC | Age: 48
End: 2023-09-17
Payer: COMMERCIAL

## 2023-09-17 ENCOUNTER — OFFICE VISIT (OUTPATIENT)
Dept: URGENT CARE | Facility: CLINIC | Age: 48
End: 2023-09-17
Payer: COMMERCIAL

## 2023-09-17 VITALS
BODY MASS INDEX: 35.68 KG/M2 | HEIGHT: 61 IN | WEIGHT: 189 LBS | TEMPERATURE: 98 F | RESPIRATION RATE: 18 BRPM | HEART RATE: 76 BPM | OXYGEN SATURATION: 98 %

## 2023-09-17 DIAGNOSIS — S99.922S TOE INJURY, LEFT, SEQUELA: ICD-10-CM

## 2023-09-17 DIAGNOSIS — M79.675 PAIN IN LEFT TOE(S): Primary | ICD-10-CM

## 2023-09-17 DIAGNOSIS — S99.921S TOE INJURY, RIGHT, SEQUELA: ICD-10-CM

## 2023-09-17 PROCEDURE — 99213 OFFICE O/P EST LOW 20 MIN: CPT | Performed by: PHYSICIAN ASSISTANT

## 2023-09-17 PROCEDURE — 73660 X-RAY EXAM OF TOE(S): CPT

## 2023-09-17 NOTE — PROGRESS NOTES
[facility]        NAME: Jeremy Johnson is a 56 y.o. female  : 1975    MRN: [id number]  DATE: 2023  TIME: 1:35 PM    Assessment and Plan   Pain in left toe(s) [M79.675]  1. Pain in left toe(s)  CANCELED: XR toe right great min 2 view        Patient Instructions   Left toe pain  xrays negative  RICE- rest, ice (20 min on, 20 min off), compression with ace bandage, and elevation while at home. Buddy tape to adjacent toe  Routine wound care to cut  Tylenol/ibuprofen as needed for pain    Follow up with PCP in 3-5 days. Proceed to  ER if symptoms worsen. Chief Complaint     Chief Complaint   Patient presents with   • Toe Injury     Dropped a can of beans on great rt toe has small laceration but extreme pain         History of Present Illness       Rosie Grimaldo is a 69-year-old female who presents to clinic complaining of left big toe pain and small laceration. She states that today she dropped a can of beans onto her toe. She notes pain and swelling but denies any bruising. Small laceration has stopped bleeding. She is able to bear weight but with increased pain with movement. Denies any numbness. Review of Systems   Review of Systems   Constitutional: Negative. Musculoskeletal: Positive for arthralgias and joint swelling. Negative for gait problem. Skin: Positive for wound. Negative for color change. Neurological: Negative for numbness.          Current Medications       Current Outpatient Medications:   •  Calcium-Vitamin A-Vitamin D (LIQUID CALCIUM PO), Take 1,500 mg by mouth daily, Disp: , Rfl:   •  escitalopram (LEXAPRO) 20 mg tablet, Take 1 tablet (20 mg total) by mouth daily, Disp: 90 tablet, Rfl: 0  •  levonorgestrel (MIRENA) 20 MCG/24HR IUD, 1 each by Intrauterine route once, Disp: , Rfl:   •  multivitamin (THERAGRAN) TABS, Take 1 tablet by mouth daily, Disp: , Rfl:   •  fluticasone (FLONASE) 50 mcg/act nasal spray, 2 sprays into each nostril daily (Patient taking differently: 2 sprays into each nostril as needed), Disp: 16 g, Rfl: 3    Current Allergies     Allergies as of 2023   • (No Known Allergies)            The following portions of the patient's history were reviewed and updated as appropriate: allergies, current medications, past family history, past medical history, past social history, past surgical history and problem list.     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   • [place] LAPS GSTR RSTCV PX W/BYP MIGUEL-EN-Y LIMB <150 CM N/A 2021    Procedure: LAPAROSCOPIC MIGUEL-EN-Y GASTRIC BYPASS AND INTRAOPERATIVE EGD;  Surgeon: Joseline Jacques MD;  [facility];  Service: Bariatrics   • WISDOM TOOTH EXTRACTION         Family History   Problem Relation Age of Onset   • Vision loss Mother    • Breast cancer Mother 58   • Cancer Mother         Breast, very early stage, tumor removed   • Osteoporosis Mother         On Prolia injections   • Stroke Father    • Diabetes Father         type 2   • Asthma Sister    • Scoliosis Sister         Spondyloliyhesis   • ADD / ADHD Daughter    • Melanoma Maternal Grandmother    • Cancer Maternal Grandmother         Metastic Skin Cancer     • Learning disabilities Brother         Passed in    • Learning disabilities Brother         Half brother lives in [place]   • Hepatitis Maternal Aunt          Medications have been verified.         Objective   Pulse 76   Temp 98 °F (36.7 °C)   Resp 18   Ht 5' 1" (1.549 m)   Wt 85.7 kg (189 lb)   SpO2 98%   BMI 35.71 kg/m² No LMP recorded. Patient has had an implant. Physical Exam     Physical Exam  Vitals and nursing note reviewed. Constitutional:       General: She is not in acute distress. Appearance: Normal appearance. She is not ill-appearing. Pulmonary:      Effort: Pulmonary effort is normal.   Musculoskeletal:      Left foot: Decreased range of motion. Normal capillary refill. Swelling, laceration (superficial, cleansed and dressed, no active bleeding), tenderness and bony tenderness present. No deformity or crepitus. Neurological:      Mental Status: She is alert and oriented to person, place, and time.    Psychiatric:         Mood and Affect: Mood normal.         Behavior: Behavior normal.

## 2023-09-18 ENCOUNTER — OFFICE VISIT (OUTPATIENT)
Dept: BARIATRICS | Facility: CLINIC | Age: 48
End: 2023-09-18

## 2023-09-18 VITALS — BODY MASS INDEX: 35.56 KG/M2 | WEIGHT: 188.2 LBS

## 2023-09-18 DIAGNOSIS — Z48.815 ENCOUNTER FOR SURGICAL AFTERCARE FOLLOWING SURGERY ON THE DIGESTIVE SYSTEM: Primary | ICD-10-CM

## 2023-09-18 PROCEDURE — RECHECK
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
## 2023-11-25 ENCOUNTER — APPOINTMENT (OUTPATIENT)
Dept: LAB | Facility: HOSPITAL | Age: 48
End: 2023-11-25
Payer: COMMERCIAL

## 2023-11-25 DIAGNOSIS — K91.2 POSTSURGICAL MALABSORPTION: ICD-10-CM

## 2023-11-25 LAB
25(OH)D3 SERPL-MCNC: 23.7 NG/ML (ref 30–100)
ALBUMIN SERPL BCP-MCNC: 4.1 G/DL (ref 3.5–5)
ALP SERPL-CCNC: 78 U/L (ref 34–104)
ALT SERPL W P-5'-P-CCNC: 14 U/L (ref 7–52)
ANION GAP SERPL CALCULATED.3IONS-SCNC: 8 MMOL/L
AST SERPL W P-5'-P-CCNC: 13 U/L (ref 13–39)
BILIRUB SERPL-MCNC: 0.62 MG/DL (ref 0.2–1)
BUN SERPL-MCNC: 13 MG/DL (ref 5–25)
CALCIUM SERPL-MCNC: 9.2 MG/DL (ref 8.4–10.2)
CHLORIDE SERPL-SCNC: 108 MMOL/L (ref 96–108)
CHOLEST SERPL-MCNC: 190 MG/DL
CO2 SERPL-SCNC: 23 MMOL/L (ref 21–32)
CREAT SERPL-MCNC: 0.67 MG/DL (ref 0.6–1.3)
ERYTHROCYTE [DISTWIDTH] IN BLOOD BY AUTOMATED COUNT: 12.9 % (ref 11.6–15.1)
FERRITIN SERPL-MCNC: 50 NG/ML (ref 11–307)
FOLATE SERPL-MCNC: 21.1 NG/ML
GFR SERPL CREATININE-BSD FRML MDRD: 105 ML/MIN/1.73SQ M
GLUCOSE P FAST SERPL-MCNC: 84 MG/DL (ref 65–99)
HCT VFR BLD AUTO: 43.9 % (ref 34.8–46.1)
HDLC SERPL-MCNC: 76 MG/DL
HGB BLD-MCNC: 14.8 G/DL (ref 11.5–15.4)
IRON SATN MFR SERPL: 23 % (ref 15–50)
IRON SERPL-MCNC: 82 UG/DL (ref 50–212)
LDLC SERPL CALC-MCNC: 96 MG/DL (ref 0–100)
MCH RBC QN AUTO: 32.6 PG (ref 26.8–34.3)
MCHC RBC AUTO-ENTMCNC: 33.7 G/DL (ref 31.4–37.4)
MCV RBC AUTO: 97 FL (ref 82–98)
NONHDLC SERPL-MCNC: 114 MG/DL
PLATELET # BLD AUTO: 277 THOUSANDS/UL (ref 149–390)
PMV BLD AUTO: 11.1 FL (ref 8.9–12.7)
POTASSIUM SERPL-SCNC: 4.1 MMOL/L (ref 3.5–5.3)
PROT SERPL-MCNC: 6.7 G/DL (ref 6.4–8.4)
PTH-INTACT SERPL-MCNC: 73.2 PG/ML (ref 12–88)
RBC # BLD AUTO: 4.54 MILLION/UL (ref 3.81–5.12)
SODIUM SERPL-SCNC: 139 MMOL/L (ref 135–147)
TIBC SERPL-MCNC: 356 UG/DL (ref 250–450)
TRIGL SERPL-MCNC: 89 MG/DL
UIBC SERPL-MCNC: 274 UG/DL (ref 155–355)
VIT B12 SERPL-MCNC: 310 PG/ML (ref 180–914)
WBC # BLD AUTO: 8.27 THOUSAND/UL (ref 4.31–10.16)

## 2023-11-25 PROCEDURE — 84425 ASSAY OF VITAMIN B-1: CPT

## 2023-11-25 PROCEDURE — 83550 IRON BINDING TEST: CPT

## 2023-11-25 PROCEDURE — 83970 ASSAY OF PARATHORMONE: CPT

## 2023-11-25 PROCEDURE — 82728 ASSAY OF FERRITIN: CPT

## 2023-11-25 PROCEDURE — 82746 ASSAY OF FOLIC ACID SERUM: CPT

## 2023-11-25 PROCEDURE — 84590 ASSAY OF VITAMIN A: CPT

## 2023-11-25 PROCEDURE — 82607 VITAMIN B-12: CPT

## 2023-11-25 PROCEDURE — 85027 COMPLETE CBC AUTOMATED: CPT

## 2023-11-25 PROCEDURE — 80061 LIPID PANEL: CPT

## 2023-11-25 PROCEDURE — 80053 COMPREHEN METABOLIC PANEL: CPT

## 2023-11-25 PROCEDURE — 84630 ASSAY OF ZINC: CPT

## 2023-11-25 PROCEDURE — 82306 VITAMIN D 25 HYDROXY: CPT

## 2023-11-25 PROCEDURE — 83540 ASSAY OF IRON: CPT

## 2023-11-25 PROCEDURE — 36415 COLL VENOUS BLD VENIPUNCTURE: CPT

## 2023-11-28 LAB
VIT A SERPL-MCNC: 26.4 UG/DL (ref 20.1–62)
VIT B1 BLD-SCNC: 158.1 NMOL/L (ref 66.5–200)

## 2023-11-30 ENCOUNTER — TELEPHONE (OUTPATIENT)
Dept: BARIATRICS | Facility: CLINIC | Age: 48
End: 2023-11-30

## 2023-11-30 NOTE — TELEPHONE ENCOUNTER
Called and spoke to pt and reviewed blood work form 11/25. Noted low vitamin D. Advised will have Sheba Townsend write a Vitamin D rx. Also advised to add an additional 1000mcg B12 sublingual since B12 <400. All other labs WNL.

## 2023-12-05 LAB — ZINC SERPL-MCNC: 66 UG/DL (ref 44–115)

## 2023-12-05 NOTE — ASSESSMENT & PLAN NOTE
-s/p Sachin-En-Y Gastric Bypass with Dr. Kira Adler on 11/29/21. Overall doing well - but rebound of 16lbs in the last year. She has been regularly following with RD and LCSW. Encouraged continued f/u and consider MWM for additional support. Initial: 241.2lbs  Current: 189.4lbs  EWL: 49%  Gama: 168lbs 6 months post op  Current BMI is Body mass index is 34.98 kg/m². Tolerating a regular diet-yes  Eating at least 60 grams of protein per day-yes  Following 30/60 minute rule with liquids-yes  Drinking at least 64 ounces of fluid per day-no; advised her to increase to goal  Drinking carbonated beverages-no  Sufficient exercise-yes  Using NSAIDs regularly-no  Using nicotine-no  Using alcohol-no.  Advised about the risks of alcohol s/p bariatric surgery and recommend avoiding all alcohol

## 2023-12-05 NOTE — ASSESSMENT & PLAN NOTE
-Avoid fried foods and trans fat, limit saturated fats and refined carbohydrates  -Increase fish/omega 3 FA consumption  -Increase physical activity  -Lipids WNL on recent labs

## 2023-12-05 NOTE — ASSESSMENT & PLAN NOTE
-At risk for malabsorption of vitamins/minerals secondary to malabsorption and restriction of intake from bariatric surgery  -Currently taking adequate postop bariatric surgery vitamin supplementation: Bariatric MVI w/ 45mg, calcium citrate 500mg TID  -Labs 11/25/23: Vitamin D low (23) - Rx 50,000IU 2x/week x 8 weeks, B12 mildly low - add 1,000mcg daily - repeat both in 3 months, Ferritin dropped to 50 - repeat in 6 months    -Repeat entire CBC/Metabolic panel in 1 year  -Patient received education about the importance of adhering to a lifelong supplementation regimen to avoid vitamin/mineral deficiencies

## 2023-12-07 ENCOUNTER — TELEPHONE (OUTPATIENT)
Dept: BARIATRICS | Facility: CLINIC | Age: 48
End: 2023-12-07

## 2023-12-07 DIAGNOSIS — R79.89 LOW VITAMIN B12 LEVEL: ICD-10-CM

## 2023-12-07 DIAGNOSIS — E55.9 VITAMIN D INSUFFICIENCY: ICD-10-CM

## 2023-12-07 DIAGNOSIS — K91.2 POSTSURGICAL MALABSORPTION: Primary | ICD-10-CM

## 2023-12-07 RX ORDER — ERGOCALCIFEROL 1.25 MG/1
50000 CAPSULE ORAL
Qty: 16 CAPSULE | Refills: 0 | Status: SHIPPED | OUTPATIENT
Start: 2023-12-07

## 2023-12-07 NOTE — RESULT ENCOUNTER NOTE
LMOM stated Celena message regarding labs. Stated detailed message from Karen Herrera can be found under Labs in [facility].  Requested CB regarding receiving message from Karen Herrera

## 2023-12-07 NOTE — RESULT ENCOUNTER NOTE
Please advise patient of labs:    -You have a vitamin D deficiency. Please start taking the vitamin D deficiency prescription that I am sending for 50,000IU 2x/week with FOOD x 8 weeks - take with food for best absorption. We will repeat vitamin D lab in about 4 months. - Your Vitamin B12 levels are mildly low - please take an additional 1,000mcg sublingual B12 daily x 3 months. This can be found inexpensively over the counter.     -Ferritin is dropping - not low yet, but iron stores are getting low.  Please ensure she is taking [place] MVI with 45mg iron daily, thank you

## 2023-12-08 ENCOUNTER — OFFICE VISIT (OUTPATIENT)
Dept: BARIATRICS | Facility: CLINIC | Age: 48
End: 2023-12-08
Payer: COMMERCIAL

## 2023-12-08 VITALS
DIASTOLIC BLOOD PRESSURE: 74 MMHG | BODY MASS INDEX: 34.85 KG/M2 | WEIGHT: 189.4 LBS | HEART RATE: 57 BPM | SYSTOLIC BLOOD PRESSURE: 110 MMHG | HEIGHT: 62 IN

## 2023-12-08 DIAGNOSIS — Z48.815 ENCOUNTER FOR SURGICAL AFTERCARE FOLLOWING SURGERY OF DIGESTIVE SYSTEM: Primary | ICD-10-CM

## 2023-12-08 DIAGNOSIS — K91.2 POSTSURGICAL MALABSORPTION: ICD-10-CM

## 2023-12-08 DIAGNOSIS — R79.89 LOW VITAMIN B12 LEVEL: ICD-10-CM

## 2023-12-08 DIAGNOSIS — E55.9 VITAMIN D INSUFFICIENCY: ICD-10-CM

## 2023-12-08 PROCEDURE — 99214 OFFICE O/P EST MOD 30 MIN: CPT | Performed by: PHYSICIAN ASSISTANT

## 2023-12-08 RX ORDER — ERGOCALCIFEROL 1.25 MG/1
50000 CAPSULE ORAL
Qty: 16 CAPSULE | Refills: 0 | Status: SHIPPED | OUTPATIENT
Start: 2023-12-11

## 2023-12-08 NOTE — PATIENT INSTRUCTIONS
GOALS:   Continued/Maintain healthy weight loss with good nutrition intakes. Adequate hydration with at least 64oz. fluid intake. Normal vitamin and mineral levels. Exercise as tolerated. Add 1,000mcg sublingual B12 daily   Vitamin D Rx    Follow-up in 1 year. We kindly ask that your arrive 15 minutes before your scheduled appointment time with your provider to allow our staff to room you, get your vital signs and update your chart. Follow diet as discussed. Get lab work done in the next 2 weeks. You have been given a lab slip today. Please call the office if you need a replacement. It is recommended to check with your insurance BEFORE getting labs done to make sure they are covered by your policy. Also, please check with your PCP and other providers before getting labs to avoid duplicate labs. Make sure to HOLD any multivitamins that may contain biotin and any biotin supplements FOR 5 DAYS before any labs since it can affect the results. Follow vitamin and mineral recommendations as reviewed with you. Call our office if you have any problems with abdominal pain especially associated with fever, chills, nausea, vomiting or any other concerns. All  Post-bariatric surgery patients should be aware that very small quantities of any alcohol can cause impairment and it is very possible not to feel the effect. The effect can be in the system for several hours. It is also a stomach irritant. It is advised to AVOID alcohol, Nonsteroidal antiinflammatory drugs (NSAIDS) and nicotine of all forms . Any of these can cause stomach irritation/pain.

## 2023-12-08 NOTE — PROGRESS NOTES
Assessment/Plan:    Encounter for surgical aftercare following surgery of digestive system  -s/p Sachin-En-Y Gastric Bypass with Dr. Isacc Parker on 11/29/21. Overall doing well - but rebound of 16lbs in the last year. She has been regularly following with RD and LCSW. Encouraged continued f/u and consider MWM for additional support. Initial: 241.2lbs  Current: 189.4lbs  EWL: 49%  Gama: 168lbs 6 months post op  Current BMI is Body mass index is 34.98 kg/m². Tolerating a regular diet-yes  Eating at least 60 grams of protein per day-yes  Following 30/60 minute rule with liquids-yes  Drinking at least 64 ounces of fluid per day-no; advised her to increase to goal  Drinking carbonated beverages-no  Sufficient exercise-yes  Using NSAIDs regularly-no  Using nicotine-no  Using alcohol-no. Advised about the risks of alcohol s/p bariatric surgery and recommend avoiding all alcohol      Postsurgical malabsorption  -At risk for malabsorption of vitamins/minerals secondary to malabsorption and restriction of intake from bariatric surgery  -Currently taking adequate postop bariatric surgery vitamin supplementation: Bariatric MVI w/ 45mg, calcium citrate 500mg TID  -Labs 11/25/23: Vitamin D low (23) - Rx 50,000IU 2x/week x 8 weeks, B12 mildly low - add 1,000mcg daily - repeat both in 3 months, Ferritin dropped to 50 - repeat in 6 months    -Repeat entire CBC/Metabolic panel in 1 year  -Patient received education about the importance of adhering to a lifelong supplementation regimen to avoid vitamin/mineral deficiencies     Mixed hyperlipidemia  -Avoid fried foods and trans fat, limit saturated fats and refined carbohydrates  -Increase fish/omega 3 FA consumption  -Increase physical activity  -Lipids WNL on recent labs     Diagnoses and all orders for this visit:    Encounter for surgical aftercare following surgery of digestive system    Postsurgical malabsorption  -     Vitamin D 25 hydroxy;  Future  -     ergocalciferol (VITAMIN D2) 50,000 units; Take 1 capsule (50,000 Units total) by mouth 2 (two) times a week with meals  -     Vitamin B12; Future  -     Iron Panel (Includes Ferritin, Iron Sat%, Iron, and TIBC); Future    Vitamin D insufficiency  -     Vitamin D 25 hydroxy; Future  -     ergocalciferol (VITAMIN D2) 50,000 units; Take 1 capsule (50,000 Units total) by mouth 2 (two) times a week with meals    Low vitamin B12 level  -     Vitamin B12; Future          Subjective:      Patient ID: Garrick Berry is a 53 y.o. female. -s/p Sachin-En-Y Gastric Bypass with Dr. Aguila Jung on 11/29/21. Presents to the office today for routine follow up. Tolerating diet without issues; denies N/V, dysphagia, reflux. Overall doing well - maintaining her weight since September, but overall up 17lbs from her armaan. Trying to avoid mindless snacking. Follows regularly with RD and LCSW. Works remotely for IT - patient billing. Has twins in  - in [place] and going to [facility] in January. Mother Pam Asher in [place] doing well. Has 7 siblings. Diet Recall:   B - protein shake  Snack - high protein bar/breakfast bagel   L - grilled chicken and veggies or sometimes flatbread w/ chicken or [place] yogurt w/ scoop granola and cheese  Snack - pretzels   D - protein shake or taco meat or green salad and chicken     Fluids - 50oz water, 1 protein shake, 10oz coffee    The following portions of the patient's history were reviewed and updated as appropriate: allergies, current medications, past family history, past medical history, past social history, past surgical history and problem list.    Review of Systems   Constitutional:  Positive for unexpected weight change. Negative for chills and fever. HENT:  Negative for trouble swallowing. Respiratory:  Negative for cough and shortness of breath. Cardiovascular:  Negative for chest pain and palpitations. Gastrointestinal:  Negative for abdominal pain, constipation, diarrhea, nausea and vomiting.    Neurological: Negative for dizziness. Psychiatric/Behavioral:          High Stress         Objective:      /74   Pulse 57   Ht 5' 1.7" (1.567 m)   Wt 85.9 kg (189 lb 6.4 oz)   BMI 34.98 kg/m²     Colonoscopy-Completed       Physical Exam  Vitals reviewed. Constitutional:       General: She is not in acute distress. Appearance: She is well-developed. HENT:      Head: Normocephalic and atraumatic. Eyes:      General: No scleral icterus. Cardiovascular:      Rate and Rhythm: Normal rate and regular rhythm. Pulmonary:      Effort: Pulmonary effort is normal. No respiratory distress. Abdominal:      General: There is no distension. Palpations: Abdomen is soft. Tenderness: There is no abdominal tenderness. Skin:     General: Skin is warm and dry. Neurological:      Mental Status: She is alert. Psychiatric:         Mood and Affect: Mood normal.         Behavior: Behavior normal.           BARRIERS: none identified    GOALS:   Continued/Maintain healthy weight loss with good nutrition intakes. Adequate hydration with at least 64oz. fluid intake. Normal vitamin and mineral levels. Exercise as tolerated. Add 1,000mcg sublingual B12 daily   Vitamin D Rx    Follow-up in 1 year. We kindly ask that your arrive 15 minutes before your scheduled appointment time with your provider to allow our staff to room you, get your vital signs and update your chart. Follow diet as discussed. Get lab work done in the next 2 weeks. You have been given a lab slip today. Please call the office if you need a replacement. It is recommended to check with your insurance BEFORE getting labs done to make sure they are covered by your policy. Also, please check with your PCP and other providers before getting labs to avoid duplicate labs. Make sure to HOLD any multivitamins that may contain biotin and any biotin supplements FOR 5 DAYS before any labs since it can affect the results.     Follow vitamin and mineral recommendations as reviewed with you. Call our office if you have any problems with abdominal pain especially associated with fever, chills, nausea, vomiting or any other concerns. All  Post-bariatric surgery patients should be aware that very small quantities of any alcohol can cause impairment and it is very possible not to feel the effect. The effect can be in the system for several hours. It is also a stomach irritant. It is advised to AVOID alcohol, Nonsteroidal antiinflammatory drugs (NSAIDS) and nicotine of all forms . Any of these can cause stomach irritation/pain.

## 2023-12-15 ENCOUNTER — OFFICE VISIT (OUTPATIENT)
Dept: FAMILY MEDICINE CLINIC | Facility: CLINIC | Age: 48
End: 2023-12-15
Payer: COMMERCIAL

## 2023-12-15 VITALS
WEIGHT: 193 LBS | TEMPERATURE: 97.1 F | SYSTOLIC BLOOD PRESSURE: 110 MMHG | BODY MASS INDEX: 35.51 KG/M2 | RESPIRATION RATE: 18 BRPM | HEART RATE: 57 BPM | OXYGEN SATURATION: 97 % | DIASTOLIC BLOOD PRESSURE: 72 MMHG | HEIGHT: 62 IN

## 2023-12-15 DIAGNOSIS — F41.9 ANXIETY: ICD-10-CM

## 2023-12-15 DIAGNOSIS — R73.09 ABNORMAL GLUCOSE: ICD-10-CM

## 2023-12-15 DIAGNOSIS — F33.9 RECURRENT DEPRESSION (HCC): ICD-10-CM

## 2023-12-15 DIAGNOSIS — E55.9 VITAMIN D DEFICIENCY: ICD-10-CM

## 2023-12-15 DIAGNOSIS — E78.2 MIXED HYPERLIPIDEMIA: Primary | ICD-10-CM

## 2023-12-15 PROCEDURE — 99214 OFFICE O/P EST MOD 30 MIN: CPT | Performed by: FAMILY MEDICINE

## 2023-12-15 RX ORDER — ESCITALOPRAM OXALATE 20 MG/1
20 TABLET ORAL DAILY
Qty: 90 TABLET | Refills: 1 | Status: SHIPPED | OUTPATIENT
Start: 2023-12-15

## 2023-12-15 NOTE — PROGRESS NOTES
Name: Annie Babin      : 1975      MRN: [id number]  Encounter Provider: Mehrdad Kaplan DO  Encounter Date: 12/15/2023   Encounter department: [facility]     1. Mixed hyperlipidemia  Assessment & Plan:  Stable       2. Recurrent depression ([address])  Assessment & Plan:  Stable   Continue lexapro 20 mg a day       3. Abnormal glucose  Assessment & Plan:  Working on weight loss      4. Vitamin D deficiency  Assessment & Plan:  Levels are low  Just started vitamin D prescription      5. Anxiety  Assessment & Plan:  Stable   Continue lexapro 20 mg a day     Orders:  -     escitalopram (LEXAPRO) 20 mg tablet; Take 1 tablet (20 mg total) by mouth daily    Return in about 6 months (around 6/15/2024) for Annual physical.     Charmayne Lacks is scheduled    Subjective      She is working with weight management . She is feeling well. Her mood is all over the place. Sometimes  she feels in control, others she is overwhelmed.        Review of Systems    Current Outpatient Medications on File Prior to Visit   Medication Sig   • Calcium-Vitamin A-Vitamin D (LIQUID CALCIUM PO) Take 1,500 mg by mouth daily   • ergocalciferol (VITAMIN D2) 50,000 units Take 1 capsule (50,000 Units total) by mouth 2 (two) times a week with meals   • levonorgestrel (MIRENA) 20 MCG/24HR IUD 1 each by Intrauterine route once   • multivitamin (THERAGRAN) TABS Take 1 tablet by mouth daily   • [DISCONTINUED] escitalopram (LEXAPRO) 20 mg tablet Take 1 tablet (20 mg total) by mouth daily   • fluticasone (FLONASE) 50 mcg/act nasal spray 2 sprays into each nostril daily (Patient not taking: Reported on 2023)   • [DISCONTINUED] ergocalciferol (VITAMIN D2) 50,000 units Take 1 capsule (50,000 Units total) by mouth 2 (two) times a week with meals       Objective     /72   Pulse 57   Temp (!) 97.1 °F (36.2 °C)   Resp 18   Ht 5' 1.7" (1.567 m)   Wt 87.5 kg (193 lb)   SpO2 97%   BMI 35.64 kg/m²     Physical Exam  Vitals and nursing note reviewed. Constitutional:       Appearance: She is well-developed. HENT:      Head: Normocephalic and atraumatic. Right Ear: Tympanic membrane and external ear normal.      Left Ear: Tympanic membrane and external ear normal.   Cardiovascular:      Rate and Rhythm: Normal rate and regular rhythm. Heart sounds: Normal heart sounds. No murmur heard. No friction rub. Pulmonary:      Effort: Pulmonary effort is normal. No respiratory distress. Breath sounds: Normal breath sounds. No wheezing or rales. Musculoskeletal:      Right lower leg: No edema. Left lower leg: No edema.        Yany Lockett DO

## 2023-12-22 ENCOUNTER — HOSPITAL ENCOUNTER (OUTPATIENT)
Dept: RADIOLOGY | Facility: HOSPITAL | Age: 48
Discharge: HOME | End: 2023-12-22
Payer: COMMERCIAL

## 2023-12-22 DIAGNOSIS — Z12.31 ENCOUNTER FOR SCREENING MAMMOGRAM FOR BREAST CANCER: ICD-10-CM

## 2023-12-22 PROCEDURE — 77063 BREAST TOMOSYNTHESIS BI: CPT

## 2023-12-22 PROCEDURE — 77067 SCR MAMMO BI INCL CAD: CPT

## 2023-12-27 ENCOUNTER — TELEPHONE (OUTPATIENT)
Dept: RADIOLOGY | Facility: HOSPITAL | Age: 48
End: 2023-12-27

## 2023-12-27 ENCOUNTER — TELEPHONE (OUTPATIENT)
Dept: FAMILY MEDICINE CLINIC | Facility: CLINIC | Age: 48
End: 2023-12-27

## 2023-12-27 NOTE — TELEPHONE ENCOUNTER
12/27/2023 1:29 PM Called Michelle regarding her abnormal mammogram.    FINDINGS:   RIGHT  1) MASS [F]: There is a 6 mm oval mass with circumscribed margins seen in the right breast at 12 o'clock in the anterior depth. This is a new finding.   2) ASYMMETRY [G]: There is an asymmetry seen in the central region of the right breast in the posterior depth on the CC view.     RECOMMENDATION:       - Diagnostic mammogram at the current time for the right breast.       - Ultrasound at the current time for the right breast.       - Routine screening mammogram in 1 year for the left breast.    Left message on her voicemail to call the office.   Zakia Batres,

## 2023-12-27 NOTE — TELEPHONE ENCOUNTER
12/27/2023 1:36 PM spoke with Michelle   We discussed her mammogram results.  She agreed to get the follow-up testing done.    Zakia Batres, DO

## 2024-02-01 ENCOUNTER — HOSPITAL ENCOUNTER (OUTPATIENT)
Dept: RADIOLOGY | Facility: HOSPITAL | Age: 49
Discharge: HOME | End: 2024-02-01
Payer: COMMERCIAL

## 2024-02-01 DIAGNOSIS — R92.8 ABNORMAL MAMMOGRAM OF RIGHT BREAST: ICD-10-CM

## 2024-02-01 PROCEDURE — 76642 ULTRASOUND BREAST LIMITED: CPT

## 2024-02-01 PROCEDURE — 77065 DX MAMMO INCL CAD UNI: CPT

## 2024-02-01 PROCEDURE — G0279 TOMOSYNTHESIS, MAMMO: HCPCS

## 2024-05-20 ENCOUNTER — OFFICE VISIT (OUTPATIENT)
Dept: FAMILY MEDICINE CLINIC | Facility: CLINIC | Age: 49
End: 2024-05-20
Payer: COMMERCIAL

## 2024-05-20 VITALS
WEIGHT: 200 LBS | HEIGHT: 62 IN | SYSTOLIC BLOOD PRESSURE: 114 MMHG | OXYGEN SATURATION: 99 % | HEART RATE: 60 BPM | DIASTOLIC BLOOD PRESSURE: 70 MMHG | TEMPERATURE: 97.9 F | RESPIRATION RATE: 16 BRPM | BODY MASS INDEX: 36.8 KG/M2

## 2024-05-20 DIAGNOSIS — H61.22 IMPACTED CERUMEN OF LEFT EAR: Primary | ICD-10-CM

## 2024-05-20 PROCEDURE — 99214 OFFICE O/P EST MOD 30 MIN: CPT | Performed by: FAMILY MEDICINE

## 2024-05-20 PROCEDURE — 69209 REMOVE IMPACTED EAR WAX UNI: CPT | Performed by: FAMILY MEDICINE

## 2024-05-20 NOTE — PROGRESS NOTES
"Ambulatory Visit  Name: Michelle Mcfadden      : 1975      MRN: [id number]  Encounter Provider: Dodie Simpson MD  Encounter Date: 2024   Encounter department: [facility]    Assessment & Plan   1. Impacted cerumen of left ear  Ear cerumen removal    Date/Time: 2024 3:45 PM    Performed by: Dodie Simpson MD  Authorized by: Dodie Simpson MD  Universal Protocol:  Consent: Verbal consent obtained.    Patient location:  Clinic  Procedure details:     Local anesthetic:  None    Location:  L ear    Procedure type: irrigation only      Approach:  External  Post-procedure details:     Complication:  None    Hearing quality:  Normal    Patient tolerance of procedure:  Tolerated well, no immediate complications         History of Present Illness     HPI  She reports left ear fullness x 1.5 weeks. Feels like there is a cotton ball in there. She has needed ear flushes in the past due to ear wax. No significant pain, drainage. Has not tried any OTC ear wax drops.   Review of Systems   Constitutional: Negative.    HENT:  Positive for hearing loss.    Eyes: Negative.    Respiratory: Negative.     Cardiovascular: Negative.    Gastrointestinal: Negative.    Endocrine: Negative.    Genitourinary: Negative.    Musculoskeletal: Negative.    Skin: Negative.    Allergic/Immunologic: Negative.    Neurological: Negative.    Hematological: Negative.    Psychiatric/Behavioral: Negative.         Objective     /70   Pulse 60   Temp 97.9 °F (36.6 °C)   Resp 16   Ht 5' 2\" (1.575 m)   Wt 90.7 kg (200 lb)   SpO2 99%   BMI 36.58 kg/m²     Physical Exam  Constitutional:       General: She is not in acute distress.     Appearance: Normal appearance. She is not ill-appearing, toxic-appearing or diaphoretic.   HENT:      Head: Normocephalic and atraumatic.      Right Ear: Tympanic membrane, ear canal and external ear normal. There is no impacted cerumen.      Left Ear: Tympanic membrane, ear canal and external " ear normal. There is impacted cerumen.   Eyes:      General:         Right eye: No discharge.         Left eye: No discharge.      Conjunctiva/sclera: Conjunctivae normal.   Pulmonary:      Effort: Pulmonary effort is normal.   Neurological:      Mental Status: She is alert.   Psychiatric:         Mood and Affect: Mood normal.         Behavior: Behavior normal.         Thought Content: Thought content normal.         Judgment: Judgment normal.       Administrative Statements

## 2024-06-01 NOTE — TELEPHONE ENCOUNTER
Patient has been added to the Medication Management wait list with a referral     Insurance: [facility]  Insurance Type:    Commercial []   Medicaid []   [place] (if applicable)   Medicare []  Location Preference: [place]  Provider Preference: none  Virtual: Yes [x] No []
The patient is a 32y Male complaining of flank pain.

## 2024-06-29 ENCOUNTER — APPOINTMENT (OUTPATIENT)
Dept: LAB | Facility: HOSPITAL | Age: 49
End: 2024-06-29
Payer: COMMERCIAL

## 2024-06-29 ENCOUNTER — LAB (OUTPATIENT)
Dept: LAB | Facility: HOSPITAL | Age: 49
End: 2024-06-29
Payer: COMMERCIAL

## 2024-06-29 DIAGNOSIS — R79.89 LOW VITAMIN B12 LEVEL: ICD-10-CM

## 2024-06-29 DIAGNOSIS — Z00.8 HEALTH EXAMINATION IN POPULATION SURVEY: ICD-10-CM

## 2024-06-29 DIAGNOSIS — E55.9 VITAMIN D INSUFFICIENCY: ICD-10-CM

## 2024-06-29 DIAGNOSIS — K91.2 POSTSURGICAL MALABSORPTION: ICD-10-CM

## 2024-06-29 LAB
25(OH)D3 SERPL-MCNC: 30.3 NG/ML (ref 30–100)
CHOLEST SERPL-MCNC: 183 MG/DL
EST. AVERAGE GLUCOSE BLD GHB EST-MCNC: 105 MG/DL
FERRITIN SERPL-MCNC: 54 NG/ML (ref 11–307)
HBA1C MFR BLD: 5.3 %
HDLC SERPL-MCNC: 69 MG/DL
IRON SATN MFR SERPL: 31 % (ref 15–50)
IRON SERPL-MCNC: 104 UG/DL (ref 50–212)
LDLC SERPL CALC-MCNC: 92 MG/DL (ref 0–100)
NONHDLC SERPL-MCNC: 114 MG/DL
TIBC SERPL-MCNC: 335 UG/DL (ref 250–450)
TRIGL SERPL-MCNC: 112 MG/DL
UIBC SERPL-MCNC: 231 UG/DL (ref 155–355)
VIT B12 SERPL-MCNC: 618 PG/ML (ref 180–914)

## 2024-06-29 PROCEDURE — 83540 ASSAY OF IRON: CPT

## 2024-06-29 PROCEDURE — 83036 HEMOGLOBIN GLYCOSYLATED A1C: CPT

## 2024-06-29 PROCEDURE — 82306 VITAMIN D 25 HYDROXY: CPT

## 2024-06-29 PROCEDURE — 83550 IRON BINDING TEST: CPT

## 2024-06-29 PROCEDURE — 82728 ASSAY OF FERRITIN: CPT

## 2024-06-29 PROCEDURE — 80061 LIPID PANEL: CPT

## 2024-06-29 PROCEDURE — 36415 COLL VENOUS BLD VENIPUNCTURE: CPT

## 2024-06-29 PROCEDURE — 82607 VITAMIN B-12: CPT

## 2024-06-30 DIAGNOSIS — Z00.6 ENCOUNTER FOR EXAMINATION FOR NORMAL COMPARISON OR CONTROL IN CLINICAL RESEARCH PROGRAM: ICD-10-CM

## 2024-07-01 NOTE — RESULT ENCOUNTER NOTE
LMOM stated Bernadette has reviewed labs and will send Celena message to view in pts MyChart. Message sent

## 2024-07-01 NOTE — RESULT ENCOUNTER NOTE
Please let Michelle know about her labs, thank you:    -Vitamin D has normalized but is now only low normal -  Please add an additional 2,000 IU of Vitamin D3 per day in addition to the 3,000IU of Vitamin D you are currently taking in your Bariatric MVI.  Vitamin D is best absorbed with food, so take it with your largest meal of the day. It can be found inexpensively over the counter at your pharmacy or online.    Remember to also take 1500 mg calcium citrate per day total (taken 500 mg at a time, three times per day). It is very important that you separate each dose by at least 2 hours and take calcium at least 2 hours apart from MVI and iron.    -Ferritin stores and B12 levels improving - continue with extra B12 and iron regimen as she has been doing to keep her levels up here!

## 2024-07-03 ENCOUNTER — APPOINTMENT (OUTPATIENT)
Dept: LAB | Facility: HOSPITAL | Age: 49
End: 2024-07-03

## 2024-07-03 DIAGNOSIS — Z00.6 ENCOUNTER FOR EXAMINATION FOR NORMAL COMPARISON OR CONTROL IN CLINICAL RESEARCH PROGRAM: ICD-10-CM

## 2024-07-03 PROCEDURE — 36415 COLL VENOUS BLD VENIPUNCTURE: CPT

## 2024-08-05 ENCOUNTER — OFFICE VISIT (OUTPATIENT)
Dept: FAMILY MEDICINE CLINIC | Facility: CLINIC | Age: 49
End: 2024-08-05
Payer: COMMERCIAL

## 2024-08-05 VITALS
HEART RATE: 89 BPM | WEIGHT: 200 LBS | RESPIRATION RATE: 16 BRPM | DIASTOLIC BLOOD PRESSURE: 76 MMHG | HEIGHT: 62 IN | BODY MASS INDEX: 36.8 KG/M2 | SYSTOLIC BLOOD PRESSURE: 118 MMHG | TEMPERATURE: 97.7 F

## 2024-08-05 DIAGNOSIS — F33.9 RECURRENT DEPRESSION (HCC): ICD-10-CM

## 2024-08-05 DIAGNOSIS — Z00.00 ANNUAL PHYSICAL EXAM: Primary | ICD-10-CM

## 2024-08-05 PROCEDURE — 99396 PREV VISIT EST AGE 40-64: CPT | Performed by: FAMILY MEDICINE

## 2024-08-05 NOTE — PATIENT INSTRUCTIONS
"Patient Education     Routine physical for adults   The Basics   Written by the doctors and editors at [facility]   What is a physical? -- A physical is a routine visit, or \"check-up,\" with your doctor. You might also hear it called a \"wellness visit\" or \"preventive visit.\"  During each visit, the doctor will:   Ask about your physical and mental health   Ask about your habits, behaviors, and lifestyle   Do an exam   Give you vaccines if needed   Talk to you about any medicines you take   Give advice about your health   Answer your questions  Getting regular check-ups is an important part of taking care of your health. It can help your doctor find and treat any problems you have. But it's also important for preventing health problems.  A routine physical is different from a \"sick visit.\" A sick visit is when you see a doctor because of a health concern or problem. Since physicals are scheduled ahead of time, you can think about what you want to ask the doctor.  How often should I get a physical? -- It depends on your age and health. In general, for people age 21 years and older:   If you are younger than 50 years, you might be able to get a physical every 3 years.   If you are 50 years or older, your doctor might recommend a physical every year.  If you have an ongoing health condition, like diabetes or high blood pressure, your doctor will probably want to see you more often.  What happens during a physical? -- In general, each visit will include:   Physical exam - The doctor or nurse will check your height, weight, heart rate, and blood pressure. They will also look at your eyes and ears. They will ask about how you are feeling and whether you have any symptoms that bother you.   Medicines - It's a good idea to bring a list of all the medicines you take to each doctor visit. Your doctor will talk to you about your medicines and answer any questions. Tell them if you are having any side effects that bother you. You " "should also tell them if you are having trouble paying for any of your medicines.   Habits and behaviors - This includes:   Your diet   Your exercise habits   Whether you smoke, drink alcohol, or use drugs   Whether you are sexually active   Whether you feel safe at home  Your doctor will talk to you about things you can do to improve your health and lower your risk of health problems. They will also offer help and support. For example, if you want to quit smoking, they can give you advice and might prescribe medicines. If you want to improve your diet or get more physical activity, they can help you with this, too.   Lab tests, if needed - The tests you get will depend on your age and situation. For example, your doctor might want to check your:   Cholesterol   Blood sugar   Iron level   Vaccines - The recommended vaccines will depend on your age, health, and what vaccines you already had. Vaccines are very important because they can prevent certain serious or deadly infections.   Discussion of screening - \"Screening\" means checking for diseases or other health problems before they cause symptoms. Your doctor can recommend screening based on your age, risk, and preferences. This might include tests to check for:   Cancer, such as breast, prostate, cervical, ovarian, colorectal, prostate, lung, or skin cancer   Sexually transmitted infections, such as chlamydia and gonorrhea   Mental health conditions like depression and anxiety  Your doctor will talk to you about the different types of screening tests. They can help you decide which screenings to have. They can also explain what the results might mean.   Answering questions - The physical is a good time to ask the doctor or nurse questions about your health. If needed, they can refer you to other doctors or specialists, too.  Adults older than 65 years often need other care, too. As you get older, your doctor will talk to you about:   How to prevent falling at " home   Hearing or vision tests   Memory testing   How to take your medicines safely   Making sure that you have the help and support you need at home  All topics are updated as new evidence becomes available and our peer review process is complete.  This topic retrieved from [employer] on: May 02, 2024.  Topic 840250 Version 1.0  Release: 32.4.3 - C32.122  © 2024 UpToDate, Inc. and/or its affiliates. All rights reserved.  Consumer Information Use and Disclaimer   Disclaimer: This generalized information is a limited summary of diagnosis, treatment, and/or medication information. It is not meant to be comprehensive and should be used as a tool to help the user understand and/or assess potential diagnostic and treatment options. It does NOT include all information about conditions, treatments, medications, side effects, or risks that may apply to a specific patient. It is not intended to be medical advice or a substitute for the medical advice, diagnosis, or treatment of a health care provider based on the health care provider's examination and assessment of a patient's specific and unique circumstances. Patients must speak with a health care provider for complete information about their health, medical questions, and treatment options, including any risks or benefits regarding use of medications. This information does not endorse any treatments or medications as safe, effective, or approved for treating a specific patient. UpToDate, Inc. and its affiliates disclaim any warranty or liability relating to this information or the use thereof.The use of this information is governed by the Terms of Use, available at https://www.woltersZynstrauwer.com/en/know/clinical-effectiveness-terms. 2024© UpToDate, Inc. and its affiliates and/or licensors. All rights reserved.  Copyright   © 2024 UpToDate, Inc. and/or its affiliates. All rights reserved.

## 2024-08-05 NOTE — PROGRESS NOTES
"Adult Annual Physical  Name: Michelle Mcfadden      : 1975      MRN: [id number]  Encounter Provider: Zakia Batres DO  Encounter Date: 2024   Encounter department: [facility]    Assessment & Plan   1. Annual physical exam  2. Recurrent depression (HCC)  Assessment & Plan:  Stable  Continue lexapro 20 mg a day   Immunizations and preventive care screenings were discussed with patient today. Appropriate education was printed on patient's after visit summary.    Counseling:  Dental Health: discussed importance of regular tooth brushing, flossing, and dental visits.  Exercise: the importance of regular exercise/physical activity was discussed. Recommend exercise 3-5 times per week for at least 30 minutes.      Return in about 6 months (around 2025) for Next scheduled follow up.    History of Present Illness     Adult Annual Physical:  Patient presents for annual physical. She has good and bad days for her depression.     She always had cognitive issues. She didn't have hyperactive issues.   She didn't have issues with attention as a kid.     Diet and Physical Activity:  - Diet/Nutrition: well balanced diet.  - Exercise: walking.    Depression Screening:    - PHQ-9 Score: 7    General Health:  - Sleep: sleeps poorly.  - Hearing: decreased hearing right ear. stable  - Vision: wears glasses.  - Dental: no dental visits for > 1 year.    /GYN Health:  - Follows with GYN: yes.   - Menopause: premenopausal.     Review of Systems   Constitutional: Negative.    Respiratory: Negative.     Cardiovascular: Negative.          Objective     /76   Pulse 89   Temp 97.7 °F (36.5 °C)   Resp 16   Ht 5' 2\" (1.575 m)   Wt 90.7 kg (200 lb)   BMI 36.58 kg/m²     Physical Exam  Vitals and nursing note reviewed.   Constitutional:       Appearance: She is well-developed.   HENT:      Head: Normocephalic and atraumatic.      Right Ear: External ear normal.      Left Ear: External ear normal.      Nose: " Nose normal.   Cardiovascular:      Rate and Rhythm: Normal rate and regular rhythm.      Heart sounds: Normal heart sounds. No murmur heard.     No friction rub.   Pulmonary:      Effort: No respiratory distress.      Breath sounds: Normal breath sounds. No wheezing or rales.   Abdominal:      Palpations: Abdomen is soft.      Tenderness: There is no abdominal tenderness.   Musculoskeletal:      Right lower leg: No edema.      Left lower leg: No edema.   Neurological:      Mental Status: She is oriented to person, place, and time.      Cranial Nerves: No cranial nerve deficit.         Eye exam - done by eye doctor

## 2024-09-03 ENCOUNTER — OFFICE VISIT (OUTPATIENT)
Dept: FAMILY MEDICINE CLINIC | Facility: CLINIC | Age: 49
End: 2024-09-03
Payer: COMMERCIAL

## 2024-09-03 VITALS
WEIGHT: 202.8 LBS | RESPIRATION RATE: 18 BRPM | BODY MASS INDEX: 37.32 KG/M2 | DIASTOLIC BLOOD PRESSURE: 70 MMHG | TEMPERATURE: 98.4 F | SYSTOLIC BLOOD PRESSURE: 124 MMHG | HEIGHT: 62 IN | HEART RATE: 62 BPM

## 2024-09-03 DIAGNOSIS — E66.9 OBESITY (BMI 30-39.9): ICD-10-CM

## 2024-09-03 DIAGNOSIS — M54.50 ACUTE LEFT-SIDED LOW BACK PAIN WITHOUT SCIATICA: Primary | ICD-10-CM

## 2024-09-03 DIAGNOSIS — F41.9 ANXIETY: ICD-10-CM

## 2024-09-03 PROCEDURE — 99214 OFFICE O/P EST MOD 30 MIN: CPT | Performed by: FAMILY MEDICINE

## 2024-09-03 RX ORDER — BACLOFEN 10 MG/1
10 TABLET ORAL
Qty: 30 TABLET | Refills: 1 | Status: SHIPPED | OUTPATIENT
Start: 2024-09-03

## 2024-09-03 RX ORDER — METHYLPREDNISOLONE 4 MG
TABLET, DOSE PACK ORAL
Qty: 21 TABLET | Refills: 0 | Status: SHIPPED | OUTPATIENT
Start: 2024-09-03 | End: 2024-09-09

## 2024-09-03 NOTE — PROGRESS NOTES
Assessment/Plan:    No problem-specific Assessment & Plan notes found for this encounter.    Acute strain  Steroid risks advised  Baclofen safety with lexapro advised, can use prn in future  Ice  Stretch  Rom  PT referral if no better    Anxiety stable on lexapro     Diagnoses and all orders for this visit:    Acute left-sided low back pain without sciatica  -     baclofen 10 mg tablet; Take 1 tablet (10 mg total) by mouth daily at bedtime as needed for muscle spasms  -     methylPREDNISolone 4 MG tablet therapy pack; Use as directed on package    Obesity (BMI 30-39.9)    BMI 37.0-37.9, adult    Anxiety              Return if symptoms worsen or fail to improve.    Subjective:      Patient ID: Michelle Mcfadden is a 48 y.o. female.    Chief Complaint   Patient presents with    Back Pain     Lower L back, started yesterday morning   YC        HPI  No triggers for sure  Cleaning shed, cleaning house and big things  More sore afterwards, last pm severe  All rom and being still  Left low back  Bariatric pt  GBS/RNY 3y ago  No nsaids  Just tylenol  On lexapro, helps   No ice  Used heat    Last A1c 5.3  Not diabetic    The following portions of the patient's history were reviewed and updated as appropriate: allergies, current medications, past family history, past medical history, past social history, past surgical history and problem list.    Review of Systems   Constitutional:  Negative for fever.   Genitourinary:  Negative for difficulty urinating.   Musculoskeletal:  Positive for back pain.         Current Outpatient Medications   Medication Sig Dispense Refill    baclofen 10 mg tablet Take 1 tablet (10 mg total) by mouth daily at bedtime as needed for muscle spasms 30 tablet 1    Calcium-Vitamin A-Vitamin D (LIQUID CALCIUM PO) Take 1,500 mg by mouth daily      ergocalciferol (VITAMIN D2) 50,000 units Take 1 capsule (50,000 Units total) by mouth 2 (two) times a week with meals 16 capsule 0    escitalopram (LEXAPRO) 20 mg  "tablet Take 1 tablet (20 mg total) by mouth daily 90 tablet 1    fluticasone (FLONASE) 50 mcg/act nasal spray 2 sprays into each nostril daily (Patient taking differently: 2 sprays into each nostril daily As needed) 16 g 3    levonorgestrel (MIRENA) 20 MCG/24HR IUD 1 each by Intrauterine route once      methylPREDNISolone 4 MG tablet therapy pack Use as directed on package 21 tablet 0    multivitamin (THERAGRAN) TABS Take 1 tablet by mouth daily       No current facility-administered medications for this visit.       Objective:    /70   Pulse 62   Temp 98.4 °F (36.9 °C) (Tympanic)   Resp 18   Ht 5' 2\" (1.575 m)   Wt 92 kg (202 lb 12.8 oz)   BMI 37.09 kg/m²        Physical Exam  Vitals and nursing note reviewed.   Constitutional:       General: She is not in acute distress.     Appearance: She is well-developed. She is obese. She is not ill-appearing.   HENT:      Head: Normocephalic.      Right Ear: Tympanic membrane normal.      Left Ear: Tympanic membrane normal.      Mouth/Throat:      Mouth: Mucous membranes are moist.   Eyes:      General: No scleral icterus.     Conjunctiva/sclera: Conjunctivae normal.   Cardiovascular:      Rate and Rhythm: Normal rate and regular rhythm.   Pulmonary:      Effort: Pulmonary effort is normal. No respiratory distress.      Breath sounds: No wheezing.   Abdominal:      Palpations: Abdomen is soft.   Musculoskeletal:         General: Tenderness present. No deformity.      Cervical back: Neck supple.      Right lower leg: No edema.      Left lower leg: No edema.      Comments: Left paralumbar tenderness worse with right SB and rotation   Skin:     General: Skin is warm and dry.      Coloration: Skin is not pale.   Neurological:      Mental Status: She is alert.      Deep Tendon Reflexes: Reflexes normal.   Psychiatric:         Mood and Affect: Mood normal.         Behavior: Behavior normal.         Thought Content: Thought content normal.                Giuseppe " Hermes, DO

## 2024-10-15 LAB
APOB+LDLR+PCSK9 GENE MUT ANL BLD/T: NOT DETECTED
BRCA1+BRCA2 DEL+DUP + FULL MUT ANL BLD/T: NOT DETECTED
MLH1+MSH2+MSH6+PMS2 GN DEL+DUP+FUL M: NOT DETECTED

## 2024-10-25 ENCOUNTER — TELEPHONE (OUTPATIENT)
Dept: PSYCHIATRY | Facility: CLINIC | Age: 49
End: 2024-10-25

## 2024-10-25 NOTE — TELEPHONE ENCOUNTER
Received call from patient in regards to [place] paperwork for patient due to daughter's condition.  Patient states that the [facility] paperwork is due soon and wanted to know if Dr Charo Panda has completed it.  Patient aware that LYLY needs to be completed also.  Please advise.

## 2024-12-03 NOTE — ASSESSMENT & PLAN NOTE
-Avoid fried foods and trans fat, limit saturated fats and refined carbohydrates  -Increase fish/omega 3 FA consumption  -Increase physical activity  -Lipids WNL in [place]

## 2024-12-03 NOTE — ASSESSMENT & PLAN NOTE
-At risk for malabsorption of vitamins/minerals secondary to malabsorption and restriction of intake from bariatric surgery  -Currently taking adequate postop bariatric surgery vitamin supplementation: Bariatric MVI w/ 45mg, calcium citrate 500mg TID    -Repeat entire CBC/Metabolic panel   -Patient received education about the importance of adhering to a lifelong supplementation regimen to avoid vitamin/mineral deficiencies

## 2024-12-03 NOTE — ASSESSMENT & PLAN NOTE
-s/p Sachin-En-Y Gastric Bypass with Dr. Hdez on 11/29/21. Overall struggling with weight regain - up 12lbs in the last year and overall up 33lbs from her armaan. She will work on diet and lifestyle changes and f/u with monthly weight checks and me in 3 months.    Will trial Zepbound. Patient denies personal history of pancreatitis. Patient also denies personal and family history of medullary thyroid cancer and multiple endocrine neoplasia type 2 (MEN 2 tumor).     Initial: 241.2lbs  Current: 201.2lbs  EWL: 38%  Armaan: 168lbs 6 months post op  Current BMI is Body mass index is 37.1 kg/m².    Tolerating a regular diet-yes  Eating at least 60 grams of protein per day-yes  Following 30/60 minute rule with liquids-yes  Drinking at least 64 ounces of fluid per day-no; advised her to increase to goal  Drinking carbonated beverages-no  Sufficient exercise-yes  Using NSAIDs regularly-no  Using nicotine-no  Using alcohol-no. Advised about the risks of alcohol s/p bariatric surgery and recommend avoiding all alcohol  Zepbound Instructions:    - Begin Zepbound 2.5 mg subcutaneously once a week. Dose changes may occur after 4 doses if medication is tolerated. You will be assessed prior to each dose change to make sure you are tolerating the medication well.  - Please message me when you have 2 pens left from the prescription so there are no lapses in treatment.  - If you have been off the medication for more than 14 days please contact the office as you will need to restart the titration at the starting dose again to avoid significant side effects or adverse events.  - Visit [email or web address] for further information/injection instructions.   -Please eat small frequent meals to help reduce nausea. Lemon water and saltine crackers may help with this.   - Side effects of Zepbound discussed: nausea, vomiting, diarrhea, and constipation. If you experience fever, nausea/vomiting, and pain radiating to your back this may be a sign of  pancreatitis. Please go to the emergency room if this occurs.  - If on oral birth control a 2nd method of birth control is recommended during the 1st 8 weeks of therapy and for 4 weeks after any dosage change.   - Patient understands the side effects of the medication and proper administration. Patient agrees with the treatment plan and all questions were answered.

## 2024-12-13 ENCOUNTER — OFFICE VISIT (OUTPATIENT)
Dept: BARIATRICS | Facility: CLINIC | Age: 49
End: 2024-12-13
Payer: COMMERCIAL

## 2024-12-13 VITALS
OXYGEN SATURATION: 99 % | WEIGHT: 201.2 LBS | SYSTOLIC BLOOD PRESSURE: 122 MMHG | BODY MASS INDEX: 37.02 KG/M2 | HEIGHT: 62 IN | DIASTOLIC BLOOD PRESSURE: 68 MMHG | HEART RATE: 64 BPM

## 2024-12-13 DIAGNOSIS — E66.812 OBESITY, CLASS II, BMI 35-39.9: ICD-10-CM

## 2024-12-13 DIAGNOSIS — R63.2 EXCESSIVE HUNGER: ICD-10-CM

## 2024-12-13 DIAGNOSIS — E78.2 MIXED HYPERLIPIDEMIA: ICD-10-CM

## 2024-12-13 DIAGNOSIS — Z48.815 ENCOUNTER FOR SURGICAL AFTERCARE FOLLOWING SURGERY OF DIGESTIVE SYSTEM: Primary | ICD-10-CM

## 2024-12-13 DIAGNOSIS — K91.2 POSTSURGICAL MALABSORPTION: ICD-10-CM

## 2024-12-13 DIAGNOSIS — E28.2 POLYCYSTIC OVARIAN SYNDROME: ICD-10-CM

## 2024-12-13 PROCEDURE — 99214 OFFICE O/P EST MOD 30 MIN: CPT | Performed by: PHYSICIAN ASSISTANT

## 2024-12-13 RX ORDER — TIRZEPATIDE 2.5 MG/.5ML
2.5 INJECTION, SOLUTION SUBCUTANEOUS WEEKLY
Qty: 2 ML | Refills: 0 | Status: SHIPPED | OUTPATIENT
Start: 2024-12-13 | End: 2025-01-10

## 2024-12-13 NOTE — PROGRESS NOTES
Assessment/Plan:    Encounter for surgical aftercare following surgery of digestive system  -s/p Sachin-En-Y Gastric Bypass with Dr. Hdez on 11/29/21. Overall struggling with weight regain - up 12lbs in the last year and overall up 33lbs from her armaan. She will work on diet and lifestyle changes and f/u with monthly weight checks and me in 3 months.    Will trial Zepbound. Patient denies personal history of pancreatitis. Patient also denies personal and family history of medullary thyroid cancer and multiple endocrine neoplasia type 2 (MEN 2 tumor).     Initial: 241.2lbs  Current: 201.2lbs  EWL: 38%  Armaan: 168lbs 6 months post op  Current BMI is Body mass index is 37.1 kg/m².    Tolerating a regular diet-yes  Eating at least 60 grams of protein per day-yes  Following 30/60 minute rule with liquids-yes  Drinking at least 64 ounces of fluid per day-no; advised her to increase to goal  Drinking carbonated beverages-no  Sufficient exercise-yes  Using NSAIDs regularly-no  Using nicotine-no  Using alcohol-no. Advised about the risks of alcohol s/p bariatric surgery and recommend avoiding all alcohol  Zepbound Instructions:    - Begin Zepbound 2.5 mg subcutaneously once a week. Dose changes may occur after 4 doses if medication is tolerated. You will be assessed prior to each dose change to make sure you are tolerating the medication well.  - Please message me when you have 2 pens left from the prescription so there are no lapses in treatment.  - If you have been off the medication for more than 14 days please contact the office as you will need to restart the titration at the starting dose again to avoid significant side effects or adverse events.  - Visit [email or web address] for further information/injection instructions.   -Please eat small frequent meals to help reduce nausea. Lemon water and saltine crackers may help with this.   - Side effects of Zepbound discussed: nausea, vomiting, diarrhea, and constipation. If you  experience fever, nausea/vomiting, and pain radiating to your back this may be a sign of pancreatitis. Please go to the emergency room if this occurs.  - If on oral birth control a 2nd method of birth control is recommended during the 1st 8 weeks of therapy and for 4 weeks after any dosage change.   - Patient understands the side effects of the medication and proper administration. Patient agrees with the treatment plan and all questions were answered.       Postsurgical malabsorption  -At risk for malabsorption of vitamins/minerals secondary to malabsorption and restriction of intake from bariatric surgery  -Currently taking adequate postop bariatric surgery vitamin supplementation: Bariatric MVI w/ 45mg, calcium citrate 500mg TID    -Repeat entire CBC/Metabolic panel   -Patient received education about the importance of adhering to a lifelong supplementation regimen to avoid vitamin/mineral deficiencies     Mixed hyperlipidemia  -Avoid fried foods and trans fat, limit saturated fats and refined carbohydrates  -Increase fish/omega 3 FA consumption  -Increase physical activity  -Lipids WNL in June    Polycystic ovarian syndrome  -Would likely benefit from metformin vs GLP-1  -Consult with MWM     Diagnoses and all orders for this visit:    Encounter for surgical aftercare following surgery of digestive system    Postsurgical malabsorption  -     CBC and differential; Future  -     Comprehensive metabolic panel; Future  -     Folate; Future  -     Hemoglobin A1C; Future  -     Iron Panel (Includes Ferritin, Iron Sat%, Iron, and TIBC); Future  -     Vitamin A; Future  -     TSH, 3rd generation with Free T4 reflex; Future  -     PTH, intact; Future  -     Lipid panel; Future  -     Vitamin B1, whole blood; Future  -     Vitamin B12; Future  -     Vitamin D 25 hydroxy; Future  -     Zinc; Future    Mixed hyperlipidemia  -     Lipid panel; Future    Polycystic ovarian syndrome  -     tirzepatide (Zepbound) 2.5 mg/0.5 mL  auto-injector; Inject 0.5 mL (2.5 mg total) under the skin once a week for 28 days    Excessive hunger  -     tirzepatide (Zepbound) 2.5 mg/0.5 mL auto-injector; Inject 0.5 mL (2.5 mg total) under the skin once a week for 28 days    Obesity, Class II, BMI 35-39.9  -     tirzepatide (Zepbound) 2.5 mg/0.5 mL auto-injector; Inject 0.5 mL (2.5 mg total) under the skin once a week for 28 days  -     Hemoglobin A1C; Future  -     TSH, 3rd generation with Free T4 reflex; Future  -     Lipid panel; Future          Subjective:      Patient ID: Michelle Mcfadden is a 49 y.o. female.    -s/p Sachin-En-Y Gastric Bypass with Dr. Hdez on 11/29/21. Presents to the office today for routine follow up. Tolerating diet without issues; denies N/V, dysphagia, reflux. Overall struggling with weight regain - up 12lbs in the last year and overall up 33lbs from her armaan. Feeling more cravings/hunger.    Unfortunately twins are in IOP programs - ADHD and Autism diagnosis and depression/anxiety. Travelling to long appointments and off her eating routine.    Works remotely for IT - patient billing. Has twins (Luci, Lizbet) in  - in [place] -went to [place] last January. Mother Charo in [place] doing well. Has 7 siblings.     The following portions of the patient's history were reviewed and updated as appropriate: allergies, current medications, past family history, past medical history, past social history, past surgical history and problem list.    Review of Systems   Constitutional:  Positive for unexpected weight change (weight regain). Negative for chills and fever.   HENT:  Negative for trouble swallowing.    Respiratory:  Negative for cough and shortness of breath.    Cardiovascular:  Negative for chest pain and palpitations.   Gastrointestinal:  Negative for abdominal pain, constipation, diarrhea, nausea and vomiting.   Neurological:  Negative for dizziness.   Psychiatric/Behavioral:          High stress         Objective:      BP  "122/68 (BP Location: Right arm, Patient Position: Sitting, Cuff Size: Large)   Pulse 64   Ht 5' 1.75\" (1.568 m)   Wt 91.3 kg (201 lb 3.2 oz)   LMP  (LMP Unknown)   SpO2 99%   BMI 37.10 kg/m²     Colonoscopy-Completed       Physical Exam  Vitals reviewed.   Constitutional:       General: She is not in acute distress.     Appearance: She is well-developed.   HENT:      Head: Normocephalic and atraumatic.   Eyes:      General: No scleral icterus.  Cardiovascular:      Rate and Rhythm: Normal rate and regular rhythm.      Heart sounds: Normal heart sounds.   Pulmonary:      Effort: Pulmonary effort is normal. No respiratory distress.   Abdominal:      General: There is no distension.      Palpations: Abdomen is soft.   Skin:     General: Skin is warm and dry.   Neurological:      Mental Status: She is alert.   Psychiatric:         Mood and Affect: Mood normal.         Behavior: Behavior normal.           BARRIERS: none identified    GOALS:   Continued/Maintain healthy weight loss with good nutrition intakes.  Adequate hydration with at least 64oz. fluid intake.  Normal vitamin and mineral levels.  Exercise as tolerated.    Follow-up in 3 months. We kindly ask that your arrive 15 minutes before your scheduled appointment time with your provider to allow our staff to room you, get your vital signs and update your chart.    Follow diet as discussed.      Get lab work done in the next 2 weeks.  You have been given a lab slip today.  Please call the office if you need a replacement.  It is recommended to check with your insurance BEFORE getting labs done to make sure they are covered by your policy.  Also, please check with your PCP and other providers before getting labs to avoid duplicate labs. Make sure to HOLD any multivitamins that may contain biotin and any biotin supplements FOR 5 DAYS before any labs since it can affect the results.    Follow vitamin and mineral recommendations as reviewed with you.    Call our " office if you have any problems with abdominal pain especially associated with fever, chills, nausea, vomiting or any other concerns.    All  Post-bariatric surgery patients should be aware that very small quantities of any alcohol can cause impairment and it is very possible not to feel the effect. The effect can be in the system for several hours.  It is also a stomach irritant.     It is advised to AVOID alcohol, Nonsteroidal antiinflammatory drugs (NSAIDS) and nicotine of all forms . Any of these can cause stomach irritation/pain.

## 2024-12-19 ENCOUNTER — TELEPHONE (OUTPATIENT)
Dept: BARIATRICS | Facility: CLINIC | Age: 49
End: 2024-12-19

## 2025-01-17 DIAGNOSIS — R63.2 EXCESSIVE HUNGER: ICD-10-CM

## 2025-01-17 DIAGNOSIS — E66.812 OBESITY, CLASS II, BMI 35-39.9: Primary | ICD-10-CM

## 2025-01-17 RX ORDER — TIRZEPATIDE 5 MG/.5ML
5 INJECTION, SOLUTION SUBCUTANEOUS WEEKLY
Qty: 2 ML | Refills: 0 | Status: SHIPPED | OUTPATIENT
Start: 2025-01-17 | End: 2025-02-14

## 2025-02-14 ENCOUNTER — OFFICE VISIT (OUTPATIENT)
Dept: FAMILY MEDICINE CLINIC | Facility: CLINIC | Age: 50
End: 2025-02-14
Payer: COMMERCIAL

## 2025-02-14 VITALS
WEIGHT: 193 LBS | TEMPERATURE: 98.9 F | RESPIRATION RATE: 16 BRPM | DIASTOLIC BLOOD PRESSURE: 74 MMHG | BODY MASS INDEX: 35.51 KG/M2 | SYSTOLIC BLOOD PRESSURE: 124 MMHG | HEART RATE: 64 BPM | HEIGHT: 62 IN

## 2025-02-14 DIAGNOSIS — E66.9 OBESITY (BMI 30-39.9): Primary | ICD-10-CM

## 2025-02-14 DIAGNOSIS — F33.9 RECURRENT DEPRESSION (HCC): ICD-10-CM

## 2025-02-14 DIAGNOSIS — E78.2 MIXED HYPERLIPIDEMIA: ICD-10-CM

## 2025-02-14 DIAGNOSIS — Z12.31 ENCOUNTER FOR SCREENING MAMMOGRAM FOR BREAST CANCER: ICD-10-CM

## 2025-02-14 PROCEDURE — 99213 OFFICE O/P EST LOW 20 MIN: CPT | Performed by: FAMILY MEDICINE

## 2025-02-14 NOTE — PROGRESS NOTES
"Name: Michelle Mcfadden      : 1975      MRN: [id number]  Encounter Provider: Zakia Batres DO  Encounter Date: 2025   Encounter department: [facility]  :  Assessment & Plan  Obesity (BMI 30-39.9)  On Zepbound  Following with weight management        Encounter for screening mammogram for breast cancer    Orders:  •  Mammo screening bilateral w 3d and cad; Future    Recurrent depression (HCC)  Coping as best she can in the situation   Continue lexapro 20 mg a day   Exercise discussed at length - cardio and strength training discussed        Mixed hyperlipidemia  Stable   Has labs pended from weight management        Return in about 6 months (around 2025) for Next scheduled follow up.      Depression Screening and Follow-up Plan: Patient's depression screening was positive with a PHQ-9 score of 9.   Patient with underlying depression and was advised to continue current medications as prescribed.       History of Present Illness   Overall, she has had a lot going on. She has had a lot of family mental health drama. She is able to cope with it with hermedication.       Review of Systems    Objective   /74   Pulse 64   Temp 98.9 °F (37.2 °C)   Resp 16   Ht 5' 1.75\" (1.568 m)   Wt 87.5 kg (193 lb)   BMI 35.59 kg/m²      Physical Exam  Vitals and nursing note reviewed.   Constitutional:       General: She is not in acute distress.     Appearance: She is well-developed.   HENT:      Head: Normocephalic and atraumatic.      Right Ear: Tympanic membrane normal.      Left Ear: Tympanic membrane normal.   Cardiovascular:      Rate and Rhythm: Normal rate and regular rhythm.      Heart sounds: No murmur heard.  Pulmonary:      Effort: Pulmonary effort is normal. No respiratory distress.      Breath sounds: Normal breath sounds.   Musculoskeletal:      Cervical back: Neck supple.   Neurological:      Mental Status: She is alert.         "

## 2025-02-14 NOTE — ASSESSMENT & PLAN NOTE
Coping as best she can in the situation   Continue lexapro 20 mg a day   Exercise discussed at length - cardio and strength training discussed

## 2025-02-19 DIAGNOSIS — R63.2 EXCESSIVE HUNGER: ICD-10-CM

## 2025-02-19 DIAGNOSIS — E66.812 OBESITY, CLASS II, BMI 35-39.9: Primary | ICD-10-CM

## 2025-02-19 RX ORDER — TIRZEPATIDE 5 MG/.5ML
5 INJECTION, SOLUTION SUBCUTANEOUS WEEKLY
Qty: 2 ML | Refills: 0 | Status: SHIPPED | OUTPATIENT
Start: 2025-02-19 | End: 2025-03-19

## 2025-03-01 ENCOUNTER — APPOINTMENT (OUTPATIENT)
Dept: LAB | Facility: HOSPITAL | Age: 50
End: 2025-03-01
Payer: COMMERCIAL

## 2025-03-01 DIAGNOSIS — E78.2 MIXED HYPERLIPIDEMIA: ICD-10-CM

## 2025-03-01 DIAGNOSIS — E66.812 OBESITY, CLASS II, BMI 35-39.9: ICD-10-CM

## 2025-03-01 DIAGNOSIS — K91.2 POSTSURGICAL MALABSORPTION: ICD-10-CM

## 2025-03-01 LAB
25(OH)D3 SERPL-MCNC: 32.9 NG/ML (ref 30–100)
ALBUMIN SERPL BCG-MCNC: 4.5 G/DL (ref 3.5–5)
ALP SERPL-CCNC: 75 U/L (ref 34–104)
ALT SERPL W P-5'-P-CCNC: 15 U/L (ref 7–52)
ANION GAP SERPL CALCULATED.3IONS-SCNC: 11 MMOL/L (ref 4–13)
AST SERPL W P-5'-P-CCNC: 14 U/L (ref 13–39)
BASOPHILS # BLD AUTO: 0.03 THOUSANDS/ÂΜL (ref 0–0.1)
BASOPHILS NFR BLD AUTO: 0 % (ref 0–1)
BILIRUB SERPL-MCNC: 0.78 MG/DL (ref 0.2–1)
BUN SERPL-MCNC: 11 MG/DL (ref 5–25)
CALCIUM SERPL-MCNC: 9.5 MG/DL (ref 8.4–10.2)
CHLORIDE SERPL-SCNC: 105 MMOL/L (ref 96–108)
CHOLEST SERPL-MCNC: 164 MG/DL (ref ?–200)
CO2 SERPL-SCNC: 24 MMOL/L (ref 21–32)
CREAT SERPL-MCNC: 0.74 MG/DL (ref 0.6–1.3)
EOSINOPHIL # BLD AUTO: 0.07 THOUSAND/ÂΜL (ref 0–0.61)
EOSINOPHIL NFR BLD AUTO: 1 % (ref 0–6)
ERYTHROCYTE [DISTWIDTH] IN BLOOD BY AUTOMATED COUNT: 13 % (ref 11.6–15.1)
EST. AVERAGE GLUCOSE BLD GHB EST-MCNC: 105 MG/DL
FERRITIN SERPL-MCNC: 54 NG/ML (ref 11–307)
FOLATE SERPL-MCNC: >22.3 NG/ML
GFR SERPL CREATININE-BSD FRML MDRD: 96 ML/MIN/1.73SQ M
GLUCOSE P FAST SERPL-MCNC: 79 MG/DL (ref 65–99)
HBA1C MFR BLD: 5.3 %
HCT VFR BLD AUTO: 44.5 % (ref 34.8–46.1)
HDLC SERPL-MCNC: 66 MG/DL
HGB BLD-MCNC: 14.5 G/DL (ref 11.5–15.4)
IMM GRANULOCYTES # BLD AUTO: 0.02 THOUSAND/UL (ref 0–0.2)
IMM GRANULOCYTES NFR BLD AUTO: 0 % (ref 0–2)
IRON SATN MFR SERPL: 26 % (ref 15–50)
IRON SERPL-MCNC: 93 UG/DL (ref 50–212)
LDLC SERPL CALC-MCNC: 80 MG/DL (ref 0–100)
LYMPHOCYTES # BLD AUTO: 2.58 THOUSANDS/ÂΜL (ref 0.6–4.47)
LYMPHOCYTES NFR BLD AUTO: 32 % (ref 14–44)
MCH RBC QN AUTO: 30.9 PG (ref 26.8–34.3)
MCHC RBC AUTO-ENTMCNC: 32.6 G/DL (ref 31.4–37.4)
MCV RBC AUTO: 95 FL (ref 82–98)
MONOCYTES # BLD AUTO: 0.55 THOUSAND/ÂΜL (ref 0.17–1.22)
MONOCYTES NFR BLD AUTO: 7 % (ref 4–12)
NEUTROPHILS # BLD AUTO: 4.95 THOUSANDS/ÂΜL (ref 1.85–7.62)
NEUTS SEG NFR BLD AUTO: 60 % (ref 43–75)
NONHDLC SERPL-MCNC: 98 MG/DL
NRBC BLD AUTO-RTO: 0 /100 WBCS
PLATELET # BLD AUTO: 284 THOUSANDS/UL (ref 149–390)
PMV BLD AUTO: 11.5 FL (ref 8.9–12.7)
POTASSIUM SERPL-SCNC: 4.2 MMOL/L (ref 3.5–5.3)
PROT SERPL-MCNC: 6.9 G/DL (ref 6.4–8.4)
PTH-INTACT SERPL-MCNC: 50.5 PG/ML (ref 12–88)
RBC # BLD AUTO: 4.69 MILLION/UL (ref 3.81–5.12)
SODIUM SERPL-SCNC: 140 MMOL/L (ref 135–147)
TIBC SERPL-MCNC: 355.6 UG/DL (ref 250–450)
TRANSFERRIN SERPL-MCNC: 254 MG/DL (ref 203–362)
TRIGL SERPL-MCNC: 92 MG/DL (ref ?–150)
TSH SERPL DL<=0.05 MIU/L-ACNC: 1.71 UIU/ML (ref 0.45–4.5)
UIBC SERPL-MCNC: 263 UG/DL (ref 155–355)
VIT B12 SERPL-MCNC: 391 PG/ML (ref 180–914)
WBC # BLD AUTO: 8.2 THOUSAND/UL (ref 4.31–10.16)

## 2025-03-01 PROCEDURE — 84630 ASSAY OF ZINC: CPT

## 2025-03-01 PROCEDURE — 82746 ASSAY OF FOLIC ACID SERUM: CPT

## 2025-03-01 PROCEDURE — 82607 VITAMIN B-12: CPT

## 2025-03-01 PROCEDURE — 80053 COMPREHEN METABOLIC PANEL: CPT

## 2025-03-01 PROCEDURE — 83540 ASSAY OF IRON: CPT

## 2025-03-01 PROCEDURE — 36415 COLL VENOUS BLD VENIPUNCTURE: CPT

## 2025-03-01 PROCEDURE — 85025 COMPLETE CBC W/AUTO DIFF WBC: CPT

## 2025-03-01 PROCEDURE — 84443 ASSAY THYROID STIM HORMONE: CPT

## 2025-03-01 PROCEDURE — 82728 ASSAY OF FERRITIN: CPT

## 2025-03-01 PROCEDURE — 80061 LIPID PANEL: CPT

## 2025-03-01 PROCEDURE — 83550 IRON BINDING TEST: CPT

## 2025-03-01 PROCEDURE — 82306 VITAMIN D 25 HYDROXY: CPT

## 2025-03-01 PROCEDURE — 84590 ASSAY OF VITAMIN A: CPT

## 2025-03-01 PROCEDURE — 84425 ASSAY OF VITAMIN B-1: CPT

## 2025-03-01 PROCEDURE — 83036 HEMOGLOBIN GLYCOSYLATED A1C: CPT

## 2025-03-01 PROCEDURE — 83970 ASSAY OF PARATHORMONE: CPT

## 2025-03-03 ENCOUNTER — RESULTS FOLLOW-UP (OUTPATIENT)
Dept: BARIATRICS | Facility: CLINIC | Age: 50
End: 2025-03-03

## 2025-03-03 DIAGNOSIS — K91.2 POSTSURGICAL MALABSORPTION: Primary | ICD-10-CM

## 2025-03-03 DIAGNOSIS — R79.89 LOW VITAMIN B12 LEVEL: ICD-10-CM

## 2025-03-03 NOTE — RESULT ENCOUNTER NOTE
Please let Michelle know about her labs, thank you:    - Your Vitamin B12 levels are mildly low - please take an additional 1,000mcg sublingual B12 daily. This can be found inexpensively over the counter.  Repeat labs in 4 months    -Your vitamin D is low normal:  Please add an additional 2,000 IU of Vitamin D3 per day in addition to the 3,000IU of Vitamin D you are currently taking in your Bariatric MVI.  Vitamin D is best absorbed with food, so take it with your largest meal of the day. It can be found inexpensively over the counter at your pharmacy or online.    Remember to also take 1500 mg calcium citrate per day total (taken 500 mg at a time, three times per day). It is very important that you separate each dose by at least 2 hours and take calcium at least 2 hours apart from MVI and iron.

## 2025-03-03 NOTE — RESULT ENCOUNTER NOTE
VANESSA and [facility]

## 2025-03-05 LAB — ZINC SERPL-MCNC: 73 UG/DL (ref 44–115)

## 2025-03-06 LAB — VIT B1 BLD-SCNC: 156.5 NMOL/L (ref 66.5–200)

## 2025-03-13 LAB — VIT A SERPL-MCNC: 35.9 UG/DL (ref 20.1–62)

## 2025-03-19 ENCOUNTER — OFFICE VISIT (OUTPATIENT)
Dept: FAMILY MEDICINE CLINIC | Facility: CLINIC | Age: 50
End: 2025-03-19
Payer: COMMERCIAL

## 2025-03-19 VITALS
HEIGHT: 62 IN | RESPIRATION RATE: 16 BRPM | SYSTOLIC BLOOD PRESSURE: 126 MMHG | BODY MASS INDEX: 34.48 KG/M2 | DIASTOLIC BLOOD PRESSURE: 80 MMHG | TEMPERATURE: 97.8 F | WEIGHT: 187.4 LBS | HEART RATE: 64 BPM

## 2025-03-19 DIAGNOSIS — R58 ECCHYMOSIS: Primary | ICD-10-CM

## 2025-03-19 PROCEDURE — 99213 OFFICE O/P EST LOW 20 MIN: CPT | Performed by: NURSE PRACTITIONER

## 2025-03-19 NOTE — PROGRESS NOTES
"Name: Michelle Mcfadden      : 1975      MRN: [id number]  Encounter Provider: YESY Madrigal  Encounter Date: 3/19/2025   Encounter department: [facility]  :  Assessment & Plan  Ecchymosis  Will have her monitor this over the next couple of week.  Suspect underlying vascular lesion causing the bruising- if lesion still visible/present once bruising resolves, will have her f/u with dermatology.  If this is a blood blister, discussed this will resolve entirely.  She will f/u as needed              History of Present Illness   Here today for evaluation of bruising on her left breast.  She noticed this in the shower last night.  Area appears to be circular with a spot in the center.  She does not recall any type of trauma/injury or bug bites.  Area feels tender, no masses or other breast changes.      Review of Systems   Constitutional: Negative.    Skin:         See HPI       Objective   /80   Pulse 64   Temp 97.8 °F (36.6 °C)   Resp 16   Ht 5' 1.75\" (1.568 m)   Wt 85 kg (187 lb 6.4 oz)   BMI 34.55 kg/m²      Physical Exam  Chest:             "

## 2025-03-24 DIAGNOSIS — E66.812 OBESITY, CLASS II, BMI 35-39.9: Primary | ICD-10-CM

## 2025-03-24 DIAGNOSIS — R63.2 EXCESSIVE HUNGER: ICD-10-CM

## 2025-03-24 RX ORDER — TIRZEPATIDE 7.5 MG/.5ML
7.5 INJECTION, SOLUTION SUBCUTANEOUS WEEKLY
Qty: 2 ML | Refills: 0 | Status: SHIPPED | OUTPATIENT
Start: 2025-03-24 | End: 2025-04-21

## 2025-04-01 NOTE — ASSESSMENT & PLAN NOTE
-At risk for malabsorption of vitamins/minerals secondary to malabsorption and restriction of intake from bariatric surgery  -Currently taking adequate postop bariatric surgery vitamin supplementation: Bariatric MVI w/ 45mg, calcium citrate 500mg TID    -Labs from 03/01/25:   -B12 mildly low - add in 1,000mcg sublingual B12  -Vitamin D low normal - add in 2,000IU D3 daily with food    -Repeat entire CBC/Metabolic panel in 1 year; partial labs in 4-6 months given she is on GLP-1  -Patient received education about the importance of adhering to a lifelong supplementation regimen to avoid vitamin/mineral deficiencies

## 2025-04-01 NOTE — ASSESSMENT & PLAN NOTE
-s/p Sachin-En-Y Gastric Bypass with Dr. Hdez on 11/29/21. Overall doing very well with 17lbs weight loss in the last 3 months. She will continue Zepbound and and improving diet (needs to increase protein especially in the morning) and lifestyle and f/u with monthly weight checks and me in 3 months.  Recommend f/u with RD for additional support.    Patient denies personal history of pancreatitis. Patient also denies personal and family history of medullary thyroid cancer and multiple endocrine neoplasia type 2 (MEN 2 tumor).     Initial: 241.2lbs  Current: 184.4lbs  EWL: 54%  [place]: 168lbs 6 months post op  Current BMI is Body mass index is 33.73 kg/m².    Tolerating a regular diet-yes  Eating at least 60 grams of protein per day-yes  Following 30/60 minute rule with liquids-yes  Drinking at least 64 ounces of fluid per day-no; advised her to increase to goal  Drinking carbonated beverages-no  Sufficient exercise-yes  Using NSAIDs regularly-no  Using nicotine-no  Using alcohol-no. Advised about the risks of alcohol s/p bariatric surgery and recommend avoiding all alcohol  Zepbound Instructions:    - Begin Zepbound 2.5 mg subcutaneously once a week. Dose changes may occur after 4 doses if medication is tolerated. You will be assessed prior to each dose change to make sure you are tolerating the medication well.  - Please message me when you have 2 pens left from the prescription so there are no lapses in treatment.  - If you have been off the medication for more than 14 days please contact the office as you will need to restart the titration at the starting dose again to avoid significant side effects or adverse events.  - Visit [email or web address] for further information/injection instructions.   -Please eat small frequent meals to help reduce nausea. Lemon water and saltine crackers may help with this.   - Side effects of Zepbound discussed: nausea, vomiting, diarrhea, and constipation. If you experience fever,  nausea/vomiting, and pain radiating to your back this may be a sign of pancreatitis. Please go to the emergency room if this occurs.  - If on oral birth control a 2nd method of birth control is recommended during the 1st 8 weeks of therapy and for 4 weeks after any dosage change.   - Patient understands the side effects of the medication and proper administration. Patient agrees with the treatment plan and all questions were answered.

## 2025-04-02 ENCOUNTER — OFFICE VISIT (OUTPATIENT)
Dept: BARIATRICS | Facility: CLINIC | Age: 50
End: 2025-04-02
Payer: COMMERCIAL

## 2025-04-02 VITALS
DIASTOLIC BLOOD PRESSURE: 68 MMHG | HEIGHT: 62 IN | BODY MASS INDEX: 33.93 KG/M2 | WEIGHT: 184.4 LBS | HEART RATE: 65 BPM | SYSTOLIC BLOOD PRESSURE: 120 MMHG

## 2025-04-02 DIAGNOSIS — E78.2 MIXED HYPERLIPIDEMIA: ICD-10-CM

## 2025-04-02 DIAGNOSIS — Z48.815 ENCOUNTER FOR SURGICAL AFTERCARE FOLLOWING SURGERY OF DIGESTIVE SYSTEM: Primary | ICD-10-CM

## 2025-04-02 DIAGNOSIS — K91.2 POSTSURGICAL MALABSORPTION: ICD-10-CM

## 2025-04-02 PROCEDURE — 99214 OFFICE O/P EST MOD 30 MIN: CPT | Performed by: PHYSICIAN ASSISTANT

## 2025-04-02 RX ORDER — CHOLECALCIFEROL (VITAMIN D3) 25 MCG
1000 TABLET ORAL DAILY
COMMUNITY

## 2025-04-02 NOTE — PROGRESS NOTES
Assessment/Plan:    Encounter for surgical aftercare following surgery of digestive system  -s/p Sachin-En-Y Gastric Bypass with Dr. Hdez on 11/29/21. Overall doing very well with 17lbs weight loss in the last 3 months. She will continue Zepbound and and improving diet (needs to increase protein especially in the morning) and lifestyle and f/u with monthly weight checks and me in 3 months.  Recommend f/u with RD for additional support.    Patient denies personal history of pancreatitis. Patient also denies personal and family history of medullary thyroid cancer and multiple endocrine neoplasia type 2 (MEN 2 tumor).     Initial: 241.2lbs  Current: 184.4lbs  EWL: 54%  Gama: 168lbs 6 months post op  Current BMI is Body mass index is 33.73 kg/m².    Tolerating a regular diet-yes  Eating at least 60 grams of protein per day-yes  Following 30/60 minute rule with liquids-yes  Drinking at least 64 ounces of fluid per day-no; advised her to increase to goal  Drinking carbonated beverages-no  Sufficient exercise-yes  Using NSAIDs regularly-no  Using nicotine-no  Using alcohol-no. Advised about the risks of alcohol s/p bariatric surgery and recommend avoiding all alcohol  Zepbound Instructions:    - Begin Zepbound 2.5 mg subcutaneously once a week. Dose changes may occur after 4 doses if medication is tolerated. You will be assessed prior to each dose change to make sure you are tolerating the medication well.  - Please message me when you have 2 pens left from the prescription so there are no lapses in treatment.  - If you have been off the medication for more than 14 days please contact the office as you will need to restart the titration at the starting dose again to avoid significant side effects or adverse events.  - Visit [email or web address] for further information/injection instructions.   -Please eat small frequent meals to help reduce nausea. Lemon water and saltine crackers may help with this.   - Side effects of Zepbound  discussed: nausea, vomiting, diarrhea, and constipation. If you experience fever, nausea/vomiting, and pain radiating to your back this may be a sign of pancreatitis. Please go to the emergency room if this occurs.  - If on oral birth control a 2nd method of birth control is recommended during the 1st 8 weeks of therapy and for 4 weeks after any dosage change.   - Patient understands the side effects of the medication and proper administration. Patient agrees with the treatment plan and all questions were answered.       Postsurgical malabsorption  -At risk for malabsorption of vitamins/minerals secondary to malabsorption and restriction of intake from bariatric surgery  -Currently taking adequate postop bariatric surgery vitamin supplementation: Bariatric MVI w/ 45mg, calcium citrate 500mg TID    -Labs from 03/01/25:   -B12 mildly low - add in 1,000mcg sublingual B12  -Vitamin D low normal - add in 2,000IU D3 daily with food    -Repeat entire CBC/Metabolic panel in 1 year; partial labs in 4-6 months given she is on GLP-1  -Patient received education about the importance of adhering to a lifelong supplementation regimen to avoid vitamin/mineral deficiencies     Mixed hyperlipidemia  -Avoid fried foods and trans fat, limit saturated fats and refined carbohydrates  -Increase fish/omega 3 FA consumption  -Increase physical activity  -Lipids WNL in June     Diagnoses and all orders for this visit:    Encounter for surgical aftercare following surgery of digestive system    Postsurgical malabsorption    Mixed hyperlipidemia    Other orders  -     cholecalciferol (VITAMIN D3) 1,000 units tablet; Take 1,000 Units by mouth daily          Subjective:      Patient ID: Michelle Mcfadden is a 49 y.o. female.    -s/p Sachin-En-Y Gastric Bypass with Dr. Hdez on 11/29/21. Presents to the office today for routine follow up. Tolerating diet without issues; denies N/V, dysphagia, reflux.     Has been on Zepbound since December - down 17lbs  "(8% TWL) and feeling great with her weight loss! Tolerable nausea first day after injection. She just started the 7.5mg dose. One episode of heartburn last night. Daily BM's.      Upcoming trip to [place] and [place]. Twins doing better - ADHD and Autism diagnosis and depression/anxiety. Works remotely for IT - patient billing. Has twins (Lizbet Verma) in [place] - in [facility] [employer] -went to [place] last January. Mother Charo in [place] doing well. Has 7 siblings.     Diet Recall:   2 cups coffee  Snack - saltines  L - english muffin and some protein or leftovers  D - turkey or chicken or fish and veggies    Fluids - 48oz water, 2 cups coffee    The following portions of the patient's history were reviewed and updated as appropriate: allergies, current medications, past family history, past medical history, past social history, past surgical history and problem list.    Review of Systems   Constitutional:  Negative for chills and fever. Unexpected weight change: planned weight loss.  HENT:  Negative for trouble swallowing.    Respiratory:  Negative for cough and shortness of breath.    Cardiovascular:  Negative for chest pain and palpitations.   Gastrointestinal:  Positive for nausea. Negative for abdominal pain, constipation, diarrhea and vomiting.   Neurological:  Negative for dizziness.   Psychiatric/Behavioral:          Denies anxiety and depression         Objective:      /68 (Patient Position: Sitting, Cuff Size: Large)   Pulse 65   Ht 5' 2\" (1.575 m)   Wt 83.6 kg (184 lb 6.4 oz)   BMI 33.73 kg/m²     Colonoscopy-Completed       Physical Exam  Vitals reviewed.   Constitutional:       General: She is not in acute distress.     Appearance: She is well-developed.   HENT:      Head: Normocephalic and atraumatic.   Eyes:      General: No scleral icterus.  Cardiovascular:      Rate and Rhythm: Normal rate and regular rhythm.   Pulmonary:      Effort: Pulmonary effort is normal. No respiratory distress.   Abdominal: "      General: There is no distension.      Palpations: Abdomen is soft.   Skin:     General: Skin is warm and dry.   Neurological:      Mental Status: She is alert.   Psychiatric:         Mood and Affect: Mood normal.         Behavior: Behavior normal.           BARRIERS: none identified    GOALS:   Continued/Maintain healthy weight loss with good nutrition intakes.  Adequate hydration with at least 64oz. fluid intake.  Normal vitamin and mineral levels.  Exercise as tolerated.    Follow-up in 3 months/. We kindly ask that your arrive 15 minutes before your scheduled appointment time with your provider to allow our staff to room you, get your vital signs and update your chart.    Follow diet as discussed.      Get lab work done in the next 2 weeks.  You have been given a lab slip today.  Please call the office if you need a replacement.  It is recommended to check with your insurance BEFORE getting labs done to make sure they are covered by your policy.  Also, please check with your PCP and other providers before getting labs to avoid duplicate labs. Make sure to HOLD any multivitamins that may contain biotin and any biotin supplements FOR 5 DAYS before any labs since it can affect the results.    Follow vitamin and mineral recommendations as reviewed with you.    Call our office if you have any problems with abdominal pain especially associated with fever, chills, nausea, vomiting or any other concerns.    All  Post-bariatric surgery patients should be aware that very small quantities of any alcohol can cause impairment and it is very possible not to feel the effect. The effect can be in the system for several hours.  It is also a stomach irritant.     It is advised to AVOID alcohol, Nonsteroidal antiinflammatory drugs (NSAIDS) and nicotine of all forms . Any of these can cause stomach irritation/pain.

## 2025-04-16 DIAGNOSIS — E66.812 OBESITY, CLASS II, BMI 35-39.9: ICD-10-CM

## 2025-04-16 DIAGNOSIS — R63.2 EXCESSIVE HUNGER: ICD-10-CM

## 2025-04-16 RX ORDER — TIRZEPATIDE 7.5 MG/.5ML
7.5 INJECTION, SOLUTION SUBCUTANEOUS WEEKLY
Qty: 2 ML | Refills: 0 | Status: SHIPPED | OUTPATIENT
Start: 2025-04-16 | End: 2025-05-14

## 2025-04-30 ENCOUNTER — OFFICE VISIT (OUTPATIENT)
Dept: FAMILY MEDICINE CLINIC | Facility: CLINIC | Age: 50
End: 2025-04-30
Payer: COMMERCIAL

## 2025-04-30 VITALS
TEMPERATURE: 97.2 F | SYSTOLIC BLOOD PRESSURE: 130 MMHG | WEIGHT: 181 LBS | RESPIRATION RATE: 18 BRPM | HEIGHT: 62 IN | BODY MASS INDEX: 33.31 KG/M2 | HEART RATE: 66 BPM | DIASTOLIC BLOOD PRESSURE: 70 MMHG | OXYGEN SATURATION: 99 %

## 2025-04-30 DIAGNOSIS — N39.0 ACUTE UTI: Primary | ICD-10-CM

## 2025-04-30 LAB
SL AMB  POCT GLUCOSE, UA: NORMAL
SL AMB LEUKOCYTE ESTERASE,UA: NORMAL
SL AMB POCT BILIRUBIN,UA: NORMAL
SL AMB POCT BLOOD,UA: NORMAL
SL AMB POCT CLARITY,UA: CLEAR
SL AMB POCT COLOR,UA: YELLOW
SL AMB POCT KETONES,UA: NORMAL
SL AMB POCT NITRITE,UA: NORMAL
SL AMB POCT PH,UA: 6.5
SL AMB POCT SPECIFIC GRAVITY,UA: 1.02
SL AMB POCT URINE PROTEIN: NORMAL
SL AMB POCT UROBILINOGEN: 1

## 2025-04-30 PROCEDURE — 87086 URINE CULTURE/COLONY COUNT: CPT | Performed by: FAMILY MEDICINE

## 2025-04-30 PROCEDURE — 99213 OFFICE O/P EST LOW 20 MIN: CPT | Performed by: FAMILY MEDICINE

## 2025-04-30 PROCEDURE — 81003 URINALYSIS AUTO W/O SCOPE: CPT | Performed by: FAMILY MEDICINE

## 2025-04-30 RX ORDER — CEFUROXIME AXETIL 500 MG/1
500 TABLET ORAL EVERY 12 HOURS SCHEDULED
Qty: 14 TABLET | Refills: 0 | Status: SHIPPED | OUTPATIENT
Start: 2025-04-30 | End: 2025-05-07

## 2025-04-30 NOTE — PROGRESS NOTES
"Name: Michelle Mcfadden      : 1975      MRN: [id number]  Encounter Provider: Zakia Batres DO  Encounter Date: 2025   Encounter department: [facility]  :  Assessment & Plan  Acute UTI    Orders:  •  POCT urine dip auto non-scope  •  Urine culture  •  cefuroxime (CEFTIN) 500 mg tablet; Take 1 tablet (500 mg total) by mouth every 12 (twelve) hours for 7 days      Assessment & Plan      Return if symptoms worsen or fail to improve.     History of Present Illness   She noticed her symptoms about 2 days ago.      Difficulty Urinating   This is a new problem. The current episode started in the past 7 days. The problem occurs every urination. The problem has been unchanged. The quality of the pain is described as aching and burning. The pain is at a severity of 2/10. The pain is mild. There has been no fever. She is Not sexually active. There is No history of pyelonephritis. Associated symptoms include frequency, hesitancy, nausea and urgency. Pertinent negatives include no chills, discharge, flank pain, hematuria, possible pregnancy, sweats or vomiting.     Review of Systems   Constitutional:  Negative for chills.   Gastrointestinal:  Positive for nausea. Negative for vomiting.   Genitourinary:  Positive for dysuria, frequency, hesitancy and urgency. Negative for flank pain and hematuria.       Objective   /70   Pulse 66   Temp (!) 97.2 °F (36.2 °C)   Resp 18   Ht 5' 2\" (1.575 m)   Wt 82.1 kg (181 lb)   SpO2 99%   BMI 33.11 kg/m²      Physical Exam  Vitals and nursing note reviewed.   Constitutional:       General: She is not in acute distress.     Appearance: She is well-developed.   HENT:      Head: Normocephalic and atraumatic.      Right Ear: Tympanic membrane normal.      Left Ear: Tympanic membrane normal.   Cardiovascular:      Rate and Rhythm: Normal rate and regular rhythm.      Heart sounds: No murmur heard.  Pulmonary:      Effort: Pulmonary effort is normal. No respiratory " distress.      Breath sounds: Normal breath sounds.   Abdominal:      Tenderness: There is abdominal tenderness (suprapubic).   Musculoskeletal:      Cervical back: Neck supple.   Neurological:      Mental Status: She is alert.

## 2025-05-02 ENCOUNTER — RESULTS FOLLOW-UP (OUTPATIENT)
Dept: FAMILY MEDICINE CLINIC | Facility: CLINIC | Age: 50
End: 2025-05-02

## 2025-05-02 LAB — BACTERIA UR CULT: NORMAL

## 2025-05-10 ENCOUNTER — HOSPITAL ENCOUNTER (OUTPATIENT)
Dept: RADIOLOGY | Facility: HOSPITAL | Age: 50
Discharge: HOME | End: 2025-05-10
Payer: COMMERCIAL

## 2025-05-10 VITALS — HEIGHT: 62 IN | BODY MASS INDEX: 33.31 KG/M2 | WEIGHT: 181 LBS

## 2025-05-10 DIAGNOSIS — Z12.31 ENCOUNTER FOR SCREENING MAMMOGRAM FOR BREAST CANCER: ICD-10-CM

## 2025-05-10 PROCEDURE — 77063 BREAST TOMOSYNTHESIS BI: CPT

## 2025-05-10 PROCEDURE — 77067 SCR MAMMO BI INCL CAD: CPT

## 2025-05-12 ENCOUNTER — RESULTS FOLLOW-UP (OUTPATIENT)
Dept: FAMILY MEDICINE CLINIC | Facility: CLINIC | Age: 50
End: 2025-05-12

## 2025-05-13 DIAGNOSIS — R63.2 EXCESSIVE HUNGER: ICD-10-CM

## 2025-05-13 DIAGNOSIS — E66.812 OBESITY, CLASS II, BMI 35-39.9: ICD-10-CM

## 2025-05-14 NOTE — TELEPHONE ENCOUNTER
Spoke with pt stated she is taking zepbound 7.5, tolerating without  any side effects.last 20 lbs since starting zepbound

## 2025-05-15 DIAGNOSIS — E66.812 OBESITY, CLASS II, BMI 35-39.9: Primary | ICD-10-CM

## 2025-05-15 RX ORDER — TIRZEPATIDE 7.5 MG/.5ML
7.5 INJECTION, SOLUTION SUBCUTANEOUS WEEKLY
Qty: 2 ML | Refills: 0 | Status: SHIPPED | OUTPATIENT
Start: 2025-05-15 | End: 2025-06-12

## 2025-06-10 DIAGNOSIS — R63.2 EXCESSIVE HUNGER: ICD-10-CM

## 2025-06-10 DIAGNOSIS — E66.812 OBESITY, CLASS II, BMI 35-39.9: ICD-10-CM

## 2025-06-10 RX ORDER — TIRZEPATIDE 7.5 MG/.5ML
7.5 INJECTION, SOLUTION SUBCUTANEOUS WEEKLY
Qty: 2 ML | Refills: 0 | Status: SHIPPED | OUTPATIENT
Start: 2025-06-10 | End: 2025-07-08

## 2025-07-02 ENCOUNTER — CLINICAL SUPPORT (OUTPATIENT)
Dept: BARIATRICS | Facility: CLINIC | Age: 50
End: 2025-07-02

## 2025-07-02 ENCOUNTER — OFFICE VISIT (OUTPATIENT)
Dept: BARIATRICS | Facility: CLINIC | Age: 50
End: 2025-07-02

## 2025-07-02 VITALS
WEIGHT: 172.6 LBS | HEART RATE: 62 BPM | BODY MASS INDEX: 31.76 KG/M2 | HEIGHT: 62 IN | SYSTOLIC BLOOD PRESSURE: 110 MMHG | DIASTOLIC BLOOD PRESSURE: 78 MMHG

## 2025-07-02 VITALS — BODY MASS INDEX: 31.76 KG/M2 | WEIGHT: 172.6 LBS | HEIGHT: 62 IN

## 2025-07-02 DIAGNOSIS — K91.2 POSTSURGICAL MALABSORPTION: Primary | ICD-10-CM

## 2025-07-02 DIAGNOSIS — R79.89 LOW VITAMIN B12 LEVEL: ICD-10-CM

## 2025-07-02 DIAGNOSIS — Z48.815 ENCOUNTER FOR SURGICAL AFTERCARE FOLLOWING SURGERY OF DIGESTIVE SYSTEM: Primary | ICD-10-CM

## 2025-07-02 DIAGNOSIS — E55.9 VITAMIN D INSUFFICIENCY: ICD-10-CM

## 2025-07-02 DIAGNOSIS — E66.812 OBESITY, CLASS II, BMI 35-39.9: ICD-10-CM

## 2025-07-02 DIAGNOSIS — E78.2 MIXED HYPERLIPIDEMIA: ICD-10-CM

## 2025-07-02 DIAGNOSIS — K91.2 POSTSURGICAL MALABSORPTION: ICD-10-CM

## 2025-07-02 PROCEDURE — 99214 OFFICE O/P EST MOD 30 MIN: CPT | Performed by: PHYSICIAN ASSISTANT

## 2025-07-02 PROCEDURE — RECHECK

## 2025-07-02 RX ORDER — TIRZEPATIDE 7.5 MG/.5ML
7.5 INJECTION, SOLUTION SUBCUTANEOUS WEEKLY
Qty: 2 ML | Refills: 2 | Status: SHIPPED | OUTPATIENT
Start: 2025-07-02

## 2025-07-02 NOTE — ASSESSMENT & PLAN NOTE
-s/p Sachin-En-Y Gastric Bypass with Dr. Hdez on 11/29/21. Overall doing very well with 28lbs (14%) weight loss in the last 6 months. She will continue Zepbound 7.5mg dose and and improving diet (needs to increase protein especially in the morning) and lifestyle and f/u with monthly weight checks and me in 3 months.  Recommend f/u with RD for additional support - meeting with her today.      Patient denies personal history of pancreatitis. Patient also denies personal and family history of medullary thyroid cancer and multiple endocrine neoplasia type 2 (MEN 2 tumor).     Initial: 241.2lbs  Current: 172.6lbs  EWL: 65%  Gama: 168lbs 6 months post op  Current BMI is Body mass index is 31.57 kg/m².    Tolerating a regular diet-yes  Eating at least 60 grams of protein per day-yes  Following 30/60 minute rule with liquids-yes  Drinking at least 64 ounces of fluid per day-no; advised her to increase to goal  Drinking carbonated beverages-no  Sufficient exercise-yes  Using NSAIDs regularly-no  Using nicotine-no  Using alcohol-no. Advised about the risks of alcohol s/p bariatric surgery and recommend avoiding all alcohol  Zepbound Instructions:    - Begin Zepbound 2.5 mg subcutaneously once a week. Dose changes may occur after 4 doses if medication is tolerated. You will be assessed prior to each dose change to make sure you are tolerating the medication well.  - Please message me when you have 2 pens left from the prescription so there are no lapses in treatment.  - If you have been off the medication for more than 14 days please contact the office as you will need to restart the titration at the starting dose again to avoid significant side effects or adverse events.  - Visit [email or web address] for further information/injection instructions.   -Please eat small frequent meals to help reduce nausea. Lemon water and saltine crackers may help with this.   - Side effects of Zepbound discussed: nausea, vomiting, diarrhea, and  constipation. If you experience fever, nausea/vomiting, and pain radiating to your back this may be a sign of pancreatitis. Please go to the emergency room if this occurs.  - If on oral birth control a 2nd method of birth control is recommended during the 1st 8 weeks of therapy and for 4 weeks after any dosage change.   - Patient understands the side effects of the medication and proper administration. Patient agrees with the treatment plan and all questions were answered.

## 2025-07-02 NOTE — PROGRESS NOTES
Assessment/Plan:    Encounter for surgical aftercare following surgery of digestive system  -s/p Sachin-En-Y Gastric Bypass with Dr. Hdez on 11/29/21. Overall doing very well with 28lbs (14%) weight loss in the last 6 months. She will continue Zepbound 7.5mg dose and and improving diet (needs to increase protein especially in the morning) and lifestyle and f/u with monthly weight checks and me in 3 months.  Recommend f/u with RD for additional support - meeting with her today.      Patient denies personal history of pancreatitis. Patient also denies personal and family history of medullary thyroid cancer and multiple endocrine neoplasia type 2 (MEN 2 tumor).     Initial: 241.2lbs  Current: 172.6lbs  EWL: 65%  Gama: 168lbs 6 months post op  Current BMI is Body mass index is 31.57 kg/m².    Tolerating a regular diet-yes  Eating at least 60 grams of protein per day-yes  Following 30/60 minute rule with liquids-yes  Drinking at least 64 ounces of fluid per day-no; advised her to increase to goal  Drinking carbonated beverages-no  Sufficient exercise-yes  Using NSAIDs regularly-no  Using nicotine-no  Using alcohol-no. Advised about the risks of alcohol s/p bariatric surgery and recommend avoiding all alcohol  Zepbound Instructions:    - Begin Zepbound 2.5 mg subcutaneously once a week. Dose changes may occur after 4 doses if medication is tolerated. You will be assessed prior to each dose change to make sure you are tolerating the medication well.  - Please message me when you have 2 pens left from the prescription so there are no lapses in treatment.  - If you have been off the medication for more than 14 days please contact the office as you will need to restart the titration at the starting dose again to avoid significant side effects or adverse events.  - Visit [email or web address] for further information/injection instructions.   -Please eat small frequent meals to help reduce nausea. Lemon water and saltine crackers may help  with this.   - Side effects of Zepbound discussed: nausea, vomiting, diarrhea, and constipation. If you experience fever, nausea/vomiting, and pain radiating to your back this may be a sign of pancreatitis. Please go to the emergency room if this occurs.  - If on oral birth control a 2nd method of birth control is recommended during the 1st 8 weeks of therapy and for 4 weeks after any dosage change.   - Patient understands the side effects of the medication and proper administration. Patient agrees with the treatment plan and all questions were answered.       Postsurgical malabsorption  -At risk for malabsorption of vitamins/minerals secondary to malabsorption and restriction of intake from bariatric surgery  -Currently taking adequate postop bariatric surgery vitamin supplementation: Bariatric MVI w/ 45mg, calcium citrate 500mg TID    -Labs from 03/01/25:   -B12 mildly low - add in 1,000mcg sublingual B12  -Vitamin D low normal - add in 2,000IU D3 daily with food    -Repeat entire CBC/Metabolic panel in 1 year; partial labs in about 3 months given she is on GLP-1  -Patient received education about the importance of adhering to a lifelong supplementation regimen to avoid vitamin/mineral deficiencies     Mixed hyperlipidemia  -Avoid fried foods and trans fat, limit saturated fats and refined carbohydrates  -Increase fish/omega 3 FA consumption  -Increase physical activity  -Lipids WNL in March     Diagnoses and all orders for this visit:    Encounter for surgical aftercare following surgery of digestive system    Postsurgical malabsorption  -     CBC and differential; Future  -     Comprehensive metabolic panel; Future  -     Hemoglobin A1C; Future  -     TSH, 3rd generation with Free T4 reflex; Future  -     Vitamin B12; Future  -     Vitamin D 25 hydroxy; Future    Mixed hyperlipidemia    Low vitamin B12 level  -     Vitamin B12; Future    Vitamin D insufficiency  -     Vitamin D 25 hydroxy; Future    Obesity,  "Class II, BMI 35-39.9  -     tirzepatide (Zepbound) 7.5 mg/0.5 mL auto-injector; Inject 0.5 mL (7.5 mg total) under the skin once a week          Subjective:      Patient ID: Michelle Mcfadden is a 49 y.o. female.    -s/p Sachin-En-Y Gastric Bypass with Dr. Hdez on 11/29/21.  Presents to the office today for routine follow up. Tolerating diet without issues; denies N/V, dysphagia, reflux. Overall doing very well.    Has been on Zepbound since December - down 28lbs (14% TWL) and feeling great with her weight loss! Tolerable nausea first day after injection. She is on the 7.5mg dose.  Daily BM's.        Recent trip to [place] and [place]. Twins doing better - ADHD and Autism diagnosis and depression/anxiety. Works remotely for IT - patient billing. Has twins (Luci, Lizbet) in  - in [employer] -went to [place] last January. Mother Charo in [place] doing well. Has 7 siblings.     The following portions of the patient's history were reviewed and updated as appropriate: allergies, current medications, past family history, past medical history, past social history, past surgical history and problem list.    Review of Systems   Constitutional:  Negative for chills and fever. Unexpected weight change: planned weight loss.  HENT:  Negative for trouble swallowing.    Respiratory:  Negative for cough and shortness of breath.    Cardiovascular:  Negative for chest pain and palpitations.   Gastrointestinal:  Negative for abdominal pain, constipation, diarrhea, nausea and vomiting.   Neurological:  Negative for dizziness.   Psychiatric/Behavioral:          Denies anxiety and depression         Objective:      /78 (Patient Position: Sitting, Cuff Size: Large)   Pulse 62   Ht 5' 2\" (1.575 m)   Wt 78.3 kg (172 lb 9.6 oz)   BMI 31.57 kg/m²     Colonoscopy-Completed       Physical Exam  Vitals reviewed.   Constitutional:       General: She is not in acute distress.     Appearance: She is well-developed.   HENT:      Head: " Normocephalic and atraumatic.     Eyes:      General: No scleral icterus.      Cardiovascular:      Rate and Rhythm: Normal rate and regular rhythm.   Pulmonary:      Effort: Pulmonary effort is normal. No respiratory distress.   Abdominal:      General: Bowel sounds are normal. There is no distension.     Skin:     General: Skin is warm and dry.     Neurological:      Mental Status: She is alert.     Psychiatric:         Mood and Affect: Mood normal.         Behavior: Behavior normal.           BARRIERS: none identified    GOALS:   Continued/Maintain healthy weight loss with good nutrition intakes.  Adequate hydration with at least 64oz. fluid intake.  Normal vitamin and mineral levels.  Exercise as tolerated.    Follow-up in 3 months. We kindly ask that your arrive 15 minutes before your scheduled appointment time with your provider to allow our staff to room you, get your vital signs and update your chart.    Follow diet as discussed.      Get lab work done in the next 2 weeks.  You have been given a lab slip today.  Please call the office if you need a replacement.  It is recommended to check with your insurance BEFORE getting labs done to make sure they are covered by your policy.  Also, please check with your PCP and other providers before getting labs to avoid duplicate labs. Make sure to HOLD any multivitamins that may contain biotin and any biotin supplements FOR 5 DAYS before any labs since it can affect the results.    Follow vitamin and mineral recommendations as reviewed with you.    Call our office if you have any problems with abdominal pain especially associated with fever, chills, nausea, vomiting or any other concerns.    All  Post-bariatric surgery patients should be aware that very small quantities of any alcohol can cause impairment and it is very possible not to feel the effect. The effect can be in the system for several hours.  It is also a stomach irritant.     It is advised to AVOID  alcohol, Nonsteroidal antiinflammatory drugs (NSAIDS) and nicotine of all forms . Any of these can cause stomach irritation/pain.

## 2025-07-02 NOTE — ASSESSMENT & PLAN NOTE
-Avoid fried foods and trans fat, limit saturated fats and refined carbohydrates  -Increase fish/omega 3 FA consumption  -Increase physical activity  -Lipids WNL in March

## 2025-07-02 NOTE — PROGRESS NOTES
"Bariatric Follow Up Nutrition Note    Type of surgery  Gastric bypass: laparoscopic  Surgery Date: 11/29/21  3.5 yrs  post-op  Surgeon: Dr Hdez    Nutrition Assessment   Michelle Mcfadden  49 y.o.  female    Ht:  61.7\"  Weight (last 2 days)       Date/Time Weight    07/02/25 1425 78.3 (172.6)          Body mass index is 31.57 kg/m².    Initial:  241.2#  Weight on Day of Weight Loss Surgery: 219#  Weight in (lb) to have BMI = 25: 136#  Pre-Op Excess Wt: 105.2#  Gama:  168# at 6 months post-op  Post-Op Wt Loss: 68.6#/ 65% EBWL in 3.5 yrs  Pt currently on Zepbound (some nausea the first day after shot)    Est needs:   1348-4299 cals (to lose 1/2 to 1# per week)  75gm protein    Review of History and Medications   Past Medical History:   Diagnosis Date    Anxiety 2018    Colon cancer screening     COVID-19 vaccine series completed     Depression     Fractures 10/1993    5th metatarsal rt    Hyperlipidemia     recent dx 8/21    Wears glasses     Wears glasses      Past Surgical History:   Procedure Laterality Date    BREAST BIOPSY Right     benign- in her 30's -lipoma removed    CHOLECYSTECTOMY  02/19/2015    [facility]    FERTILITY SURGERY      LASER ABLATION  09/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    NM LAPS GSTR RSTCV PX W/BYP MIGUEL-EN-Y LIMB <150 CM N/A 11/29/2021    Procedure: LAPAROSCOPIC MIGUEL-EN-Y GASTRIC BYPASS AND INTRAOPERATIVE EGD;  Surgeon: Hunter Gomes MD;  Location: [facility];  Service: Bariatrics    WISDOM TOOTH EXTRACTION       Social History     Socioeconomic History    Marital status: /Civil Union   Tobacco Use    Smoking status: Never     Passive exposure: Never    Smokeless tobacco: Never   Vaping Use    Vaping status: Never Used   Substance and Sexual Activity    Alcohol use: Yes     Comment: Very rare, on special occasions    Drug use: No    Sexual activity: Yes     Partners: Male     Birth control/protection: I.U.D.     Comment: Mirena since 2009       Current " Outpatient Medications:     baclofen 10 mg tablet, Take 1 tablet (10 mg total) by mouth daily at bedtime as needed for muscle spasms (Patient not taking: Reported on 7/2/2025), Disp: 30 tablet, Rfl: 1    Calcium-Vitamin A-Vitamin D (LIQUID CALCIUM PO), Take 1,500 mg by mouth in the morning., Disp: , Rfl:     cholecalciferol (VITAMIN D3) 1,000 units tablet, Take 1,000 Units by mouth in the morning., Disp: , Rfl:     escitalopram (LEXAPRO) 20 mg tablet, Take 1 tablet (20 mg total) by mouth daily, Disp: 90 tablet, Rfl: 1    fluticasone (FLONASE) 50 mcg/act nasal spray, 2 sprays into each nostril daily (Patient not taking: Reported on 7/2/2025), Disp: 16 g, Rfl: 3    levonorgestrel (MIRENA) 20 MCG/24HR IUD, 1 each by Intrauterine route once, Disp: , Rfl:     multivitamin (THERAGRAN) TABS, Take 1 tablet by mouth in the morning., Disp: , Rfl:     tirzepatide (Zepbound) 7.5 mg/0.5 mL auto-injector, Inject 0.5 mL (7.5 mg total) under the skin once a week for 28 days, Disp: 2 mL, Rfl: 0    Food Intake and Lifestyle Assessment   Food Intake Assessment completed via usual diet recall  Day after zepbound shot doesn't eat much, relies on protein shakes, but gets better as the week goes on. Takes shot on a Tuesday.    Breakfast: english muffin with pork roll/turkey, but often doesn't eat it all OR a protein shake--suggested a greek yogurt w/20gm protein + granola   Snack:not often  Lunch: 12pm-leftovers from dinner OR just a bag of steamable veggies, suggested at least 1.5oz protein (10gm) with meal  Snack: not often, maybe towards the end of the week before next injection or protein shake if didn't have for breakfast. Suggested protein shake at this snack  Dinner: 5-6pm-chicken/turkey 1-2x per week, salmon 1x/week or shrimp or carb cakes, steamed veggies, if makes pasta for kids has a small bite--suggested at least 1.5oz protein at this meal  Snack: -    Beverage intake: water  Diet texture/stage: regular  Protein supplement:  Premier or [employer]--tires to drink 1 per day  Estimated protein intake per day: 60-90gm  Estimated fluid intake per day: 50-60oz water from circul bottle for sure, working on getting 64oz  Meals eaten away from home: 1x per week. May have 1/2 slice of pizza, always brining home a box of leftovers.   Typical meal pattern: 3 meals per day and grazes at times on snacks  Eating Behaviors: Appropriate diet advancement, Frequent snacking/ grazing, Mindless eating, Emotional eating, Craves sweet foods and Craves salty foods    Food allergies or intolerances: ice cream  Cultural or Restoration considerations: -    Vitamins:  Bariatric advantage EA 2 per day  BA chewy calcium 1 TID    Physical Assessment  Nutrition Related Findings  Dumping with ice cream    Physical Activity  Types of exercise: some walks outside: 1/2 to 1 mile per day.   Current physical limitations: -    Psychosocial Assessment   Support systems: spouse and children friend(s) relative(s)  Socioeconomic factors: -    Nutrition Diagnosis  Diagnosis: Inappropriate intake of carbohydrates (NI-5.8.3) and Inadequate protein intake (NI-5.7.3)  Related to: Excessive energy intake  As Evidenced by: Unintentional weight gain     Interventions and Teaching   Patient educated on post-op nutrition guidelines.       Patient educated and handouts provided.  Adequate hydration  Expected weight loss  Weight loss plateaus/ possibility of weight regain  Exercise  Nutrition considerations after surgery  Protein supplements  Meal planning and preparation  Appropriate carbohydrate, protein, and fat intake, and food/fluid choices to maximize safe weight loss, nutrient intake, and tolerance   Dietary and lifestyle changes  Possible problems with poor eating habits  Intuitive eating  Techniques for self monitoring and keeping daily food journal  Vitamin / Mineral supplementation of Multivitamin with minerals and Calcium    Education provided to: patient    Barriers to  learning: No barriers identified    Readiness to change: action and relapsing    Comprehension: verbalizes understanding     Expected Compliance: good    Pt presents today 3.5 yrs s/p RYGB with some weight regain, started on zepbound and has been doing well. She does get nausea the day of and after shot, but then it decreases over the week.  She notices her portions are small and she often doesn't snack. Suggested 5270-0997 cals and 75gm protein per day to best meet needs.     Goals  Food journal via [employer]--Aim for 3 times per week  4113-0107 cals, 75gm protein  Exercise 30 minutes 5 times per week--increase walking outside and add strength training  Eat 3 meals per day with protein at each meal: suggested 20gm protein greek yogurt with protein granola for breakfast, at least 1.5oz protein (or more) at lunch and dinner with her sides + 1 protein shake to best meet protein needs.   Try adding in some healthy fats to best meet calorie needs  F/U with RD and PA-c in 3 months     Time Spent:   30 Minutes

## 2025-08-09 ENCOUNTER — APPOINTMENT (OUTPATIENT)
Dept: LAB | Facility: HOSPITAL | Age: 50
End: 2025-08-09
Attending: PREVENTIVE MEDICINE
Payer: COMMERCIAL

## 2025-08-09 ENCOUNTER — APPOINTMENT (OUTPATIENT)
Dept: LAB | Facility: HOSPITAL | Age: 50
End: 2025-08-09
Attending: PHYSICIAN ASSISTANT
Payer: COMMERCIAL

## 2025-08-12 ENCOUNTER — ANNUAL EXAM (OUTPATIENT)
Dept: OBGYN CLINIC | Facility: CLINIC | Age: 50
End: 2025-08-12
Payer: COMMERCIAL

## 2025-08-15 ENCOUNTER — OFFICE VISIT (OUTPATIENT)
Dept: FAMILY MEDICINE CLINIC | Facility: CLINIC | Age: 50
End: 2025-08-15
Payer: COMMERCIAL

## (undated) DEVICE — SYRINGE 20ML LL

## (undated) DEVICE — GLOVE INDICATOR PI UNDERGLOVE SZ 7 BLUE

## (undated) DEVICE — ENDO STITCH 2-0 SURGIDAC 48 IN

## (undated) DEVICE — WEBRIL 6 IN UNSTERILE

## (undated) DEVICE — DRAPE EQUIPMENT RF WAND

## (undated) DEVICE — GLOVE SRG BIOGEL 7

## (undated) DEVICE — POWER SHELL SIGNIA

## (undated) DEVICE — SHEARS MONOPOL MINI 5MM X 31CM RATCHET HNDL

## (undated) DEVICE — SUT PDS II 3-0 SH 27 IN Z316H

## (undated) DEVICE — SURGICAL GOWN, XL SMARTSLEEVE: Brand: CONVERTORS

## (undated) DEVICE — 10 MM BABCOCKS WITH RATCHET HANDLES: Brand: ENDOPATH

## (undated) DEVICE — PACK GENERAL LF

## (undated) DEVICE — TISSUE RETRIEVAL SYSTEM: Brand: INZII RETRIEVAL SYSTEM

## (undated) DEVICE — LIGACLIP 10-M/L, 10MM ENDOSCOPIC ROTATING MULTIPLE CLIP APPLIERS: Brand: LIGACLIP

## (undated) DEVICE — COVIDIEN ENDO GIA PURPLE (MED) RELOAD 60MM

## (undated) DEVICE — ENDO STITCH DEVICE 10 MM

## (undated) DEVICE — HARMONIC 1100 SHEARS, 36CM SHAFT LENGTH: Brand: HARMONIC

## (undated) DEVICE — TIBURON LAPAROSCOPIC ABDOMINAL DRAPE: Brand: CONVERTORS

## (undated) DEVICE — PMI DISPOSABLE PUNCTURE CLOSURE DEVICE / SUTURE GRASPER: Brand: PMI

## (undated) DEVICE — TROCAR: Brand: KII FIOS FIRST ENTRY

## (undated) DEVICE — GLOVE SRG BIOGEL ECLIPSE 7.5

## (undated) DEVICE — CHLORAPREP HI-LITE 26ML ORANGE

## (undated) DEVICE — BASIC DOUBLE BASIN 2-LF: Brand: MEDLINE INDUSTRIES, INC.

## (undated) DEVICE — [HIGH FLOW INSUFFLATOR,  DO NOT USE IF PACKAGE IS DAMAGED,  KEEP DRY,  KEEP AWAY FROM SUNLIGHT,  PROTECT FROM HEAT AND RADIOACTIVE SOURCES.]: Brand: PNEUMOSURE

## (undated) DEVICE — STAPLER ENDO GIA ROTICULATOR 60-2.5

## (undated) DEVICE — ENDOPATH XCEL BLADELESS TROCARS WITH STABILITY SLEEVES: Brand: ENDOPATH XCEL

## (undated) DEVICE — NEEDLE SPINAL 20G X 3.5 LF

## (undated) DEVICE — SPONGE LAP 18 X 18 IN STRL RFD

## (undated) DEVICE — ADHESIVE SKIN CLSR DERMABOND NX

## (undated) DEVICE — KERLIX BANDAGE ROLL: Brand: KERLIX

## (undated) DEVICE — IRRIG ENDO FLO TUBING

## (undated) DEVICE — SMOKE REMOVAL SYSTEM: Brand: BOEHRINGER® SMOKE REMOVAL SYSTEM

## (undated) DEVICE — VISUALIZATION SYSTEM: Brand: CLEARIFY

## (undated) DEVICE — SUT MONOCRYL 4-0 PS-2 27 IN Y426H

## (undated) DEVICE — INTENDED FOR TISSUE SEPARATION, AND OTHER PROCEDURES THAT REQUIRE A SHARP SURGICAL BLADE TO PUNCTURE OR CUT.: Brand: BARD-PARKER SAFETY BLADES SIZE 11, STERILE

## (undated) DEVICE — MAYO STAND COVER: Brand: CONVERTORS

## (undated) DEVICE — SUT ETHIBOND 2-0 SH-1/SH-1 30 IN X763H

## (undated) DEVICE — SUT VICRYL 0 18 IN J906G

## (undated) DEVICE — DRAPE UTILITY